# Patient Record
Sex: FEMALE | Race: OTHER | NOT HISPANIC OR LATINO | URBAN - METROPOLITAN AREA
[De-identification: names, ages, dates, MRNs, and addresses within clinical notes are randomized per-mention and may not be internally consistent; named-entity substitution may affect disease eponyms.]

---

## 2017-09-11 ENCOUNTER — OUTPATIENT (OUTPATIENT)
Dept: OUTPATIENT SERVICES | Facility: HOSPITAL | Age: 67
LOS: 1 days | Discharge: HOME | End: 2017-09-11

## 2017-09-11 DIAGNOSIS — E78.5 HYPERLIPIDEMIA, UNSPECIFIED: ICD-10-CM

## 2017-09-11 DIAGNOSIS — I10 ESSENTIAL (PRIMARY) HYPERTENSION: ICD-10-CM

## 2017-09-11 DIAGNOSIS — D64.9 ANEMIA, UNSPECIFIED: ICD-10-CM

## 2018-01-22 ENCOUNTER — OUTPATIENT (OUTPATIENT)
Dept: OUTPATIENT SERVICES | Facility: HOSPITAL | Age: 68
LOS: 1 days | Discharge: HOME | End: 2018-01-22

## 2018-01-22 DIAGNOSIS — E11.9 TYPE 2 DIABETES MELLITUS WITHOUT COMPLICATIONS: ICD-10-CM

## 2018-04-30 ENCOUNTER — OUTPATIENT (OUTPATIENT)
Dept: OUTPATIENT SERVICES | Facility: HOSPITAL | Age: 68
LOS: 1 days | Discharge: HOME | End: 2018-04-30

## 2018-04-30 DIAGNOSIS — E11.9 TYPE 2 DIABETES MELLITUS WITHOUT COMPLICATIONS: ICD-10-CM

## 2018-09-07 ENCOUNTER — INPATIENT (INPATIENT)
Facility: HOSPITAL | Age: 68
LOS: 11 days | Discharge: NURSING, REHAB OR LONG-TERM CARE | End: 2018-09-19
Attending: INTERNAL MEDICINE | Admitting: INTERNAL MEDICINE

## 2018-09-07 VITALS — HEART RATE: 92 BPM | DIASTOLIC BLOOD PRESSURE: 74 MMHG | SYSTOLIC BLOOD PRESSURE: 114 MMHG

## 2018-09-07 LAB
ALBUMIN SERPL ELPH-MCNC: 3.9 G/DL — SIGNIFICANT CHANGE UP (ref 3.5–5.2)
ALBUMIN SERPL ELPH-MCNC: 4.1 G/DL — SIGNIFICANT CHANGE UP (ref 3.5–5.2)
ALP SERPL-CCNC: 22 U/L — LOW (ref 30–115)
ALP SERPL-CCNC: 67 U/L — SIGNIFICANT CHANGE UP (ref 30–115)
ALT FLD-CCNC: 9 U/L — SIGNIFICANT CHANGE UP (ref 0–41)
ALT FLD-CCNC: 9 U/L — SIGNIFICANT CHANGE UP (ref 0–41)
ANION GAP SERPL CALC-SCNC: 17 MMOL/L — HIGH (ref 7–14)
ANION GAP SERPL CALC-SCNC: 24 MMOL/L — HIGH (ref 7–14)
APAP SERPL-MCNC: <5 UG/ML — LOW (ref 10–30)
APAP SERPL-MCNC: <5 UG/ML — LOW (ref 10–30)
APPEARANCE UR: CLEAR — SIGNIFICANT CHANGE UP
APTT BLD: 27.7 SEC — SIGNIFICANT CHANGE UP (ref 27–39.2)
AST SERPL-CCNC: 15 U/L — SIGNIFICANT CHANGE UP (ref 0–41)
AST SERPL-CCNC: 16 U/L — SIGNIFICANT CHANGE UP (ref 0–41)
BASOPHILS # BLD AUTO: 0.02 K/UL — SIGNIFICANT CHANGE UP (ref 0–0.2)
BASOPHILS NFR BLD AUTO: 0.3 % — SIGNIFICANT CHANGE UP (ref 0–1)
BILIRUB SERPL-MCNC: 0.2 MG/DL — SIGNIFICANT CHANGE UP (ref 0.2–1.2)
BILIRUB SERPL-MCNC: 0.3 MG/DL — SIGNIFICANT CHANGE UP (ref 0.2–1.2)
BILIRUB UR-MCNC: NEGATIVE — SIGNIFICANT CHANGE UP
BUN SERPL-MCNC: 52 MG/DL — HIGH (ref 10–20)
BUN SERPL-MCNC: 53 MG/DL — HIGH (ref 10–20)
CALCIUM SERPL-MCNC: 9.4 MG/DL — SIGNIFICANT CHANGE UP (ref 8.5–10.1)
CALCIUM SERPL-MCNC: SIGNIFICANT CHANGE UP MG/DL (ref 8.5–10.1)
CHLORIDE SERPL-SCNC: 101 MMOL/L — SIGNIFICANT CHANGE UP (ref 98–110)
CHLORIDE SERPL-SCNC: 96 MMOL/L — LOW (ref 98–110)
CO2 SERPL-SCNC: 20 MMOL/L — SIGNIFICANT CHANGE UP (ref 17–32)
CO2 SERPL-SCNC: 21 MMOL/L — SIGNIFICANT CHANGE UP (ref 17–32)
COLOR SPEC: YELLOW — SIGNIFICANT CHANGE UP
CREAT SERPL-MCNC: 4.6 MG/DL — CRITICAL HIGH (ref 0.7–1.5)
CREAT SERPL-MCNC: SIGNIFICANT CHANGE UP MG/DL (ref 0.7–1.5)
DIFF PNL FLD: NEGATIVE — SIGNIFICANT CHANGE UP
EOSINOPHIL # BLD AUTO: 0.12 K/UL — SIGNIFICANT CHANGE UP (ref 0–0.7)
EOSINOPHIL NFR BLD AUTO: 1.9 % — SIGNIFICANT CHANGE UP (ref 0–8)
ETHANOL SERPL-MCNC: <10 MG/DL — HIGH
ETHANOL SERPL-MCNC: SIGNIFICANT CHANGE UP MG/DL
GLUCOSE SERPL-MCNC: 107 MG/DL — HIGH (ref 70–99)
GLUCOSE SERPL-MCNC: 125 MG/DL — HIGH (ref 70–99)
GLUCOSE UR QL: NEGATIVE MG/DL — SIGNIFICANT CHANGE UP
HCT VFR BLD CALC: 29.6 % — LOW (ref 37–47)
HGB BLD-MCNC: 9.6 G/DL — LOW (ref 12–16)
IMM GRANULOCYTES NFR BLD AUTO: 0.3 % — SIGNIFICANT CHANGE UP (ref 0.1–0.3)
INR BLD: 1.03 RATIO — SIGNIFICANT CHANGE UP (ref 0.65–1.3)
KETONES UR-MCNC: NEGATIVE — SIGNIFICANT CHANGE UP
LEUKOCYTE ESTERASE UR-ACNC: NEGATIVE — SIGNIFICANT CHANGE UP
LYMPHOCYTES # BLD AUTO: 1.73 K/UL — SIGNIFICANT CHANGE UP (ref 1.2–3.4)
LYMPHOCYTES # BLD AUTO: 27.2 % — SIGNIFICANT CHANGE UP (ref 20.5–51.1)
MCHC RBC-ENTMCNC: 31.7 PG — HIGH (ref 27–31)
MCHC RBC-ENTMCNC: 32.4 G/DL — SIGNIFICANT CHANGE UP (ref 32–37)
MCV RBC AUTO: 97.7 FL — SIGNIFICANT CHANGE UP (ref 81–99)
MONOCYTES # BLD AUTO: 0.5 K/UL — SIGNIFICANT CHANGE UP (ref 0.1–0.6)
MONOCYTES NFR BLD AUTO: 7.8 % — SIGNIFICANT CHANGE UP (ref 1.7–9.3)
NEUTROPHILS # BLD AUTO: 3.98 K/UL — SIGNIFICANT CHANGE UP (ref 1.4–6.5)
NEUTROPHILS NFR BLD AUTO: 62.5 % — SIGNIFICANT CHANGE UP (ref 42.2–75.2)
NITRITE UR-MCNC: NEGATIVE — SIGNIFICANT CHANGE UP
NRBC # BLD: 0 /100 WBCS — SIGNIFICANT CHANGE UP (ref 0–0)
PH UR: 6 — SIGNIFICANT CHANGE UP (ref 5–8)
PLATELET # BLD AUTO: 204 K/UL — SIGNIFICANT CHANGE UP (ref 130–400)
POTASSIUM SERPL-MCNC: 5.4 MMOL/L — HIGH (ref 3.5–5)
POTASSIUM SERPL-MCNC: SIGNIFICANT CHANGE UP MMOL/L (ref 3.5–5)
POTASSIUM SERPL-SCNC: 5.4 MMOL/L — HIGH (ref 3.5–5)
POTASSIUM SERPL-SCNC: SIGNIFICANT CHANGE UP MMOL/L (ref 3.5–5)
PROT SERPL-MCNC: 8.3 G/DL — HIGH (ref 6–8)
PROT SERPL-MCNC: 8.3 G/DL — HIGH (ref 6–8)
PROT UR-MCNC: 100 MG/DL
PROTHROM AB SERPL-ACNC: 11.1 SEC — SIGNIFICANT CHANGE UP (ref 9.95–12.87)
RBC # BLD: 3.03 M/UL — LOW (ref 4.2–5.4)
RBC # FLD: 11.9 % — SIGNIFICANT CHANGE UP (ref 11.5–14.5)
SALICYLATES SERPL-MCNC: <0.3 MG/DL — LOW (ref 4–30)
SALICYLATES SERPL-MCNC: <0.3 MG/DL — LOW (ref 4–30)
SODIUM SERPL-SCNC: 139 MMOL/L — SIGNIFICANT CHANGE UP (ref 135–146)
SODIUM SERPL-SCNC: 140 MMOL/L — SIGNIFICANT CHANGE UP (ref 135–146)
SP GR SPEC: 1.02 — SIGNIFICANT CHANGE UP (ref 1.01–1.03)
TROPONIN T SERPL-MCNC: 0.03 NG/ML — CRITICAL HIGH
UROBILINOGEN FLD QL: 0.2 MG/DL — SIGNIFICANT CHANGE UP (ref 0.2–0.2)
WBC # BLD: 6.37 K/UL — SIGNIFICANT CHANGE UP (ref 4.8–10.8)
WBC # FLD AUTO: 6.37 K/UL — SIGNIFICANT CHANGE UP (ref 4.8–10.8)

## 2018-09-07 RX ORDER — ACETAMINOPHEN 500 MG
975 TABLET ORAL ONCE
Qty: 0 | Refills: 0 | Status: DISCONTINUED | OUTPATIENT
Start: 2018-09-07 | End: 2018-09-07

## 2018-09-07 RX ORDER — ATORVASTATIN CALCIUM 80 MG/1
40 TABLET, FILM COATED ORAL AT BEDTIME
Qty: 0 | Refills: 0 | Status: DISCONTINUED | OUTPATIENT
Start: 2018-09-07 | End: 2018-09-19

## 2018-09-07 RX ORDER — HYDROMORPHONE HYDROCHLORIDE 2 MG/ML
2 INJECTION INTRAMUSCULAR; INTRAVENOUS; SUBCUTANEOUS ONCE
Qty: 0 | Refills: 0 | Status: DISCONTINUED | OUTPATIENT
Start: 2018-09-07 | End: 2018-09-07

## 2018-09-07 RX ORDER — OLANZAPINE 15 MG/1
10 TABLET, FILM COATED ORAL DAILY
Qty: 0 | Refills: 0 | Status: DISCONTINUED | OUTPATIENT
Start: 2018-09-07 | End: 2018-09-19

## 2018-09-07 RX ORDER — HEPARIN SODIUM 5000 [USP'U]/ML
5000 INJECTION INTRAVENOUS; SUBCUTANEOUS EVERY 12 HOURS
Qty: 0 | Refills: 0 | Status: DISCONTINUED | OUTPATIENT
Start: 2018-09-07 | End: 2018-09-19

## 2018-09-07 RX ORDER — SODIUM CHLORIDE 9 MG/ML
1000 INJECTION INTRAMUSCULAR; INTRAVENOUS; SUBCUTANEOUS
Qty: 0 | Refills: 0 | Status: DISCONTINUED | OUTPATIENT
Start: 2018-09-07 | End: 2018-09-08

## 2018-09-07 RX ORDER — ASPIRIN/CALCIUM CARB/MAGNESIUM 324 MG
81 TABLET ORAL DAILY
Qty: 0 | Refills: 0 | Status: DISCONTINUED | OUTPATIENT
Start: 2018-09-07 | End: 2018-09-19

## 2018-09-07 RX ORDER — CITALOPRAM 10 MG/1
20 TABLET, FILM COATED ORAL DAILY
Qty: 0 | Refills: 0 | Status: DISCONTINUED | OUTPATIENT
Start: 2018-09-07 | End: 2018-09-19

## 2018-09-07 RX ORDER — SODIUM CHLORIDE 9 MG/ML
1000 INJECTION, SOLUTION INTRAVENOUS ONCE
Qty: 0 | Refills: 0 | Status: COMPLETED | OUTPATIENT
Start: 2018-09-07 | End: 2018-09-07

## 2018-09-07 RX ORDER — HYDROMORPHONE HYDROCHLORIDE 2 MG/ML
1 INJECTION INTRAMUSCULAR; INTRAVENOUS; SUBCUTANEOUS ONCE
Qty: 0 | Refills: 0 | Status: DISCONTINUED | OUTPATIENT
Start: 2018-09-07 | End: 2018-09-07

## 2018-09-07 RX ORDER — SODIUM CHLORIDE 9 MG/ML
2000 INJECTION, SOLUTION INTRAVENOUS ONCE
Qty: 0 | Refills: 0 | Status: DISCONTINUED | OUTPATIENT
Start: 2018-09-07 | End: 2018-09-07

## 2018-09-07 RX ADMIN — SODIUM CHLORIDE 1000 MILLILITER(S): 9 INJECTION, SOLUTION INTRAVENOUS at 16:45

## 2018-09-07 RX ADMIN — SODIUM CHLORIDE 75 MILLILITER(S): 9 INJECTION INTRAMUSCULAR; INTRAVENOUS; SUBCUTANEOUS at 22:40

## 2018-09-07 RX ADMIN — HEPARIN SODIUM 5000 UNIT(S): 5000 INJECTION INTRAVENOUS; SUBCUTANEOUS at 22:40

## 2018-09-07 RX ADMIN — SODIUM CHLORIDE 75 MILLILITER(S): 9 INJECTION INTRAMUSCULAR; INTRAVENOUS; SUBCUTANEOUS at 20:20

## 2018-09-07 RX ADMIN — ATORVASTATIN CALCIUM 40 MILLIGRAM(S): 80 TABLET, FILM COATED ORAL at 22:37

## 2018-09-07 NOTE — H&P ADULT - PMH
Alzheimer disease    Depression, unspecified depression type    Diabetes    High cholesterol    HTN (hypertension)

## 2018-09-07 NOTE — H&P ADULT - ATTENDING COMMENTS
#encephalopathy  unclear baseline  ?due to dementia  continue to monitor, need collateral information from family    #luz on ckd iii  check urine studies  us without hydro  no urgent indication for hd as long as r/o uremic encephalopathy

## 2018-09-07 NOTE — ED PROVIDER NOTE - PSYCHIATRIC, MLM
Alert, eye tracking, interactive but without apparent comprehension. Verbal but only able to say "Yes". apparently confused. no apparent risk to self or others.

## 2018-09-07 NOTE — ED PROVIDER NOTE - PROGRESS NOTE DETAILS
Decision made by Neurology stroke attending to perform CTA head and neck. Low concern for stroke by Neurology attending at this time. Will continue eval for other etiologies of AMS. unlikely to be stroke per neuro. stroke code canceled. will look for other causes of ams Attending Note: I personally evaluated the patient. I reviewed the Resident’s note (as assigned above), and agree with the findings and plan except as documented in my note.   67 y/o F nursing home resident PMHx dementia and HTN presents as stroke pre notification for abnormal speech, last seen normal 10AM and noted to be this way 11:15AM when EMS was called. No fevers or chills. No n/v. Pt without recent abnormal behavior.  No areas of numbness or tingling. No focal weakness. PE: Drowsy appearing. normal HEENT. Heart RRR. Lungs CTAB. Abdomen soft NTND. Extremities 2+ b/l pulses intact. Neuro slow responses though appropriate, poorly cooperative with neurologic exam. NH stroke scale 1 by followings commands. Strength 5/5 all extremities, sensation grossly intact, no pronator drift. Plan: Stroke code continued. Labs, imaging, and evaluate for other metabolic or infectious causes.

## 2018-09-07 NOTE — ED PROVIDER NOTE - NEUROLOGICAL, MLM
Alert, unable to respond to commands or speak in complete sentences. Unable to assess NIHSS. [id number].

## 2018-09-07 NOTE — ED PROVIDER NOTE - CONSTITUTIONAL, MLM
normal... Elderly woman, awake, eye opening spontaneously, moving all extremities and in no apparent distress.

## 2018-09-07 NOTE — ED ADULT NURSE REASSESSMENT NOTE - NS ED NURSE REASSESS COMMENT FT1
pt at bedside caren connected to cardiac monitor. patient went for ct head with and without contrast. patient vitals stable. on 2L o2.pt baseline alert with confusion as per nursing home. patient now at bedside disoriented to situation. pt eye open spot. unable to follow all commands at this time. neuro following. will continue to assess and monitor

## 2018-09-07 NOTE — H&P ADULT - NSHPLABSRESULTS_GEN_ALL_CORE
09-07    139  |  101  |  52<H>  ----------------------------<  107<H>  5.4<H>   |  21  |  4.6<HH>    Ca    9.4      07 Sep 2018 14:00    TPro  8.3<H>  /  Alb  3.9  /  TBili  0.2  /  DBili  x   /  AST  16  /  ALT  9   /  AlkPhos  67  09-07     last creatinine in 2017 2.23                     9.6    6.37  )-----------( 204      ( 07 Sep 2018 12:14 )             29.6     CT Angio Neck w/ IV Cont (09.07.18 @ 12:46) >      Impression:    CTA neck: No significant vascular stenosis within the neck.    CTA head: No large vessel occlusion identified.    Focal stenosis involving a distal segment of the right posterior cerebral   artery with patency of the distal vessel.    Other: Prominence of the nasopharyngeal soft tissues. Nonemergent direct   visualization/ENT evaluation is recommended.     CT Brain Stroke Protocol (09.07.18 @ 12:17) >    IMPRESSION:     No evidence of acute intracranial hemorrhage or mass effect.    Chronic microvascular ischemic changes.

## 2018-09-07 NOTE — STROKE CODE NOTE - DISPOSITION
As per medicine, no acute stroke intervention. No LVO on CTA please call back general neurology if neurology workup warranted./Other

## 2018-09-07 NOTE — ED ADULT NURSE NOTE - OBJECTIVE STATEMENT
Pt hx of Alzheimer's BIBA [facility] for increased altered mental status at 10AM and worst at 1115. Pt presents to ER with left side facial droop, unable to answer questions or follow commands. Pt hx of Alzheimer's BIB[facility] for increased altered mental status at 10AM and worst at 1115. Pt presents to ER with left side facial droop, unable to answer questions or follow commands. Code stroke activated, neuro NP at bedside. Pt on cardiac monitor/continous pulse ox. Pending CT Scan Results.  Will continue to monitor.

## 2018-09-07 NOTE — H&P ADULT - ASSESSMENT
68 years old female pt with past medical hx of type 2 DM, CKD stage 4 baseline crt 2.23 ,hypertension, depression, delusional disorder, hyperlipidemia transferred to [facility] ER because of fall this morning, when her vitals were checked bp was low 80/50.    # WILIAM on ckd stage 4      prerenal/ need to rule out ATN ( was hypotension)/ rule out obstruction     do bladder scan     creatinine in last 2017 2.23     start iv hydration 0.9 % NS at 75 cc/hour     insert foleys cath for strict I/O     renal bladder sonogram     urinalysis/ urinary electrolytes     will check po4/ PTH    # metabolic encephalopathy     ct scan old microvascular changes, no acute pathology    # anemia normocytic     will check iron studies, fecal occult blood         # Hypertension, currently bp on lower side     will hold off all anti HTN medications    # type 2 DM last hba1c was 6      target < 180    # depression and delusion disorder      started on home meds      zyprexa abd celexa    # DVT prophylaxis will start heparin subcut TID  #  diet renal diet, with low K  # activity fall precaution  # full code

## 2018-09-07 NOTE — ED PROVIDER NOTE - OBJECTIVE STATEMENT
68yF with PMH HTN, DM, HLD p/w 10am LSN, 11:15am decreased mental status with inability to speak in full sentences and apparent confusion. [facility] and had been behaving normally the day prior, with no complaints. patient on arrival was only saying "yes" in answer to questions, apparently unable to say anything else. unable to follow commands. stroke code called on arrival.

## 2018-09-07 NOTE — H&P ADULT - NSHPPHYSICALEXAM_GEN_ALL_CORE
not in any acute distress  confused not oriented to time and place  pterygium in both eyes  mild paler  chest bilateral basal crackles  cvs s1 and s2 no added sound  abdomen soft lax no organomegaly  no pedal edema gen: not in any acute distress  neuro: confused not oriented to time and place  eyes: pterygium in both eyes  mild paler  pulm: chest bilateral basal crackles  cvs: s1 and s2 no added sound  abdomen: soft lax no organomegaly  ext: no pedal edema  psych: not oriented  skin: no ulcers, rashes  back: limited rom

## 2018-09-07 NOTE — STROKE CODE NOTE - ABSOLUTE EXCLUSION OTHER CRITERIA
Unsure of time of onset, none stroke like presentation. No acute stroke intervention. Unsure of time of onset and her baseline NIHSS. No stroke like presentation. No acute stroke intervention warranted at this time.

## 2018-09-07 NOTE — H&P ADULT - HISTORY OF PRESENT ILLNESS
patient is confused was unable to get information from her, history taken from nurse taking care of her in nursing home.  68 years old female pt with past medical hx of type 2 DM, CKD stage 4 baseline crt 2.23 ,hypertension, depression, delusional disorder, hyperlipidemia transferred to [facility] ER because of fall this morning, when her vitals were checked bp was low 80/50. She has been weak and lethargic for few weeks, has poor appetite.  she denies any vomiting, diarrhoea or abdominal pain, no change in her urinary habit, no fever.  she is always confused baseline, so no change in her mental change was noticed.  no weight change recently.  she denies any cough, chest pain, no SOB, has noticed increased tremors .  In ER stroke code was called, but as per neurology no intervention, no acute pathology on ct scan head, received total of 2 L of LR patient is confused was unable to get information from her, history taken from nurse taking care of her in nursing home.  68 years old female pt with past medical hx of type 2 DM, CKD stage 4 baseline crt 2.23 ,hypertension, alzheimer disease depression, delusional disorder, hyperlipidemia transferred to [facility] ER because of fall this morning, when her vitals were checked bp was low 80/50. She has been weak and lethargic for few weeks, has poor appetite.  she denies any vomiting, diarrhoea or abdominal pain, no change in her urinary habit, no fever.  she is always confused baseline, so no change in her mental change was noticed.  no weight change recently.  she denies any cough, chest pain, no SOB, has noticed increased tremors .  In ER stroke code was called, but as per neurology no intervention, no acute pathology on ct scan head, received total of 2 L of LR

## 2018-09-07 NOTE — ED ADULT NURSE NOTE - NSIMPLEMENTINTERV_GEN_ALL_ED
Implemented All Fall with Harm Risk Interventions:  [place] to call system. Call bell, personal items and telephone within reach. Instruct patient to call for assistance. Room bathroom lighting operational. Non-slip footwear when patient is off stretcher. Physically safe environment: no spills, clutter or unnecessary equipment. Stretcher in lowest position, wheels locked, appropriate side rails in place. Provide visual cue, wrist band, yellow gown, etc. Monitor gait and stability. Monitor for mental status changes and reorient to person, place, and time. Review medications for side effects contributing to fall risk. Reinforce activity limits and safety measures with patient and family. Provide visual clues: red socks.

## 2018-09-08 LAB
ANION GAP SERPL CALC-SCNC: 17 MMOL/L — HIGH (ref 7–14)
ANION GAP SERPL CALC-SCNC: 18 MMOL/L — HIGH (ref 7–14)
BUN SERPL-MCNC: 52 MG/DL — HIGH (ref 10–20)
BUN SERPL-MCNC: 53 MG/DL — HIGH (ref 10–20)
CALCIUM SERPL-MCNC: 9.7 MG/DL — SIGNIFICANT CHANGE UP (ref 8.5–10.1)
CALCIUM SERPL-MCNC: 9.9 MG/DL — SIGNIFICANT CHANGE UP (ref 8.5–10.1)
CHLORIDE SERPL-SCNC: 104 MMOL/L — SIGNIFICANT CHANGE UP (ref 98–110)
CHLORIDE SERPL-SCNC: 105 MMOL/L — SIGNIFICANT CHANGE UP (ref 98–110)
CHOLEST SERPL-MCNC: 188 MG/DL — SIGNIFICANT CHANGE UP (ref 100–200)
CO2 SERPL-SCNC: 22 MMOL/L — SIGNIFICANT CHANGE UP (ref 17–32)
CO2 SERPL-SCNC: 24 MMOL/L — SIGNIFICANT CHANGE UP (ref 17–32)
CREAT ?TM UR-MCNC: 78 MG/DL — SIGNIFICANT CHANGE UP
CREAT SERPL-MCNC: 4.6 MG/DL — CRITICAL HIGH (ref 0.7–1.5)
CREAT SERPL-MCNC: 4.8 MG/DL — CRITICAL HIGH (ref 0.7–1.5)
CULTURE RESULTS: NO GROWTH — SIGNIFICANT CHANGE UP
GLUCOSE BLDC GLUCOMTR-MCNC: 103 MG/DL — HIGH (ref 70–99)
GLUCOSE BLDC GLUCOMTR-MCNC: 128 MG/DL — HIGH (ref 70–99)
GLUCOSE BLDC GLUCOMTR-MCNC: 95 MG/DL — SIGNIFICANT CHANGE UP (ref 70–99)
GLUCOSE BLDC GLUCOMTR-MCNC: 95 MG/DL — SIGNIFICANT CHANGE UP (ref 70–99)
GLUCOSE SERPL-MCNC: 86 MG/DL — SIGNIFICANT CHANGE UP (ref 70–99)
GLUCOSE SERPL-MCNC: 88 MG/DL — SIGNIFICANT CHANGE UP (ref 70–99)
HCT VFR BLD CALC: 27.3 % — LOW (ref 37–47)
HDLC SERPL-MCNC: 80 MG/DL — SIGNIFICANT CHANGE UP
HGB BLD-MCNC: 8.7 G/DL — LOW (ref 12–16)
IRON SATN MFR SERPL: 24 % — SIGNIFICANT CHANGE UP (ref 15–50)
IRON SATN MFR SERPL: 47 UG/DL — SIGNIFICANT CHANGE UP (ref 35–150)
LIPID PNL WITH DIRECT LDL SERPL: 86 MG/DL — SIGNIFICANT CHANGE UP (ref 4–129)
MAGNESIUM SERPL-MCNC: 2.3 MG/DL — SIGNIFICANT CHANGE UP (ref 1.8–2.4)
MCHC RBC-ENTMCNC: 31.4 PG — HIGH (ref 27–31)
MCHC RBC-ENTMCNC: 31.9 G/DL — LOW (ref 32–37)
MCV RBC AUTO: 98.6 FL — SIGNIFICANT CHANGE UP (ref 81–99)
NRBC # BLD: 0 /100 WBCS — SIGNIFICANT CHANGE UP (ref 0–0)
OSMOLALITY UR: 378 MOS/KG — SIGNIFICANT CHANGE UP (ref 50–1400)
PHOSPHATE SERPL-MCNC: 4.8 MG/DL — SIGNIFICANT CHANGE UP (ref 2.1–4.9)
PLATELET # BLD AUTO: 188 K/UL — SIGNIFICANT CHANGE UP (ref 130–400)
POTASSIUM SERPL-MCNC: 4.8 MMOL/L — SIGNIFICANT CHANGE UP (ref 3.5–5)
POTASSIUM SERPL-MCNC: 4.9 MMOL/L — SIGNIFICANT CHANGE UP (ref 3.5–5)
POTASSIUM SERPL-SCNC: 4.8 MMOL/L — SIGNIFICANT CHANGE UP (ref 3.5–5)
POTASSIUM SERPL-SCNC: 4.9 MMOL/L — SIGNIFICANT CHANGE UP (ref 3.5–5)
POTASSIUM UR-SCNC: 3 MMOL/L — SIGNIFICANT CHANGE UP
RBC # BLD: 2.77 M/UL — LOW (ref 4.2–5.4)
RBC # FLD: 11.9 % — SIGNIFICANT CHANGE UP (ref 11.5–14.5)
SODIUM SERPL-SCNC: 143 MMOL/L — SIGNIFICANT CHANGE UP (ref 135–146)
SODIUM SERPL-SCNC: 147 MMOL/L — HIGH (ref 135–146)
SODIUM UR-SCNC: 20 MMOL/L — SIGNIFICANT CHANGE UP
SODIUM UR-SCNC: 69 MMOL/L — SIGNIFICANT CHANGE UP
SPECIMEN SOURCE: SIGNIFICANT CHANGE UP
TIBC SERPL-MCNC: 200 UG/DL — LOW (ref 220–430)
TOTAL CHOLESTEROL/HDL RATIO MEASUREMENT: 2.4 RATIO — LOW (ref 4–5.5)
TRIGL SERPL-MCNC: 111 MG/DL — SIGNIFICANT CHANGE UP (ref 10–149)
UIBC SERPL-MCNC: 153 UG/DL — SIGNIFICANT CHANGE UP (ref 110–370)
WBC # BLD: 5.43 K/UL — SIGNIFICANT CHANGE UP (ref 4.8–10.8)
WBC # FLD AUTO: 5.43 K/UL — SIGNIFICANT CHANGE UP (ref 4.8–10.8)

## 2018-09-08 RX ORDER — SODIUM CHLORIDE 9 MG/ML
1000 INJECTION, SOLUTION INTRAVENOUS
Qty: 0 | Refills: 0 | Status: DISCONTINUED | OUTPATIENT
Start: 2018-09-08 | End: 2018-09-11

## 2018-09-08 RX ORDER — HYDRALAZINE HCL 50 MG
5 TABLET ORAL ONCE
Qty: 0 | Refills: 0 | Status: COMPLETED | OUTPATIENT
Start: 2018-09-08 | End: 2018-09-08

## 2018-09-08 RX ORDER — HYDRALAZINE HCL 50 MG
10 TABLET ORAL ONCE
Qty: 0 | Refills: 0 | Status: COMPLETED | OUTPATIENT
Start: 2018-09-08 | End: 2018-09-08

## 2018-09-08 RX ORDER — NIFEDIPINE 30 MG
60 TABLET, EXTENDED RELEASE 24 HR ORAL DAILY
Qty: 0 | Refills: 0 | Status: DISCONTINUED | OUTPATIENT
Start: 2018-09-08 | End: 2018-09-13

## 2018-09-08 RX ADMIN — OLANZAPINE 10 MILLIGRAM(S): 15 TABLET, FILM COATED ORAL at 13:00

## 2018-09-08 RX ADMIN — HEPARIN SODIUM 5000 UNIT(S): 5000 INJECTION INTRAVENOUS; SUBCUTANEOUS at 05:16

## 2018-09-08 RX ADMIN — SODIUM CHLORIDE 100 MILLILITER(S): 9 INJECTION, SOLUTION INTRAVENOUS at 16:00

## 2018-09-08 RX ADMIN — ATORVASTATIN CALCIUM 40 MILLIGRAM(S): 80 TABLET, FILM COATED ORAL at 21:35

## 2018-09-08 RX ADMIN — SODIUM CHLORIDE 75 MILLILITER(S): 9 INJECTION INTRAMUSCULAR; INTRAVENOUS; SUBCUTANEOUS at 07:35

## 2018-09-08 RX ADMIN — CITALOPRAM 20 MILLIGRAM(S): 10 TABLET, FILM COATED ORAL at 13:00

## 2018-09-08 RX ADMIN — Medication 81 MILLIGRAM(S): at 13:00

## 2018-09-08 RX ADMIN — HEPARIN SODIUM 5000 UNIT(S): 5000 INJECTION INTRAVENOUS; SUBCUTANEOUS at 18:40

## 2018-09-08 RX ADMIN — Medication 60 MILLIGRAM(S): at 20:05

## 2018-09-08 RX ADMIN — Medication 5 MILLIGRAM(S): at 23:32

## 2018-09-08 RX ADMIN — Medication 10 MILLIGRAM(S): at 21:35

## 2018-09-08 NOTE — ED ADULT NURSE REASSESSMENT NOTE - NS ED NURSE REASSESS COMMENT FT1
Pt disoriented, English and Creole speaking, in no acute distress, breathing WNL, abdomen wnl, 16 fr patterson catheter intact and draining, pt denies chest pain, remains on cardiac monitor, skin intact, , generalized weakness, no bowel movement as of yet, pt ate breakfast, will continue to monitor the pt.

## 2018-09-08 NOTE — CHART NOTE - NSCHARTNOTEFT_GEN_A_CORE
Per nurse, BP systolic 200s on machine and manual is 156/100. HTN meds were held due to BP of 80/50 in ED. Home med hydralazine 25 bid and nifedipine extended release 60 qd per EMR. Pt confused. Restarted nifedipine. Told nurse to recheck BP in 1 hr. Add hydralazine if BP can tolerate.

## 2018-09-09 LAB
CALCIUM SERPL-MCNC: 9.9 MG/DL — SIGNIFICANT CHANGE UP (ref 8.4–10.5)
FERRITIN SERPL-MCNC: 195 NG/ML — HIGH (ref 15–150)
GLUCOSE BLDC GLUCOMTR-MCNC: 104 MG/DL — HIGH (ref 70–99)
GLUCOSE BLDC GLUCOMTR-MCNC: 89 MG/DL — SIGNIFICANT CHANGE UP (ref 70–99)
GLUCOSE BLDC GLUCOMTR-MCNC: 90 MG/DL — SIGNIFICANT CHANGE UP (ref 70–99)
PTH-INTACT FLD-MCNC: 281 PG/ML — HIGH (ref 15–65)
TSH SERPL-MCNC: 1.64 UIU/ML — SIGNIFICANT CHANGE UP (ref 0.27–4.2)

## 2018-09-09 RX ORDER — LANOLIN ALCOHOL/MO/W.PET/CERES
5 CREAM (GRAM) TOPICAL ONCE
Qty: 0 | Refills: 0 | Status: COMPLETED | OUTPATIENT
Start: 2018-09-09 | End: 2018-09-09

## 2018-09-09 RX ADMIN — Medication 81 MILLIGRAM(S): at 11:27

## 2018-09-09 RX ADMIN — Medication 5 MILLIGRAM(S): at 03:31

## 2018-09-09 RX ADMIN — CITALOPRAM 20 MILLIGRAM(S): 10 TABLET, FILM COATED ORAL at 11:27

## 2018-09-09 RX ADMIN — HEPARIN SODIUM 5000 UNIT(S): 5000 INJECTION INTRAVENOUS; SUBCUTANEOUS at 05:44

## 2018-09-09 RX ADMIN — HEPARIN SODIUM 5000 UNIT(S): 5000 INJECTION INTRAVENOUS; SUBCUTANEOUS at 18:15

## 2018-09-09 RX ADMIN — OLANZAPINE 10 MILLIGRAM(S): 15 TABLET, FILM COATED ORAL at 11:27

## 2018-09-10 LAB
ANION GAP SERPL CALC-SCNC: 14 MMOL/L — SIGNIFICANT CHANGE UP (ref 7–14)
BASOPHILS # BLD AUTO: 0.01 K/UL — SIGNIFICANT CHANGE UP (ref 0–0.2)
BASOPHILS NFR BLD AUTO: 0.2 % — SIGNIFICANT CHANGE UP (ref 0–1)
BUN SERPL-MCNC: 45 MG/DL — HIGH (ref 10–20)
CALCIUM SERPL-MCNC: 9.1 MG/DL — SIGNIFICANT CHANGE UP (ref 8.5–10.1)
CHLORIDE SERPL-SCNC: 104 MMOL/L — SIGNIFICANT CHANGE UP (ref 98–110)
CO2 SERPL-SCNC: 23 MMOL/L — SIGNIFICANT CHANGE UP (ref 17–32)
CREAT SERPL-MCNC: 4.5 MG/DL — CRITICAL HIGH (ref 0.7–1.5)
EOSINOPHIL # BLD AUTO: 0.12 K/UL — SIGNIFICANT CHANGE UP (ref 0–0.7)
EOSINOPHIL NFR BLD AUTO: 2.6 % — SIGNIFICANT CHANGE UP (ref 0–8)
GLUCOSE BLDC GLUCOMTR-MCNC: 114 MG/DL — HIGH (ref 70–99)
GLUCOSE BLDC GLUCOMTR-MCNC: 87 MG/DL — SIGNIFICANT CHANGE UP (ref 70–99)
GLUCOSE BLDC GLUCOMTR-MCNC: 89 MG/DL — SIGNIFICANT CHANGE UP (ref 70–99)
GLUCOSE BLDC GLUCOMTR-MCNC: 92 MG/DL — SIGNIFICANT CHANGE UP (ref 70–99)
GLUCOSE SERPL-MCNC: 150 MG/DL — HIGH (ref 70–99)
HCT VFR BLD CALC: 27.1 % — LOW (ref 37–47)
HGB BLD-MCNC: 8.7 G/DL — LOW (ref 12–16)
IMM GRANULOCYTES NFR BLD AUTO: 0.2 % — SIGNIFICANT CHANGE UP (ref 0.1–0.3)
LYMPHOCYTES # BLD AUTO: 0.98 K/UL — LOW (ref 1.2–3.4)
LYMPHOCYTES # BLD AUTO: 21.4 % — SIGNIFICANT CHANGE UP (ref 20.5–51.1)
MCHC RBC-ENTMCNC: 31.8 PG — HIGH (ref 27–31)
MCHC RBC-ENTMCNC: 32.1 G/DL — SIGNIFICANT CHANGE UP (ref 32–37)
MCV RBC AUTO: 98.9 FL — SIGNIFICANT CHANGE UP (ref 81–99)
MONOCYTES # BLD AUTO: 0.31 K/UL — SIGNIFICANT CHANGE UP (ref 0.1–0.6)
MONOCYTES NFR BLD AUTO: 6.8 % — SIGNIFICANT CHANGE UP (ref 1.7–9.3)
NEUTROPHILS # BLD AUTO: 3.14 K/UL — SIGNIFICANT CHANGE UP (ref 1.4–6.5)
NEUTROPHILS NFR BLD AUTO: 68.8 % — SIGNIFICANT CHANGE UP (ref 42.2–75.2)
NRBC # BLD: 0 /100 WBCS — SIGNIFICANT CHANGE UP (ref 0–0)
PLATELET # BLD AUTO: 172 K/UL — SIGNIFICANT CHANGE UP (ref 130–400)
POTASSIUM SERPL-MCNC: 4.4 MMOL/L — SIGNIFICANT CHANGE UP (ref 3.5–5)
POTASSIUM SERPL-SCNC: 4.4 MMOL/L — SIGNIFICANT CHANGE UP (ref 3.5–5)
RBC # BLD: 2.74 M/UL — LOW (ref 4.2–5.4)
RBC # FLD: 11.8 % — SIGNIFICANT CHANGE UP (ref 11.5–14.5)
SODIUM SERPL-SCNC: 141 MMOL/L — SIGNIFICANT CHANGE UP (ref 135–146)
UUN UR-MCNC: 439 MG/DL — SIGNIFICANT CHANGE UP
WBC # BLD: 4.57 K/UL — LOW (ref 4.8–10.8)
WBC # FLD AUTO: 4.57 K/UL — LOW (ref 4.8–10.8)

## 2018-09-10 RX ADMIN — SODIUM CHLORIDE 100 MILLILITER(S): 9 INJECTION, SOLUTION INTRAVENOUS at 07:46

## 2018-09-10 RX ADMIN — CITALOPRAM 20 MILLIGRAM(S): 10 TABLET, FILM COATED ORAL at 12:12

## 2018-09-10 RX ADMIN — HEPARIN SODIUM 5000 UNIT(S): 5000 INJECTION INTRAVENOUS; SUBCUTANEOUS at 17:45

## 2018-09-10 RX ADMIN — OLANZAPINE 10 MILLIGRAM(S): 15 TABLET, FILM COATED ORAL at 12:12

## 2018-09-10 RX ADMIN — Medication 81 MILLIGRAM(S): at 12:12

## 2018-09-10 RX ADMIN — HEPARIN SODIUM 5000 UNIT(S): 5000 INJECTION INTRAVENOUS; SUBCUTANEOUS at 06:34

## 2018-09-10 NOTE — PHYSICAL THERAPY INITIAL EVALUATION ADULT - GAIT DEVIATIONS NOTED, PT EVAL
decreased stride length/decreased weight-shifting ability/decreased joey/decreased step length/Poor ability to weight shift dependent on PT cues and facilitation, poor balance with decreased balance reactions, decreased step length/height, minimal heel strike./increased time in double stance

## 2018-09-10 NOTE — PROGRESS NOTE ADULT - ASSESSMENT
68 years old female pt with past medical hx of type 2 DM, CKD stage 4 baseline crt 2.23 ,hypertension, alzheimer disease depression, delusional disorder, hyperlipidemia transferred to [facility] ER because of fall this morning, when her vitals were checked bp was low 80/50. She has been weak and lethargic for few weeks, has poor appetite.    In ER stroke code was called, but as per neurology no intervention, no acute pathology on ct scan head, received total of 2 L of LR (07 Sep 2018 16:11)      # WILIAM on CKD stage 4   - Serum creatinine since admission has been 4.6 - 4.6 - 4.8  - WILIAM Most likely 2/2 low intake and dehydration.  - creatinine in last 2017 2.23  -  received iv hydration 0.9 % NS at 75 cc/hour  -  Urine sodium 20, k 3, osmolality 378  (9/8)  -  Po4 was 4.7 on 9/7  - Nephro consult pending    - bladder scan 9/7 showed   1.  Bilateral renal disease. No hydronephrosis.  2.  Nondiagnostic examination of the urinary bladder.       # Encephalopathy? - toxic/metabolic  - CTH  old microvascular changes, no acute pathology  - Urinalysis did not show any acute Urinary tract infection    # anemia normocytic  - Iron studies done 9/8; ferritin 195, iron 47, iron binding capacity 153, total iron binding capacity 200, percentage saturation 24%.  - TSH (9/8) 1.64      # Hypertension, currently bp on lower side     were held off initially because of low BP. Now on Nifedipine XL 60    # type 2 DM   - Not on long term insulin  - check blood glucose AC+HS  - Start insulin if blood glucose is consistently above 180  - last hba1c was 6      # depression and delusion disorder      started on home meds      zyprexa abd celexa    # DVT prophylaxis; Heparin  #  diet renal diet, with low K  # activity fall precaution  # full code 68 years old female pt with past medical hx of type 2 DM, CKD stage 4 baseline crt 2.23 ,hypertension, alzheimer disease depression, delusional disorder, hyperlipidemia transferred to [facility] ER because of fall this morning, when her vitals were checked bp was low 80/50. She has been weak and lethargic for few weeks, has poor appetite.    In ER stroke code was called, but as per neurology no intervention, no acute pathology on ct scan head, received total of 2 L of LR (07 Sep 2018 16:11)      # WILIAM on CKD stage 4   - Serum creatinine since admission has been 4.6 - 4.6 - 4.8  - WILIAM Most likely 2/2 low intake and dehydration.  - creatinine in last 2017 2.23  -  received iv hydration 0.9 % NS at 75 cc/hour  -  Urine sodium 20, k 3, osmolality 378  (9/8)  -  Po4 was 4.7 on 9/7  - Nephro consult pending  -repeat BMP today     - bladder scan 9/7 showed   1.  Bilateral renal disease. No hydronephrosis.  2.  Nondiagnostic examination of the urinary bladder.       # Encephalopathy? - toxic/metabolic  - CTH  old microvascular changes, no acute pathology  - Urinalysis did not show any acute Urinary tract infection    # anemia normocytic  - Iron studies done 9/8; ferritin 195, iron 47, iron binding capacity 153, total iron binding capacity 200, percentage saturation 24%.  - TSH (9/8) 1.64      # Hypertension, currently bp on lower side     were held off initially because of low BP. Now on Nifedipine XL 60    # type 2 DM   - Not on long term insulin  - check blood glucose AC+HS  - Start insulin if blood glucose is consistently above 180  - last hba1c was 6      # depression and delusion disorder      started on home meds      zyprexa abd celexa    # DVT prophylaxis; Heparin  #  diet renal diet, with low K  # activity fall precaution  # full code 68 years old female pt with past medical hx of type 2 DM, CKD stage 4 baseline crt 2.23 ,hypertension, alzheimer disease depression, delusional disorder, hyperlipidemia transferred to [facility] ER because of fall this morning, when her vitals were checked bp was low 80/50. She has been weak and lethargic for few weeks, has poor appetite.    In ER stroke code was called, but as per neurology no intervention, no acute pathology on ct scan head, received total of 2 L of LR (07 Sep 2018 16:11)      # WILIAM on CKD stage 4   - Serum creatinine since admission has been 4.6 - 4.6 - 4.8  - WILIAM Most likely 2/2 low intake and dehydration.  - creatinine in last 2017 2.23  -  received iv hydration 0.9 % NS at 75 cc/hour  -  Urine sodium 20, k 3, osmolality 378  (9/8)  -  Po4 was 4.7 on 9/7  - Nephro consult pending  -repeat BMP today     - bladder scan 9/7 showed   1.  Bilateral renal disease. No hydronephrosis.  2.  Nondiagnostic examination of the urinary bladder.       # Encephalopathy - uremic  - CTH  old microvascular changes, no acute pathology  - Urinalysis did not show any acute Urinary tract infection  -Serum creatinine since admission has been 4.6 > 4.6 > 4.8 > 4.5 > 4.4    # anemia normocytic  - Iron studies done 9/8; ferritin 195, iron 47, iron binding capacity 153, total iron binding capacity 200, percentage saturation 24%.  - TSH (9/8) 1.64      # Hypertension, currently bp on lower side     were held off initially because of low BP. Now on Nifedipine XL 60    # type 2 DM   - Not on long term insulin  - check blood glucose AC+HS  - Start insulin if blood glucose is consistently above 180  - last hba1c was 6      # depression and delusion disorder      started on home meds      zyprexa abd celexa    # DVT prophylaxis; Heparin  #  diet renal diet, with low K  # activity fall precaution  # full code

## 2018-09-10 NOTE — CONSULT NOTE ADULT - ASSESSMENT
IMPRESSION: Rehab of gait dysfunction      PRECAUTIONS: [  ] Cardiac  [  ] Respiratory  [  ] Seizures [  ] Contact Isolation  [  ] Droplet Isolation  [  ] Other    Weight Bearing Status:     RECOMMENDATION:    Out of Bed to Chair     DVT/Decubiti Prophylaxis    REHAB PLAN:     [x   ] Bedside P/T 3-5 times a week   [   ]   Bedside O/T  2-3 times a week             [   ] No Rehab Therapy Indicated                   [   ]  Speech Therapy   Conditioning/ROM                                    ADL  Bed Mobility                                               Conditioning/ROM  Transfers                                                     Bed Mobility  Sitting /Standing Balance                         Transfers                                        Gait Training                                               Sitting/Standing Balance  Stair Training [   ]Applicable                    Home equipment Eval                                                                        Splinting  [   ] Only      GOALS:   ADL   [   ]   Independent                    Transfers  [ x  ] Independent                          Ambulation  [ x  ] Independent     [ x   ] With device                            [   ]  CG                                                         [   ]  CG                                                                  [   ] CG                            [    ] Min A                                                   [   ] Min A                                                              [   ] Min  A          DISCHARGE PLAN:   [   ]  Good candidate for Intensive Rehabilitation/Hospital based-4A [facility]                                             Will tolerate 3hrs Intensive Rehab Daily                                       [ x   ]  Short Term Rehab in Skilled Nursing Facility / assisted living fascility                                       [    ]  Home with Outpatient or  services                                         [    ]  Possible Candidate for Intensive Hospital based Rehab

## 2018-09-10 NOTE — PHYSICAL THERAPY INITIAL EVALUATION ADULT - PLANNED THERAPY INTERVENTIONS, PT EVAL
balance training/gait training/transfer training/postural re-education/bed mobility training/strengthening

## 2018-09-10 NOTE — CONSULT NOTE ADULT - SUBJECTIVE AND OBJECTIVE BOX
HPI:  patient is confused was unable to get information from her, history taken from nurse taking care of her in nursing home.  68 years old female pt with past medical hx of type 2 DM, CKD stage 4 baseline crt 2.23 ,hypertension, alzheimer disease depression, delusional disorder, hyperlipidemia transferred to [facility] ER because of fall this morning, when her vitals were checked bp was low 80/50. She has been weak and lethargic for few weeks, has poor appetite.  she denies any vomiting, diarrhoea or abdominal pain, no change in her urinary habit, no fever.  she is always confused baseline, so no change in her mental change was noticed.  no weight change recently.  she denies any cough, chest pain, no SOB, has noticed increased tremors .  In ER stroke code was called, but as per neurology no intervention, no acute pathology on ct scan head, received total of 2 L of LR (07 Sep 2018 16:11)      PAST MEDICAL & SURGICAL HISTORY:  Depression, unspecified depression type  Alzheimer disease  Diabetes  HTN (hypertension)  High cholesterol  No significant past surgical history      Hospital Course:    TODAY'S SUBJECTIVE & REVIEW OF SYMPTOMS:     Constitutional WNL   Cardio WNL   Resp WNL   GI WNL  Heme WNL  Endo WNL  Skin WNL  MSK Weakness      MEDICATIONS  (STANDING):  aspirin  chewable 81 milliGRAM(s) Oral daily  atorvastatin 40 milliGRAM(s) Oral at bedtime  citalopram 20 milliGRAM(s) Oral daily  heparin  Injectable 5000 Unit(s) SubCutaneous every 12 hours  NIFEdipine XL 60 milliGRAM(s) Oral daily  OLANZapine 10 milliGRAM(s) Oral daily  sodium chloride 0.45%. 1000 milliLiter(s) (100 mL/Hr) IV Continuous <Continuous>    MEDICATIONS  (PRN):      FAMILY HISTORY:  No pertinent family history in first degree relatives      Allergies    No Known Allergies    Intolerances        SOCIAL HISTORY:    [  ] Etoh  [  ] Smoking  [  ] Substance abuse     Home Environment:  [  ] Home Alone  [  ] Lives with Family  [  ] Home Health Aid    Dwelling:  [  ] Apartment  [  ] Private House  [  ] Adult Home  [ x ] Skilled Nursing Facility      [  ] Short Term  [  ] Long Term  [  ] Stairs       Elevator [  ]    FUNCTIONAL STATUS PTA: (Check all that apply)  Ambulation: [   ]Independent    [x  ] Dependent     [  ] Non-Ambulatory  Assistive Device: [  ] SA Cane  [  ]  Q Cane  [  ] Walker  [  ]  Wheelchair  ADL : [  ] Independent  [x  ]  Dependent       Vital Signs Last 24 Hrs  T(C): 36.6 (10 Sep 2018 05:04), Max: 36.6 (10 Sep 2018 05:04)  T(F): 97.9 (10 Sep 2018 05:04), Max: 97.9 (10 Sep 2018 05:04)  HR: 92 (10 Sep 2018 05:04) (80 - 92)  BP: 154/67 (10 Sep 2018 05:04) (154/67 - 175/90)  BP(mean): --  RR: 18 (10 Sep 2018 05:04) (18 - 18)  SpO2: --      PHYSICAL EXAM: Awake / confused  GENERAL: NAD, well-groomed, well-developed  HEAD:  Atraumatic, Normocephalic  CHEST/LUNG: Clear  HEART: S1S2+  ABDOMEN: Soft, Nontender  EXTREMITIES:  no calf tenderness    NERVOUS SYSTEM:  Cranial Nerves 2-12 intact [  ] Abnormal  [  ]  ROM: WFL all extremities [  ]  Abnormal [  ]able to move all ext  Motor Strength: WFL all extremities  [  ]  Abnormal [  ]  Sensation: intact to light touch [  ] Abnormal [  ]  Reflexes: Symmetric [  ]  Abnormal [  ]    FUNCTIONAL STATUS:  Bed Mobility: Independent [  ]  Supervision [  ]  Needs Assistance [ x ]  N/A [  ]  Transfers: Independent [  ]  Supervision [  ]  Needs Assistance [x  ]  N/A [  ]   Ambulation: Independent [  ]  Supervision [  ]  Needs Assistance [  ]  N/A [  ]  ADL: Independent [  ] Requires Assistance [  ] N/A [  ]      LABS:                RADIOLOGY & ADDITIONAL STUDIES:    Assesment:

## 2018-09-10 NOTE — PROGRESS NOTE ADULT - SUBJECTIVE AND OBJECTIVE BOX
SUBJECTIVE:  Patient is a 68y old Female who presents with a chief complaint of fall and low BP 80/50 (07 Sep 2018 16:11)  Currently admitted to medicine with the primary diagnosis of encephalopathy (toxic/metabolic)       INTERVAL HISTORY;  Today is hospital day 4d. This morning she is resting comfortably in chair and reports no acute event overnight.      PAST MEDICAL & SURGICAL HISTORY  Depression, unspecified depression type  Alzheimer disease  Diabetes  HTN (hypertension)  High cholesterol  No significant past surgical history    SOCIAL HISTORY:  Negative for smoking/alcohol/drug use.     ALLERGIES:  No Known Allergies    MEDICATIONS:  STANDING MEDICATIONS  aspirin  chewable 81 milliGRAM(s) Oral daily  atorvastatin 40 milliGRAM(s) Oral at bedtime  citalopram 20 milliGRAM(s) Oral daily  heparin  Injectable 5000 Unit(s) SubCutaneous every 12 hours  NIFEdipine XL 60 milliGRAM(s) Oral daily  OLANZapine 10 milliGRAM(s) Oral daily  sodium chloride 0.45%. 1000 milliLiter(s) IV Continuous <Continuous>    PRN MEDICATIONS    VITALS:   T(F): 97.9  HR: 92  BP: 154/67  RR: 18  SpO2: --    LABS:    Culture - Urine (collected 07 Sep 2018 12:37)  Source: .Urine Clean Catch (Midstream)  Final Report (08 Sep 2018 21:47):    No growth      RADIOLOGY:  < from: US Kidney and Bladder (09.07.18 @ 22:31) >  Impression:  1.  Bilateral renal disease. No hydronephrosis.  2.  Nondiagnostic examination of the urinary bladder.      < from: Xray Chest 1 View AP/PA (09.07.18 @ 14:03) >    IMPRESSION:   No radiographic evidence of acute cardiopulmonary disease.      < from: CT Angio Neck w/ IV Cont (09.07.18 @ 12:46) >  Impression:    CTA neck: No significant vascular stenosis within the neck.    CTA head: No large vessel occlusion identified.    Focal stenosis involving a distal segment of the right posterior cerebral   artery with patency of the distal vessel.      < from: CT Brain Stroke Protocol (09.07.18 @ 12:17) >  IMPRESSION:   No evidence of acute intracranial hemorrhage or mass effect.  Chronic microvascular ischemic changes.      PHYSICAL EXAM:  GEN: No acute distress  LUNGS: Clear to auscultation bilaterally   HEART: S1/S2 present. RRR.   ABD: Soft, non-tender, non-distended. Bowel sounds present  EXT: NC/NC/NE/2+PP/NEWELL/Skin Intact.   NEURO: AAOX3    Intravenous access:   NG tube:   Fonseca cathter: Indwelling Urethral Catheter:     Connect To:  Straight Drainage/Gravity    Indication:  Urine Output Monitoring in Critically Ill (09-07-18 @ 16:47) (not performed) [active] SUBJECTIVE:  Patient is a 68y old Female who presents with a chief complaint of fall and low BP 80/50 (07 Sep 2018 16:11)  Currently admitted to medicine with the primary diagnosis of encephalopathy (toxic/metabolic)       INTERVAL HISTORY;  Today is hospital day 4d. This morning she is resting comfortably in chair and reports no acute event overnight.      PAST MEDICAL & SURGICAL HISTORY  Depression, unspecified depression type  Alzheimer disease  Diabetes  HTN (hypertension)  High cholesterol  No significant past surgical history    SOCIAL HISTORY:  Negative for smoking/alcohol/drug use.     ALLERGIES:  No Known Allergies    MEDICATIONS:  STANDING MEDICATIONS  aspirin  chewable 81 milliGRAM(s) Oral daily  atorvastatin 40 milliGRAM(s) Oral at bedtime  citalopram 20 milliGRAM(s) Oral daily  heparin  Injectable 5000 Unit(s) SubCutaneous every 12 hours  NIFEdipine XL 60 milliGRAM(s) Oral daily  OLANZapine 10 milliGRAM(s) Oral daily  sodium chloride 0.45%. 1000 milliLiter(s) IV Continuous <Continuous>    PRN MEDICATIONS    VITALS:   T(F): 97.9  HR: 92  BP: 154/67  RR: 18  SpO2: --    LABS:    Culture - Urine (collected 07 Sep 2018 12:37)  Source: .Urine Clean Catch (Midstream)  Final Report (08 Sep 2018 21:47):    No growth      RADIOLOGY:  < from: US Kidney and Bladder (09.07.18 @ 22:31) >  Impression:  1.  Bilateral renal disease. No hydronephrosis.  2.  Nondiagnostic examination of the urinary bladder.      < from: Xray Chest 1 View AP/PA (09.07.18 @ 14:03) >    IMPRESSION:   No radiographic evidence of acute cardiopulmonary disease.      < from: CT Angio Neck w/ IV Cont (09.07.18 @ 12:46) >  Impression:    CTA neck: No significant vascular stenosis within the neck.    CTA head: No large vessel occlusion identified.    Focal stenosis involving a distal segment of the right posterior cerebral   artery with patency of the distal vessel.      < from: CT Brain Stroke Protocol (09.07.18 @ 12:17) >  IMPRESSION:   No evidence of acute intracranial hemorrhage or mass effect.  Chronic microvascular ischemic changes.      PHYSICAL EXAM:  GEN: No acute distress  Pulm: Clear to auscultation bilaterally   CV: S1/S2 present. RRR.   GI: Soft, non-tender, non-distended. Bowel sounds present  EXT: NC/NC/NE/2+PP/NEWELL/Skin Intact.   NEURO: AAOX3    Intravenous access:   NG tube:   Fonseca cathter: Indwelling Urethral Catheter:     Connect To:  Straight Drainage/Gravity    Indication:  Urine Output Monitoring in Critically Ill (09-07-18 @ 16:47) (not performed) [active] patient is confused was unable to get information from her, history taken from nurse taking care of her in nursing home.  68 years old female pt with past medical hx of type 2 DM, CKD stage 4 baseline crt 2.23 ,hypertension, alzheimer disease depression, delusional disorder, hyperlipidemia transferred to [facility] ER because of fall this morning, when her vitals were checked bp was low 80/50. She has been weak and lethargic for few weeks, has poor appetite.  she denies any vomiting, diarrhoea or abdominal pain, no change in her urinary habit, no fever.  she is always confused baseline, so no change in her mental change was noticed.  no weight change recently.  she denies any cough, chest pain, no SOB, has noticed increased tremors .  In ER stroke code was called, but as per neurology no intervention, no acute pathology on ct scan head, received total of 2 L of LR (07 Sep 2018 16:11)    SUBJECTIVE:  Patient is a 68y old Female who presents with a chief complaint of fall and low BP 80/50 (07 Sep 2018 16:11)  Currently admitted to medicine with the primary diagnosis of encephalopathy (toxic/metabolic)       INTERVAL HISTORY;  Today is hospital day 4d. This morning she is resting comfortably in chair and reports no acute event overnight.      PAST MEDICAL & SURGICAL HISTORY  Depression, unspecified depression type  Alzheimer disease  Diabetes  HTN (hypertension)  High cholesterol  No significant past surgical history    SOCIAL HISTORY:  Negative for smoking/alcohol/drug use.     ALLERGIES:  No Known Allergies    MEDICATIONS:  STANDING MEDICATIONS  aspirin  chewable 81 milliGRAM(s) Oral daily  atorvastatin 40 milliGRAM(s) Oral at bedtime  citalopram 20 milliGRAM(s) Oral daily  heparin  Injectable 5000 Unit(s) SubCutaneous every 12 hours  NIFEdipine XL 60 milliGRAM(s) Oral daily  OLANZapine 10 milliGRAM(s) Oral daily  sodium chloride 0.45%. 1000 milliLiter(s) IV Continuous <Continuous>    PRN MEDICATIONS    VITALS:   T(F): 97.9  HR: 92  BP: 154/67  RR: 18  SpO2: --    LABS:    Culture - Urine (collected 07 Sep 2018 12:37)  Source: .Urine Clean Catch (Midstream)  Final Report (08 Sep 2018 21:47):    No growth      RADIOLOGY:  < from: US Kidney and Bladder (09.07.18 @ 22:31) >  Impression:  1.  Bilateral renal disease. No hydronephrosis.  2.  Nondiagnostic examination of the urinary bladder.      < from: Xray Chest 1 View AP/PA (09.07.18 @ 14:03) >    IMPRESSION:   No radiographic evidence of acute cardiopulmonary disease.      < from: CT Angio Neck w/ IV Cont (09.07.18 @ 12:46) >  Impression:    CTA neck: No significant vascular stenosis within the neck.    CTA head: No large vessel occlusion identified.    Focal stenosis involving a distal segment of the right posterior cerebral   artery with patency of the distal vessel.      < from: CT Brain Stroke Protocol (09.07.18 @ 12:17) >  IMPRESSION:   No evidence of acute intracranial hemorrhage or mass effect.  Chronic microvascular ischemic changes.      PHYSICAL EXAM:  GEN: No acute distress  Pulm: Clear to auscultation bilaterally   CV: S1/S2 present. RRR.   GI: Soft, non-tender, non-distended. Bowel sounds present  EXT: NC/NC/NE/2+PP/NEWELL/Skin Intact.   NEURO: AAOX3    Intravenous access:   NG tube:   Fonseca cathter: Indwelling Urethral Catheter:     Connect To:  Straight Drainage/Gravity    Indication:  Urine Output Monitoring in Critically Ill (09-07-18 @ 16:47) (not performed) [active]

## 2018-09-10 NOTE — PHYSICAL THERAPY INITIAL EVALUATION ADULT - IMPAIRMENTS FOUND, PT EVAL
ergonomics and body mechanics/arousal, attention, and cognition/gait, locomotion, and balance/muscle strength/poor safety awareness/posture

## 2018-09-10 NOTE — PHYSICAL THERAPY INITIAL EVALUATION ADULT - GENERAL OBSERVATIONS, REHAB EVAL
[id number] [phone] Pt rec semifowler in bed with +patterson, +bed alarm, +IVF, in NAD. Pt appears very confused however follows along with tactile cues with PT assist.

## 2018-09-11 LAB
ANION GAP SERPL CALC-SCNC: 12 MMOL/L — SIGNIFICANT CHANGE UP (ref 7–14)
BASOPHILS # BLD AUTO: 0.01 K/UL — SIGNIFICANT CHANGE UP (ref 0–0.2)
BASOPHILS NFR BLD AUTO: 0.2 % — SIGNIFICANT CHANGE UP (ref 0–1)
BUN SERPL-MCNC: 43 MG/DL — HIGH (ref 10–20)
CALCIUM SERPL-MCNC: 9 MG/DL — SIGNIFICANT CHANGE UP (ref 8.5–10.1)
CHLORIDE SERPL-SCNC: 102 MMOL/L — SIGNIFICANT CHANGE UP (ref 98–110)
CO2 SERPL-SCNC: 23 MMOL/L — SIGNIFICANT CHANGE UP (ref 17–32)
CREAT SERPL-MCNC: 4.4 MG/DL — CRITICAL HIGH (ref 0.7–1.5)
EOSINOPHIL # BLD AUTO: 0.2 K/UL — SIGNIFICANT CHANGE UP (ref 0–0.7)
EOSINOPHIL NFR BLD AUTO: 4.1 % — SIGNIFICANT CHANGE UP (ref 0–8)
GLUCOSE BLDC GLUCOMTR-MCNC: 165 MG/DL — HIGH (ref 70–99)
GLUCOSE BLDC GLUCOMTR-MCNC: 90 MG/DL — SIGNIFICANT CHANGE UP (ref 70–99)
GLUCOSE SERPL-MCNC: 92 MG/DL — SIGNIFICANT CHANGE UP (ref 70–99)
HCT VFR BLD CALC: 26.5 % — LOW (ref 37–47)
HGB BLD-MCNC: 8.7 G/DL — LOW (ref 12–16)
IMM GRANULOCYTES NFR BLD AUTO: 0.2 % — SIGNIFICANT CHANGE UP (ref 0.1–0.3)
LYMPHOCYTES # BLD AUTO: 1.44 K/UL — SIGNIFICANT CHANGE UP (ref 1.2–3.4)
LYMPHOCYTES # BLD AUTO: 29.4 % — SIGNIFICANT CHANGE UP (ref 20.5–51.1)
MCHC RBC-ENTMCNC: 31.9 PG — HIGH (ref 27–31)
MCHC RBC-ENTMCNC: 32.8 G/DL — SIGNIFICANT CHANGE UP (ref 32–37)
MCV RBC AUTO: 97.1 FL — SIGNIFICANT CHANGE UP (ref 81–99)
MONOCYTES # BLD AUTO: 0.54 K/UL — SIGNIFICANT CHANGE UP (ref 0.1–0.6)
MONOCYTES NFR BLD AUTO: 11 % — HIGH (ref 1.7–9.3)
NEUTROPHILS # BLD AUTO: 2.69 K/UL — SIGNIFICANT CHANGE UP (ref 1.4–6.5)
NEUTROPHILS NFR BLD AUTO: 55.1 % — SIGNIFICANT CHANGE UP (ref 42.2–75.2)
NRBC # BLD: 0 /100 WBCS — SIGNIFICANT CHANGE UP (ref 0–0)
PLATELET # BLD AUTO: 167 K/UL — SIGNIFICANT CHANGE UP (ref 130–400)
POTASSIUM SERPL-MCNC: 4.5 MMOL/L — SIGNIFICANT CHANGE UP (ref 3.5–5)
POTASSIUM SERPL-SCNC: 4.5 MMOL/L — SIGNIFICANT CHANGE UP (ref 3.5–5)
RBC # BLD: 2.73 M/UL — LOW (ref 4.2–5.4)
RBC # FLD: 11.6 % — SIGNIFICANT CHANGE UP (ref 11.5–14.5)
SODIUM SERPL-SCNC: 137 MMOL/L — SIGNIFICANT CHANGE UP (ref 135–146)
WBC # BLD: 4.89 K/UL — SIGNIFICANT CHANGE UP (ref 4.8–10.8)
WBC # FLD AUTO: 4.89 K/UL — SIGNIFICANT CHANGE UP (ref 4.8–10.8)

## 2018-09-11 RX ORDER — HYDRALAZINE HCL 50 MG
25 TABLET ORAL EVERY 8 HOURS
Qty: 0 | Refills: 0 | Status: DISCONTINUED | OUTPATIENT
Start: 2018-09-11 | End: 2018-09-12

## 2018-09-11 RX ADMIN — Medication 25 MILLIGRAM(S): at 22:13

## 2018-09-11 RX ADMIN — HEPARIN SODIUM 5000 UNIT(S): 5000 INJECTION INTRAVENOUS; SUBCUTANEOUS at 17:40

## 2018-09-11 RX ADMIN — ATORVASTATIN CALCIUM 40 MILLIGRAM(S): 80 TABLET, FILM COATED ORAL at 22:13

## 2018-09-11 RX ADMIN — OLANZAPINE 10 MILLIGRAM(S): 15 TABLET, FILM COATED ORAL at 11:28

## 2018-09-11 RX ADMIN — Medication 81 MILLIGRAM(S): at 11:28

## 2018-09-11 RX ADMIN — Medication 60 MILLIGRAM(S): at 05:55

## 2018-09-11 RX ADMIN — HEPARIN SODIUM 5000 UNIT(S): 5000 INJECTION INTRAVENOUS; SUBCUTANEOUS at 05:55

## 2018-09-11 RX ADMIN — CITALOPRAM 20 MILLIGRAM(S): 10 TABLET, FILM COATED ORAL at 11:28

## 2018-09-11 NOTE — CONSULT NOTE ADULT - ATTENDING COMMENTS
I was Physically Present for the key portions of the evaluation   I agree with the above History  , Physical examination Assessment and plan   I have Reviewed , Modified or appended where appropriate.  Please check A and P as above   1- WILIAM on CKD 3-4 vs progression of CKD (last creat was mid 2's in 2017)  DC IVF, encourage po intake  agree with renal artery dupplex rule out VELMA  no need for RRT   check PTH   needs good [facility] on DC  DC patterson catheter  2- HTN agree with with nifedipine and Hydralazine (may increase to 50mg po q8h if needed  will follow

## 2018-09-11 NOTE — PROGRESS NOTE ADULT - SUBJECTIVE AND OBJECTIVE BOX
patient was seen and examined today at 1pm     patient appeared comfortable and was able to follow commands does not seem in distress was eating lunch when I saw her looked pleasant and comfortable . she denied any chest pain and sob     Vital Signs Last 24 Hrs  T(C): 36.4 (11 Sep 2018 13:00), Max: 36.4 (11 Sep 2018 13:00)  T(F): 97.5 (11 Sep 2018 13:00), Max: 97.5 (11 Sep 2018 13:00)  HR: 84 (11 Sep 2018 13:00) (84 - 84)  BP: 134/86 (11 Sep 2018 13:00) (134/86 - 188/100)  BP(mean): --  RR: 84 (11 Sep 2018 13:00) (84 - 84)  SpO2: 97% (10 Sep 2018 22:38) (97% - 97%)    PHYSICAL EXAM:  GENERAL: NAD, well-developed  HEAD:  Atraumatic, Normocephalic  EYES: EOMI, PERRLA, conjunctiva and sclera clear  NECK: Supple, No JVD  Pulm Clear to auscultation bilaterally; No wheeze  CV: Regular rate and rhythm; No murmurs, rubs, or gallops  GI: Soft, Nontender, Nondistended; Bowel sounds present  EXTREMITIES:  2+ Peripheral Pulses, No clubbing, cyanosis, or edema  PSYCH: AAOx2  NEUROLOGY: non-focal  SKIN: No rashes or lesions                          8.7    4.89  )-----------( 167      ( 11 Sep 2018 06:46 )             26.5     09-11    137  |  102  |  43<H>  ----------------------------<  92  4.5   |  23  |  4.4<HH>    Ca    9.0      11 Sep 2018 06:46      Home Medications:  CeleXA 20 mg oral tablet: 1 tab(s) orally once a day (07 Sep 2018 16:21)  hydrALAZINE 25 mg oral tablet: 1 tab(s) orally 2 times a day (07 Sep 2018 16:23)  NIFEdipine 60 mg oral tablet, extended release: 1 tab(s) orally once a day (07 Sep 2018 16:22)  rosuvastatin 10 mg oral tablet: 1 tab(s) orally once a day (at bedtime) (07 Sep 2018 16:23)  ZyPREXA 10 mg oral tablet: 1 tab(s) orally once a day (07 Sep 2018 16:22)      MEDICATIONS  (STANDING):  aspirin  chewable 81 milliGRAM(s) Oral daily  atorvastatin 40 milliGRAM(s) Oral at bedtime  citalopram 20 milliGRAM(s) Oral daily  heparin  Injectable 5000 Unit(s) SubCutaneous every 12 hours  hydrALAZINE 25 milliGRAM(s) Oral every 8 hours  NIFEdipine XL 60 milliGRAM(s) Oral daily  OLANZapine 10 milliGRAM(s) Oral daily    MEDICATIONS  (PRN):

## 2018-09-11 NOTE — CONSULT NOTE ADULT - SUBJECTIVE AND OBJECTIVE BOX
NEPHROLOGY CONSULTATION NOTE  history taken from chart  68 years old female pt with past medical hx of type 2 DM, CKD stage 4 baseline crt 2.23 ,hypertension, alzheimer disease depression, delusional disorder, hyperlipidemia transferred to [facility] ER because of fall and low bp 80/50. She has been weak and lethargic for few weeks, has poor appetite.  she denies any vomiting, diarrhoea or abdominal pain, no change in her urinary habit, no fever.  she is always confused baseline, so no change in her mental change was noticed.  no weight change recently.  she denies any cough, chest pain, no SOB, has noticed increased tremors .    In ER stroke code was called, but as per neurology no intervention, no acute pathology on ct scan head, received total of 2 L of LR.  she was found to have WILIAM on ckd stage 4     PAST MEDICAL & SURGICAL HISTORY:  Depression, unspecified depression type  Alzheimer disease  Diabetes  HTN (hypertension)  High cholesterol  No significant past surgical history    Allergies:  No Known Allergies    Home Medications Reviewed  Hospital Medications:   MEDICATIONS  (STANDING):  aspirin  chewable 81 milliGRAM(s) Oral daily  atorvastatin 40 milliGRAM(s) Oral at bedtime  citalopram 20 milliGRAM(s) Oral daily  heparin  Injectable 5000 Unit(s) SubCutaneous every 12 hours  hydrALAZINE 25 milliGRAM(s) Oral every 8 hours  NIFEdipine XL 60 milliGRAM(s) Oral daily  OLANZapine 10 milliGRAM(s) Oral daily      SOCIAL HISTORY:  Denies ETOH,Smoking,   FAMILY HISTORY:  No pertinent family history in first degree relatives        REVIEW OF SYSTEMS:  as per [facility]    VITALS:  T(F): 98.8 (09-10-18 @ 13:30), Max: 98.8 (09-10-18 @ 13:30)  HR: 80 (09-10-18 @ 13:30)  BP: 188/100 (18 @ 05:40)  RR: 18 (09-10-18 @ 13:30)  SpO2: 97% (09-10-18 @ 22:38)    09-10 @ 07:01  -   @ 07:00  --------------------------------------------------------  IN: 1450 mL / OUT: 1805 mL / NET: -355 mL          09-10-18 @ 07:01  -  18 @ 07:00  --------------------------------------------------------  IN: 0 mL / OUT: 1405 mL / NET: -1405 mL      I&O's Detail    10 Sep 2018 07:01  -  11 Sep 2018 07:00  --------------------------------------------------------  IN:    Oral Fluid: 450 mL    sodium chloride 0.45%: 1000 mL  Total IN: 1450 mL    OUT:    Indwelling Catheter - Urethral: 1405 mL    Voided: 400 mL  Total OUT: 1805 mL    Total NET: -355 mL            PHYSICAL EXAM:  alert oriented not in distress  asterixes on outstretched hand  chest clear bilaterally  cvs s1 and s2 no added sound  abdomen soft lax no organomegaly  mild non pitting edema  s/p Fonseca's catheter    LABS:      137  |  102  |  43<H>  ----------------------------<  92  4.5   |  23  |  4.4<HH>    Ca    9.0      11 Sep 2018 06:46      Creatinine Trend: 4.4 <--, 4.5 <--, 4.8 <--, 4.6 <--, 4.6 <--, TNP <--                        8.7    4.89  )-----------( 167      ( 11 Sep 2018 06:46 )             26.5     Iron with Total Binding Capacity (18 @ 08:49)    % Saturation, Iron: 24 %    Iron - Total Binding Capacity.: 200 ug/dL    Iron Total, Serum: 47 ug/dL    Unsaturated Iron Binding Capacity: 153 ug/dL    Parathyroid Hormone Intact, Serum (18 @ 08:49)    Intact PTH: 281  Phosphorus Level, Serum (18 @ 08:49)    Phosphorus Level, Serum: 4.8 mg/dL            Urine Studies:  Urinalysis Basic - ( 07 Sep 2018 16:50 )    Color: Yellow / Appearance: Clear / S.020 / pH:   Gluc:  / Ketone: Negative  / Bili: Negative / Urobili: 0.2 mg/dL   Blood:  / Protein: 100 mg/dL / Nitrite: Negative   Leuk Esterase: Negative / RBC:  / WBC    Sq Epi:  / Non Sq Epi: Occasional /HPF / Bacteria:       Sodium, Random Urine: 20.0 mmoL/L ( @ 13:52)  Osmolality, Random Urine: 378 mos/kg ( @ 13:52)  Potassium, Random Urine: 3 mmol/L ( @ 13:52)  Creatinine, Random Urine: 78 mg/dL ( @ 13:52)  Sodium, Random Urine: 69.0 mmoL/L ( @ 13:52)      RADIOLOGY & ADDITIONAL STUDIES:   US Kidney and Bladder (18 @ 22:31) >  Impression:    1.  Bilateral renal disease. No hydronephrosis.    2.  Nondiagnostic examination of the urinary bladder. NEPHROLOGY CONSULTATION NOTE  history taken from chart  68 years old female pt with past medical hx of type 2 DM, CKD stage 4 baseline crt 2.23 ,hypertension, alzheimer disease depression, delusional disorder, hyperlipidemia transferred to [facility] ER because of fall and low bp 80/50. She has been weak and lethargic for few weeks, has poor appetite.  she denies any vomiting, diarrhoea or abdominal pain, no change in her urinary habit, no fever.  she is always confused baseline, so no change in her mental change was noticed.  no weight change recently.  she denies any cough, chest pain, no SOB, has noticed increased tremors .    In ER stroke code was called, but as per neurology no intervention, no acute pathology on ct scan head, received total of 2 L of LR  nephrology is consulted for worsening kidney function.    PAST MEDICAL & SURGICAL HISTORY:  Depression, unspecified depression type  Alzheimer disease  Diabetes  HTN (hypertension)  High cholesterol  No significant past surgical history    Allergies:  No Known Allergies    Home Medications Reviewed  Hospital Medications:   MEDICATIONS  (STANDING):  aspirin  chewable 81 milliGRAM(s) Oral daily  atorvastatin 40 milliGRAM(s) Oral at bedtime  citalopram 20 milliGRAM(s) Oral daily  heparin  Injectable 5000 Unit(s) SubCutaneous every 12 hours  hydrALAZINE 25 milliGRAM(s) Oral every 8 hours  NIFEdipine XL 60 milliGRAM(s) Oral daily  OLANZapine 10 milliGRAM(s) Oral daily      SOCIAL HISTORY:  Denies ETOH,Smoking,   FAMILY HISTORY:  No pertinent family history in first degree relatives        REVIEW OF SYSTEMS:  as per [facility]    VITALS:  T(F): 98.8 (09-10-18 @ 13:30), Max: 98.8 (09-10-18 @ 13:30)  HR: 80 (09-10-18 @ 13:30)  BP: 188/100 (18 @ 05:40)  RR: 18 (09-10-18 @ 13:30)  SpO2: 97% (09-10-18 @ 22:38)    09-10 @ 07:01  -   @ 07:00  --------------------------------------------------------  IN: 1450 mL / OUT: 1805 mL / NET: -355 mL          09-10-18 @ 07:01  -  18 @ 07:00  --------------------------------------------------------  IN: 0 mL / OUT: 1405 mL / NET: -1405 mL      I&O's Detail    10 Sep 2018 07:01  -  11 Sep 2018 07:00  --------------------------------------------------------  IN:    Oral Fluid: 450 mL    sodium chloride 0.45%: 1000 mL  Total IN: 1450 mL    OUT:    Indwelling Catheter - Urethral: 1405 mL    Voided: 400 mL  Total OUT: 1805 mL    Total NET: -355 mL            PHYSICAL EXAM:  alert oriented not in distress  asterixes on outstretched hand  chest clear bilaterally  cvs s1 and s2 no added sound  abdomen soft lax no organomegaly  mild non pitting edema  s/p Fonseca's catheter    LABS:      137  |  102  |  43<H>  ----------------------------<  92  4.5   |  23  |  4.4<HH>    Ca    9.0      11 Sep 2018 06:46      Creatinine Trend: 4.4 <--, 4.5 <--, 4.8 <--, 4.6 <--, 4.6 <--, TNP <--                        8.7    4.89  )-----------( 167      ( 11 Sep 2018 06:46 )             26.5     Iron with Total Binding Capacity (18 @ 08:49)    % Saturation, Iron: 24 %    Iron - Total Binding Capacity.: 200 ug/dL    Iron Total, Serum: 47 ug/dL    Unsaturated Iron Binding Capacity: 153 ug/dL    Parathyroid Hormone Intact, Serum (18 @ 08:49)    Intact PTH: 281  Phosphorus Level, Serum (18 @ 08:49)    Phosphorus Level, Serum: 4.8 mg/dL            Urine Studies:  Urinalysis Basic - ( 07 Sep 2018 16:50 )    Color: Yellow / Appearance: Clear / S.020 / pH:   Gluc:  / Ketone: Negative  / Bili: Negative / Urobili: 0.2 mg/dL   Blood:  / Protein: 100 mg/dL / Nitrite: Negative   Leuk Esterase: Negative / RBC:  / WBC    Sq Epi:  / Non Sq Epi: Occasional /HPF / Bacteria:       Sodium, Random Urine: 20.0 mmoL/L ( @ 13:52)  Osmolality, Random Urine: 378 mos/kg ( @ 13:52)  Potassium, Random Urine: 3 mmol/L ( @ 13:52)  Creatinine, Random Urine: 78 mg/dL ( @ 13:52)  Sodium, Random Urine: 69.0 mmoL/L ( @ 13:52)      RADIOLOGY & ADDITIONAL STUDIES:   US Kidney and Bladder (18 @ 22:31) >  Impression:    1.  Bilateral renal disease. No hydronephrosis.    2.  Nondiagnostic examination of the urinary bladder.

## 2018-09-11 NOTE — CONSULT NOTE ADULT - ASSESSMENT
68 years old female pt with past medical hx of type 2 DM, CKD stage 4 baseline crt 2.23 ,hypertension, alzheimer disease,  depression, delusional disorder, hyperlipidemia transferred to [facility] ER because of fall and low bp 80/50.     # WILIAM on ckd stage 4     creatinine is stable around 4.4-4.6 ( after iv hydration) likely worsening of CKD     renal sonogram showed atrophic right kidney     do renal doppler to rule out renal artery stenosis     pth/ phosphorus noted     po4 at goal     can start cholecalciferol, check vitamin d 1,25     can stop iv fluids, encourage oral hydration     discontinue foleys catheter    # HTN uncontrolled     do renal doppler     on nifedipine 60 XL     hydralazine added, if remained uncontrolled can increase hydralazine 50 mg TID    # anemia likely chronic     hb stable around 8.7     iron studies noted     can start oral ferrous sulphate BID    # acute confusional state likely metabolic     ct scan head chronic microvascular changes    will follow 68 years old female pt with past medical hx of type 2 DM, CKD stage 4 baseline crt 2.23 ,hypertension, alzheimer disease,  depression, delusional disorder, hyperlipidemia transferred to [facility] ER because of fall and low bp 80/50.     # WILIAM on ckd stage 4     creatinine is stable around 4.4-4.6 ( after iv hydration) likely worsening of CKD     renal sonogram showed atrophic right kidney     do renal doppler to rule out renal artery stenosis     pth/ phosphorus noted     Phosphorus  at goal     can start cholecalciferol, check vitamin d 1,25     can stop iv fluids, encourage oral hydration     discontinue foleys catheter    # HTN uncontrolled     do renal doppler     on nifedipine 60 XL     hydralazine added, if remained uncontrolled can increase hydralazine 50 mg TID    # anemia likely chronic     hb stable around 8.7     iron studies noted     can start oral ferrous sulphate BID    # acute confusional state likely metabolic     ct scan head chronic microvascular changes    will follow

## 2018-09-11 NOTE — PROGRESS NOTE ADULT - ASSESSMENT
WILIAM on CKD 4: scr stable renal on board     HTN: renal duplex pending. not well controlled thus we added hydralazine 25 TID and patient on nifidipine 60 mg qday     Metabolic encephalopathy from unknown cause she did not seem encephalopathic to me   but will need to clarify baseline mental status with family    anemia chronic and stable

## 2018-09-12 LAB
GLUCOSE BLDC GLUCOMTR-MCNC: 102 MG/DL — HIGH (ref 70–99)
GLUCOSE BLDC GLUCOMTR-MCNC: 106 MG/DL — HIGH (ref 70–99)
GLUCOSE BLDC GLUCOMTR-MCNC: 117 MG/DL — HIGH (ref 70–99)
GLUCOSE BLDC GLUCOMTR-MCNC: 184 MG/DL — HIGH (ref 70–99)

## 2018-09-12 RX ORDER — HYDRALAZINE HCL 50 MG
75 TABLET ORAL EVERY 8 HOURS
Qty: 0 | Refills: 0 | Status: DISCONTINUED | OUTPATIENT
Start: 2018-09-12 | End: 2018-09-19

## 2018-09-12 RX ORDER — NIFEDIPINE 30 MG
90 TABLET, EXTENDED RELEASE 24 HR ORAL DAILY
Qty: 0 | Refills: 0 | Status: DISCONTINUED | OUTPATIENT
Start: 2018-09-13 | End: 2018-09-18

## 2018-09-12 RX ORDER — HYDRALAZINE HCL 50 MG
50 TABLET ORAL EVERY 8 HOURS
Qty: 0 | Refills: 0 | Status: DISCONTINUED | OUTPATIENT
Start: 2018-09-12 | End: 2018-09-12

## 2018-09-12 RX ORDER — HYDRALAZINE HCL 50 MG
25 TABLET ORAL ONCE
Qty: 0 | Refills: 0 | Status: COMPLETED | OUTPATIENT
Start: 2018-09-12 | End: 2018-09-12

## 2018-09-12 RX ORDER — HYDRALAZINE HCL 50 MG
10 TABLET ORAL ONCE
Qty: 0 | Refills: 0 | Status: COMPLETED | OUTPATIENT
Start: 2018-09-12 | End: 2018-09-12

## 2018-09-12 RX ORDER — NIFEDIPINE 30 MG
30 TABLET, EXTENDED RELEASE 24 HR ORAL ONCE
Qty: 0 | Refills: 0 | Status: COMPLETED | OUTPATIENT
Start: 2018-09-12 | End: 2018-09-12

## 2018-09-12 RX ADMIN — Medication 30 MILLIGRAM(S): at 09:28

## 2018-09-12 RX ADMIN — HEPARIN SODIUM 5000 UNIT(S): 5000 INJECTION INTRAVENOUS; SUBCUTANEOUS at 17:56

## 2018-09-12 RX ADMIN — ATORVASTATIN CALCIUM 40 MILLIGRAM(S): 80 TABLET, FILM COATED ORAL at 22:16

## 2018-09-12 RX ADMIN — Medication 81 MILLIGRAM(S): at 11:03

## 2018-09-12 RX ADMIN — OLANZAPINE 10 MILLIGRAM(S): 15 TABLET, FILM COATED ORAL at 11:03

## 2018-09-12 RX ADMIN — CITALOPRAM 20 MILLIGRAM(S): 10 TABLET, FILM COATED ORAL at 11:03

## 2018-09-12 RX ADMIN — Medication 50 MILLIGRAM(S): at 06:25

## 2018-09-12 RX ADMIN — HEPARIN SODIUM 5000 UNIT(S): 5000 INJECTION INTRAVENOUS; SUBCUTANEOUS at 06:25

## 2018-09-12 RX ADMIN — Medication 50 MILLIGRAM(S): at 14:01

## 2018-09-12 RX ADMIN — Medication 10 MILLIGRAM(S): at 00:25

## 2018-09-12 RX ADMIN — Medication 25 MILLIGRAM(S): at 02:46

## 2018-09-12 RX ADMIN — Medication 60 MILLIGRAM(S): at 06:24

## 2018-09-12 RX ADMIN — Medication 75 MILLIGRAM(S): at 22:16

## 2018-09-12 NOTE — PROGRESS NOTE ADULT - SUBJECTIVE AND OBJECTIVE BOX
patient looks comfortable. not in distress     Vital Signs Last 24 Hrs  T(C): 36.2 (12 Sep 2018 05:00), Max: 37.6 (11 Sep 2018 21:07)  T(F): 97.1 (12 Sep 2018 05:00), Max: 99.6 (11 Sep 2018 21:07)  HR: 93 (12 Sep 2018 05:00) (81 - 95)  BP: 198/91 (12 Sep 2018 05:00) (134/86 - 201/103)  BP(mean): --  RR: 19 (12 Sep 2018 05:00) (19 - 84)  SpO2: --    PHYSICAL EXAM:  GENERAL: NAD, well-developed  HEAD:  Atraumatic, Normocephalic  EYES: EOMI, PERRLA, conjunctiva and sclera clear  NECK: Supple, No JVD  Pulm: Clear to auscultation bilaterally; No wheeze  CV: Regular rate and rhythm; No murmurs, rubs, or gallops  GI: Soft, Nontender, Nondistended; Bowel sounds present  EXTREMITIES:  2+ Peripheral Pulses, No clubbing, cyanosis, or edema  NEUROLOGY: non-focal  SKIN: No rashes or lesions                          8.7    4.89  )-----------( 167      ( 11 Sep 2018 06:46 )             26.5     09-11    137  |  102  |  43<H>  ----------------------------<  92  4.5   |  23  |  4.4<HH>    Ca    9.0      11 Sep 2018 06:46

## 2018-09-12 NOTE — PROGRESS NOTE ADULT - SUBJECTIVE AND OBJECTIVE BOX
Nephrology progress note    Patient is seen and examined, events over the last 24 h noted .    Allergies:  No Known Allergies    Hospital Medications:   MEDICATIONS  (STANDING):  aspirin  chewable 81 milliGRAM(s) Oral daily  atorvastatin 40 milliGRAM(s) Oral at bedtime  citalopram 20 milliGRAM(s) Oral daily  heparin  Injectable 5000 Unit(s) SubCutaneous every 12 hours  hydrALAZINE 50 milliGRAM(s) Oral every 8 hours  NIFEdipine XL 60 milliGRAM(s) Oral daily  OLANZapine 10 milliGRAM(s) Oral daily        VITALS:  T(F): 97.1 (18 @ 05:00), Max: 99.6 (18 @ 21:07)  HR: 93 (18 @ 05:00)  BP: 198/91 (18 @ 05:00)  RR: 19 (18 @ 05:00)  SpO2: --  Wt(kg): --    09-10 @ 07:01  -   @ 07:00  --------------------------------------------------------  IN: 1450 mL / OUT: 1805 mL / NET: -355 mL     @ 07:01  -   @ 07:00  --------------------------------------------------------  IN: 500 mL / OUT: 1250 mL / NET: -750 mL          PHYSICAL EXAM:  Constitutional: NAD  HEENT: anicteric sclera, oropharynx clear, MMM  Neck: No JVD  Respiratory: CTAB, no wheezes, rales or rhonchi  Cardiovascular: S1, S2, RRR  Gastrointestinal: BS+, soft, NT/ND  Extremities: No cyanosis or clubbing. No peripheral edema  :  No patterson.   Skin: No rashes    LABS:      137  |  102  |  43<H>  ----------------------------<  92  4.5   |  23  |  4.4<HH>    Creatinine Trend: 4.4<--, 4.5<--, 4.8<--, 4.6<--, 4.6<--, TNP<--  Ca    9.0      11 Sep 2018 06:46                            8.7    4.89  )-----------( 167      ( 11 Sep 2018 06:46 )             26.5       Urine Studies:  Urinalysis Basic - ( 07 Sep 2018 16:50 )    Color: Yellow / Appearance: Clear / S.020 / pH:   Gluc:  / Ketone: Negative  / Bili: Negative / Urobili: 0.2 mg/dL   Blood:  / Protein: 100 mg/dL / Nitrite: Negative   Leuk Esterase: Negative / RBC:  / WBC    Sq Epi:  / Non Sq Epi: Occasional /HPF / Bacteria:       Creatinine, Random Urine: 60 mg/dL ( @ 16:54)  Sodium, Random Urine: 20.0 mmoL/L ( @ 13:52)  Osmolality, Random Urine: 378 mos/kg ( @ 13:52)  Potassium, Random Urine: 3 mmol/L ( @ 13:52)  Creatinine, Random Urine: 78 mg/dL ( @ 13:52)  Sodium, Random Urine: 69.0 mmoL/L ( @ 13:52)    RADIOLOGY & ADDITIONAL STUDIES:  < from: US Kidney and Bladder (18 @ 22:31) >  Each kidney is echogenic with no hydronephrosis, solid mass, stone or  perinephric fluid collection. The atrophic right kidney measures 6.2 cm   in length x 3.6 cm in width x 3.3 cm AP. The left kidney measures 9.3 cm   x 4 cm x 4.2 cm    Urinary bladder. Patterson catheter.    < end of copied text > Nephrology progress note    Patient is seen and examined, events over the last 24 h noted .  Denied SOB   no other complaints     Allergies:  No Known Allergies    Hospital Medications:   MEDICATIONS  (STANDING):  aspirin  chewable 81 milliGRAM(s) Oral daily  atorvastatin 40 milliGRAM(s) Oral at bedtime  citalopram 20 milliGRAM(s) Oral daily  heparin  Injectable 5000 Unit(s) SubCutaneous every 12 hours  hydrALAZINE 50 milliGRAM(s) Oral every 8 hours  NIFEdipine XL 60 milliGRAM(s) Oral daily  OLANZapine 10 milliGRAM(s) Oral daily        VITALS:  T(F): 97.1 (18 @ 05:00), Max: 99.6 (18 @ 21:07)  HR: 93 (18 @ 05:00)  BP: 198/91 (18 @ 05:00)  RR: 19 (18 @ 05:00)      09-10 @ 07:  -   @ 07:00  --------------------------------------------------------  IN: 1450 mL / OUT: 1805 mL / NET: -355 mL     @ 07:01  -   @ 07:00  --------------------------------------------------------  IN: 500 mL / OUT: 1250 mL / NET: -750 mL          PHYSICAL EXAM:  Constitutional: NAD confused   HEENT: anicteric sclera, oropharynx clear, MMM  Neck: No JVD  Respiratory: CTAB, no wheezes, rales or rhonchi  Cardiovascular: S1, S2, RRR  Gastrointestinal: BS+, soft, NT/ND  Extremities: No cyanosis or clubbing. No peripheral edema  :  No patterson.   Skin: No rashes    LABS:      137  |  102  |  43<H>  ----------------------------<  92  4.5   |  23  |  4.4<HH>    Creatinine Trend: 4.4<--, 4.5<--, 4.8<--, 4.6<--, 4.6<--, TNP<--  Ca    9.0      11 Sep 2018 06:46                            8.7    4.89  )-----------( 167      ( 11 Sep 2018 06:46 )             26.5       Urine Studies:  Urinalysis Basic - ( 07 Sep 2018 16:50 )    Color: Yellow / Appearance: Clear / S.020 / pH:   Gluc:  / Ketone: Negative  / Bili: Negative / Urobili: 0.2 mg/dL   Blood:  / Protein: 100 mg/dL / Nitrite: Negative   Leuk Esterase: Negative / RBC:  / WBC    Sq Epi:  / Non Sq Epi: Occasional /HPF / Bacteria:       Creatinine, Random Urine: 60 mg/dL ( @ 16:54)  Sodium, Random Urine: 20.0 mmoL/L ( @ 13:52)  Osmolality, Random Urine: 378 mos/kg ( @ 13:52)  Potassium, Random Urine: 3 mmol/L ( @ 13:52)  Creatinine, Random Urine: 78 mg/dL ( @ 13:52)  Sodium, Random Urine: 69.0 mmoL/L ( @ 13:52)    RADIOLOGY & ADDITIONAL STUDIES:  < from: US Kidney and Bladder (18 @ 22:31) >  Each kidney is echogenic with no hydronephrosis, solid mass, stone or  perinephric fluid collection. The atrophic right kidney measures 6.2 cm   in length x 3.6 cm in width x 3.3 cm AP. The left kidney measures 9.3 cm   x 4 cm x 4.2 cm    Urinary bladder. Patterson catheter.    < end of copied text >

## 2018-09-12 NOTE — PROGRESS NOTE ADULT - ASSESSMENT
68 years old female pt with past medical hx of type 2 DM, CKD stage 4 baseline crt 2.23 ,hypertension, alzheimer disease,  depression, delusional disorder, hyperlipidemia transferred to [facility] ER because of fall and low bp 80/50.     # WILIAM on ckd stage 4     creatinine is stable around 4.4-4.6 ( after iv hydration) likely worsening of CKD     renal sonogram showed atrophic right kidney     do renal doppler to rule out renal artery stenosis     pth/ phosphorus noted     Phosphorus  at goal     can start cholecalciferol, check vitamin d 1,25     can stop iv fluids, encourage oral hydration     discontinue foleys catheter    # HTN uncontrolled     do renal doppler     on nifedipine 60 XL     hydralazine added, if remained uncontrolled can increase hydralazine 50 mg TID    # anemia likely chronic     hb stable around 8.7     iron studies noted     can start oral ferrous sulphate BID    # acute confusional state likely metabolic     ct scan head chronic microvascular changes    will follow 68 years old female pt with past medical hx of type 2 DM, CKD stage 4 baseline crt 2.23 ,hypertension, alzheimer disease,  depression, delusional disorder, hyperlipidemia transferred to [facility] ER because of fall and low bp 80/50.     # WILIAM on ckd stage 4     creatinine is stable around 4.4-4.6 ( after iv hydration) likely worsening of CKD     renal sonogram showed atrophic right kidney      can do renal doppler to rule out renal artery stenosis     pth/ phosphorus noted     Phosphorus  at goal     can start cholecalciferol, check vitamin d 1,25      # HTN uncontrolled     do renal doppler     on nifedipine 60 XL     hydralazine added, increase hydralazine 50 mg TID    # anemia likely chronic     hb stable around 8.7     iron studies noted     can start oral ferrous sulphate BID    # acute confusional state likely metabolic     ct scan head chronic microvascular changes    will follow

## 2018-09-12 NOTE — PROGRESS NOTE ADULT - SUBJECTIVE AND OBJECTIVE BOX
68 years old female pt with past medical hx of type 2 DM, CKD stage 4 baseline crt 2.23 ,hypertension, alzheimer disease depression, delusional disorder, hyperlipidemia transferred to [facility] ER because of fall this morning, when her vitals were checked bp was low 80/50. She has been weak and lethargic for few weeks, has poor appetite.  she denies any vomiting, diarrhoea or abdominal pain, no change in her urinary habit, no fever.  she is always confused baseline, so no change in her mental change was noticed.  no weight change recently.  she denies any cough, chest pain, no SOB, has noticed increased tremors .  In ER stroke code was called, but as per neurology no intervention, no acute pathology on ct scan head, received total of 2 L of LR (07 Sep 2018 16:11)    SUBJECTIVE:  Patient is a 68y old Female who presents with a chief complaint of fall and low BP 80/50 (12 Sep 2018 11:06)  Currently admitted to medicine with the primary diagnosis of Uremic encephalopathy       INTERVAL HISTORY;  Today is hospital day 6d. This morning she is resting comfortably in bed and reports no new issues or overnight events.     PAST MEDICAL & SURGICAL HISTORY  Depression, unspecified depression type  Alzheimer disease  Diabetes  HTN (hypertension)  High cholesterol  No significant past surgical history    SOCIAL HISTORY:  Negative for smoking/alcohol/drug use.     ALLERGIES:  No Known Allergies    MEDICATIONS:  STANDING MEDICATIONS  aspirin  chewable 81 milliGRAM(s) Oral daily  atorvastatin 40 milliGRAM(s) Oral at bedtime  citalopram 20 milliGRAM(s) Oral daily  heparin  Injectable 5000 Unit(s) SubCutaneous every 12 hours  hydrALAZINE 50 milliGRAM(s) Oral every 8 hours  NIFEdipine XL 60 milliGRAM(s) Oral daily  OLANZapine 10 milliGRAM(s) Oral daily    PRN MEDICATIONS    VITALS:   T(F): 97.1  HR: 93  BP: 198/91  RR: 19  SpO2: --    LABS:                        8.7    4.89  )-----------( 167      ( 11 Sep 2018 06:46 )             26.5     09-11    137  |  102  |  43<H>  ----------------------------<  92  4.5   |  23  |  4.4<HH>    Ca    9.0      11 Sep 2018 06:46      RADIOLOGY:    PHYSICAL EXAM:  GEN: No acute distress  LUNGS: Clear to auscultation bilaterally   HEART: S1/S2 present. RRR.   ABD: Soft, non-tender, non-distended. Bowel sounds present  EXT: NC/NC/NE/2+PP/NEWELL/Skin Intact.   NEURO: AAOX3    Intravenous access:   NG tube:   Fonseca cathter: Indwelling Urethral Catheter:     Connect To:  Straight Drainage/Gravity    Indication:  Urine Output Monitoring in Critically Ill (09-07-18 @ 16:47) (not performed) [active]

## 2018-09-12 NOTE — PROGRESS NOTE ADULT - ASSESSMENT
WILIAM on CKD 4: scr stable renal on board. no indication for RRT OPT follow up with nephrology     HTN: renal duplex pending. not controlled hydralazine increased to 50 TID and nifidipine to 90 qday     no evidence Metabolic encephalopathy likely this is related to her dementia     anemia chronic and stable     DVT PPX

## 2018-09-13 LAB
ANION GAP SERPL CALC-SCNC: 17 MMOL/L — HIGH (ref 7–14)
BASOPHILS # BLD AUTO: 0.01 K/UL — SIGNIFICANT CHANGE UP (ref 0–0.2)
BASOPHILS NFR BLD AUTO: 0.1 % — SIGNIFICANT CHANGE UP (ref 0–1)
BUN SERPL-MCNC: 47 MG/DL — HIGH (ref 10–20)
CALCIUM SERPL-MCNC: 9.5 MG/DL — SIGNIFICANT CHANGE UP (ref 8.5–10.1)
CHLORIDE SERPL-SCNC: 99 MMOL/L — SIGNIFICANT CHANGE UP (ref 98–110)
CO2 SERPL-SCNC: 22 MMOL/L — SIGNIFICANT CHANGE UP (ref 17–32)
CREAT SERPL-MCNC: 5.3 MG/DL — CRITICAL HIGH (ref 0.7–1.5)
EOSINOPHIL # BLD AUTO: 0.02 K/UL — SIGNIFICANT CHANGE UP (ref 0–0.7)
EOSINOPHIL NFR BLD AUTO: 0.3 % — SIGNIFICANT CHANGE UP (ref 0–8)
GLUCOSE BLDC GLUCOMTR-MCNC: 114 MG/DL — HIGH (ref 70–99)
GLUCOSE BLDC GLUCOMTR-MCNC: 117 MG/DL — HIGH (ref 70–99)
GLUCOSE BLDC GLUCOMTR-MCNC: 127 MG/DL — HIGH (ref 70–99)
GLUCOSE BLDC GLUCOMTR-MCNC: 141 MG/DL — HIGH (ref 70–99)
GLUCOSE SERPL-MCNC: 113 MG/DL — HIGH (ref 70–99)
HCT VFR BLD CALC: 32 % — LOW (ref 37–47)
HGB BLD-MCNC: 10.2 G/DL — LOW (ref 12–16)
IMM GRANULOCYTES NFR BLD AUTO: 0.1 % — SIGNIFICANT CHANGE UP (ref 0.1–0.3)
LYMPHOCYTES # BLD AUTO: 0.96 K/UL — LOW (ref 1.2–3.4)
LYMPHOCYTES # BLD AUTO: 12.3 % — LOW (ref 20.5–51.1)
MAGNESIUM SERPL-MCNC: 2 MG/DL — SIGNIFICANT CHANGE UP (ref 1.8–2.4)
MCHC RBC-ENTMCNC: 31.4 PG — HIGH (ref 27–31)
MCHC RBC-ENTMCNC: 31.9 G/DL — LOW (ref 32–37)
MCV RBC AUTO: 98.5 FL — SIGNIFICANT CHANGE UP (ref 81–99)
MONOCYTES # BLD AUTO: 0.78 K/UL — HIGH (ref 0.1–0.6)
MONOCYTES NFR BLD AUTO: 10 % — HIGH (ref 1.7–9.3)
NEUTROPHILS # BLD AUTO: 6.05 K/UL — SIGNIFICANT CHANGE UP (ref 1.4–6.5)
NEUTROPHILS NFR BLD AUTO: 77.2 % — HIGH (ref 42.2–75.2)
NRBC # BLD: 0 /100 WBCS — SIGNIFICANT CHANGE UP (ref 0–0)
PLATELET # BLD AUTO: 164 K/UL — SIGNIFICANT CHANGE UP (ref 130–400)
POTASSIUM SERPL-MCNC: 4.2 MMOL/L — SIGNIFICANT CHANGE UP (ref 3.5–5)
POTASSIUM SERPL-SCNC: 4.2 MMOL/L — SIGNIFICANT CHANGE UP (ref 3.5–5)
RBC # BLD: 3.25 M/UL — LOW (ref 4.2–5.4)
RBC # FLD: 11.8 % — SIGNIFICANT CHANGE UP (ref 11.5–14.5)
SODIUM SERPL-SCNC: 138 MMOL/L — SIGNIFICANT CHANGE UP (ref 135–146)
WBC # BLD: 7.83 K/UL — SIGNIFICANT CHANGE UP (ref 4.8–10.8)
WBC # FLD AUTO: 7.83 K/UL — SIGNIFICANT CHANGE UP (ref 4.8–10.8)

## 2018-09-13 RX ORDER — LABETALOL HCL 100 MG
100 TABLET ORAL EVERY 12 HOURS
Qty: 0 | Refills: 0 | Status: DISCONTINUED | OUTPATIENT
Start: 2018-09-13 | End: 2018-09-19

## 2018-09-13 RX ADMIN — Medication 75 MILLIGRAM(S): at 21:24

## 2018-09-13 RX ADMIN — Medication 90 MILLIGRAM(S): at 05:36

## 2018-09-13 RX ADMIN — CITALOPRAM 20 MILLIGRAM(S): 10 TABLET, FILM COATED ORAL at 12:22

## 2018-09-13 RX ADMIN — Medication 75 MILLIGRAM(S): at 13:45

## 2018-09-13 RX ADMIN — OLANZAPINE 10 MILLIGRAM(S): 15 TABLET, FILM COATED ORAL at 12:22

## 2018-09-13 RX ADMIN — HEPARIN SODIUM 5000 UNIT(S): 5000 INJECTION INTRAVENOUS; SUBCUTANEOUS at 05:36

## 2018-09-13 RX ADMIN — HEPARIN SODIUM 5000 UNIT(S): 5000 INJECTION INTRAVENOUS; SUBCUTANEOUS at 18:29

## 2018-09-13 RX ADMIN — Medication 100 MILLIGRAM(S): at 18:29

## 2018-09-13 RX ADMIN — ATORVASTATIN CALCIUM 40 MILLIGRAM(S): 80 TABLET, FILM COATED ORAL at 21:24

## 2018-09-13 RX ADMIN — Medication 81 MILLIGRAM(S): at 12:22

## 2018-09-13 RX ADMIN — Medication 75 MILLIGRAM(S): at 05:35

## 2018-09-13 RX ADMIN — Medication 100 MILLIGRAM(S): at 10:05

## 2018-09-13 NOTE — PROGRESS NOTE ADULT - SUBJECTIVE AND OBJECTIVE BOX
68 years old female pt with past medical hx of type 2 DM, CKD stage 4 baseline crt 2.23 ,hypertension, alzheimer disease depression, delusional disorder, hyperlipidemia transferred to [facility] ER because of fall this morning, when her vitals were checked bp was low 80/50. She has been weak and lethargic for few weeks, has poor appetite.  she denies any vomiting, diarrhoea or abdominal pain, no change in her urinary habit, no fever.  she is always confused baseline, so no change in her mental change was noticed.  no weight change recently.  she denies any cough, chest pain, no SOB, has noticed increased tremors .  In ER stroke code was called, but as per neurology no intervention, no acute pathology on ct scan head, received total of 2 L of LR (07 Sep 2018 16:11) 68 years old female pt with past medical hx of type 2 DM, CKD stage 4 baseline crt 2.23 ,hypertension, alzheimer disease depression, delusional disorder, hyperlipidemia transferred to [facility] ER because of fall this morning, when her vitals were checked bp was low 80/50. She has been weak and lethargic for few weeks, has poor appetite.  she denies any vomiting, diarrhoea or abdominal pain, no change in her urinary habit, no fever.  she is always confused baseline, so no change in her mental change was noticed.  no weight change recently.  she denies any cough, chest pain, no SOB, has noticed increased tremors.  In ER stroke code was called, but as per neurology no intervention, no acute pathology on ct scan head, received total of 2 L of LR (07 Sep 2018 16:11)    SUBJECTIVE:  Patient is a 68y old Female who presents with a chief complaint of fall and low BP 80/50 (13 Sep 2018 14:40)    Currently admitted to medicine with the primary diagnosis of Uremic encephalopathy     Today is hospital day 6d. This morning she is resting comfortably in bed and reports no new issues or overnight events.     PAST MEDICAL & SURGICAL HISTORY  Depression, unspecified depression type  Alzheimer disease  Diabetes  HTN (hypertension)  High cholesterol  No significant past surgical history    SOCIAL HISTORY:  Negative for smoking/alcohol/drug use.     ALLERGIES:  No Known Allergies    MEDICATIONS:  STANDING MEDICATIONS  aspirin  chewable 81 milliGRAM(s) Oral daily  atorvastatin 40 milliGRAM(s) Oral at bedtime  citalopram 20 milliGRAM(s) Oral daily  heparin  Injectable 5000 Unit(s) SubCutaneous every 12 hours  hydrALAZINE 75 milliGRAM(s) Oral every 8 hours  labetalol 100 milliGRAM(s) Oral every 12 hours  NIFEdipine XL 90 milliGRAM(s) Oral daily  OLANZapine 10 milliGRAM(s) Oral daily    PRN MEDICATIONS    VITALS:   T(F): 99.8  HR: 95  BP: 160/75  RR: 18  SpO2: --    LABS:                        10.2   7.83  )-----------( 164      ( 13 Sep 2018 11:16 )             32.0       Mg     2.0     09-13      RADIOLOGY:      PHYSICAL EXAM:  GEN: No acute distress  LUNGS: Clear to auscultation bilaterally   HEART: S1/S2 present. RRR.   ABD: Soft, non-tender, non-distended. Bowel sounds present  EXT: NC/NC/NE/2+PP/NEWELL/Skin Intact.   NEURO: AAOX3    Intravenous access:   NG tube:   Fonseca cathter: Indwelling Urethral Catheter:     Connect To:  Straight Drainage/Gravity    Indication:  Urine Output Monitoring in Critically Ill (09-07-18 @ 16:47) (not performed) [active] 68 years old female pt with past medical hx of type 2 DM, CKD stage 4 baseline crt 2.23 ,hypertension, alzheimer disease depression, delusional disorder, hyperlipidemia transferred to [facility] ER because of fall this morning, when her vitals were checked bp was low 80/50. She has been weak and lethargic for few weeks, has poor appetite.  she denies any vomiting, diarrhoea or abdominal pain, no change in her urinary habit, no fever.  she is always confused baseline, so no change in her mental change was noticed.  no weight change recently.  she denies any cough, chest pain, no SOB, has noticed increased tremors.  In ER stroke code was called, but as per neurology no intervention, no acute pathology on ct scan head, received total of 2 L of LR (07 Sep 2018 16:11)    SUBJECTIVE:  Patient is a 68y old Female who presents with a chief complaint of fall and low BP 80/50 (13 Sep 2018 14:40)  Currently admitted to medicine with the primary diagnosis of encephalopathy (toxic/metabolic)    INTERVAL HISTORY;  Today is hospital day 7d. This morning she is resting comfortably in bed and reports no new issues or overnight events.     PAST MEDICAL & SURGICAL HISTORY  Depression, unspecified depression type  Alzheimer disease  Diabetes  HTN (hypertension)  High cholesterol  No significant past surgical history    SOCIAL HISTORY:  Negative for smoking/alcohol/drug use.     ALLERGIES:  No Known Allergies    MEDICATIONS:  STANDING MEDICATIONS  aspirin  chewable 81 milliGRAM(s) Oral daily  atorvastatin 40 milliGRAM(s) Oral at bedtime  citalopram 20 milliGRAM(s) Oral daily  heparin  Injectable 5000 Unit(s) SubCutaneous every 12 hours  hydrALAZINE 75 milliGRAM(s) Oral every 8 hours  labetalol 100 milliGRAM(s) Oral every 12 hours  NIFEdipine XL 90 milliGRAM(s) Oral daily  OLANZapine 10 milliGRAM(s) Oral daily    PRN MEDICATIONS    VITALS:   T(F): 99.8  HR: 95  BP: 160/75  RR: 18  SpO2: --    LABS:                        10.2   7.83  )-----------( 164      ( 13 Sep 2018 11:16 )             32.0       Mg     2.0     09-13      RADIOLOGY:  < from: VA Duplex Pelvic Vasculature, Limited (09.12.18 @ 10:49) >  Impression:  No evIdence of significant hemodynamic stenosis noted in bilateral renal   arteries.      < from: US Kidney and Bladder (09.07.18 @ 22:31) >  Impression:  1.  Bilateral renal disease. No hydronephrosis.  2.  Nondiagnostic examination of the urinary bladder.      < from: Xray Chest 1 View AP/PA (09.07.18 @ 14:03) >  IMPRESSION:   No radiographic evidence of acute cardiopulmonary disease.      < from: CT Angio Neck w/ IV Cont (09.07.18 @ 12:46) >  Impression:  CTA neck: No significant vascular stenosis within the neck.    CTA head: No large vessel occlusion identified.    Focal stenosis involving a distal segment of the right posterior cerebral   artery with patency of the distal vessel.      < from: CT Brain Stroke Protocol (09.07.18 @ 12:17) >  IMPRESSION:   No evidence of acute intracranial hemorrhage or mass effect.  Chronic microvascular ischemic changes.    PHYSICAL EXAM:  GEN: No acute distress  LUNGS: Clear to auscultation bilaterally   HEART: S1/S2 present. RRR.   ABD: Soft, non-tender, non-distended. Bowel sounds present  EXT: NC/NC/NE/2+PP/NEWELL/Skin Intact.   NEURO: AAOX3

## 2018-09-13 NOTE — PROGRESS NOTE ADULT - SUBJECTIVE AND OBJECTIVE BOX
Nephrology progress note    Patient was seen and examined, events over the last 24 h noted .  N oacute change  no BMP today    Allergies:  No Known Allergies    Hospital Medications:   MEDICATIONS  (STANDING):  aspirin  chewable 81 milliGRAM(s) Oral daily  atorvastatin 40 milliGRAM(s) Oral at bedtime  citalopram 20 milliGRAM(s) Oral daily  heparin  Injectable 5000 Unit(s) SubCutaneous every 12 hours  hydrALAZINE 75 milliGRAM(s) Oral every 8 hours  labetalol 100 milliGRAM(s) Oral every 12 hours  NIFEdipine XL 90 milliGRAM(s) Oral daily  OLANZapine 10 milliGRAM(s) Oral daily        VITALS:  T(F): 99.8 (18 @ 13:23), Max: 100 (18 @ 06:04)  HR: 95 (18 @ 13:23)  BP: 160/75 (18 @ 13:23)  RR: 18 (18 @ 13:23)  SpO2: --  Wt(kg): --     @ 07:01  -   @ 07:00  --------------------------------------------------------  IN: 500 mL / OUT: 1250 mL / NET: -750 mL          PHYSICAL EXAM:  Constitutional: NAD confused   HEENT: anicteric sclera, oropharynx clear, MMM  Neck: No JVD  Respiratory: CTAB, no wheezes, rales or rhonchi  Cardiovascular: S1, S2, RRR  Gastrointestinal: BS+, soft, NT/ND  Extremities: No cyanosis or clubbing. No peripheral edema  :  No patterson.   Skin: No rashes  LABS:                              10.2   7.83  )-----------( 164      ( 13 Sep 2018 11:16 )             32.0       Urine Studies:  Urinalysis Basic - ( 07 Sep 2018 16:50 )    Color: Yellow / Appearance: Clear / S.020 / pH:   Gluc:  / Ketone: Negative  / Bili: Negative / Urobili: 0.2 mg/dL   Blood:  / Protein: 100 mg/dL / Nitrite: Negative   Leuk Esterase: Negative / RBC:  / WBC    Sq Epi:  / Non Sq Epi: Occasional /HPF / Bacteria:       Creatinine, Random Urine: 60 mg/dL ( @ 16:54)  Chloride, Random Urine: 61.3 ( @ 16:54)  Sodium, Random Urine: 20.0 mmoL/L ( @ 13:52)  Osmolality, Random Urine: 378 mos/kg ( @ 13:52)  Potassium, Random Urine: 3 mmol/L ( @ 13:52)  Creatinine, Random Urine: 78 mg/dL ( @ 13:52)  Sodium, Random Urine: 69.0 mmoL/L ( @ 13:52)    RADIOLOGY & ADDITIONAL STUDIES:

## 2018-09-13 NOTE — PROGRESS NOTE ADULT - SUBJECTIVE AND OBJECTIVE BOX
no change in status   comfortable in bed     Vital Signs Last 24 Hrs  T(C): 37.8 (13 Sep 2018 06:04), Max: 37.8 (13 Sep 2018 06:04)  T(F): 100 (13 Sep 2018 06:04), Max: 100 (13 Sep 2018 06:04)  HR: 96 (13 Sep 2018 06:54) (90 - 104)  BP: 163/80 (13 Sep 2018 06:54) (163/80 - 193/86)  BP(mean): --  RR: 18 (13 Sep 2018 06:04) (18 - 19)  SpO2: --    PHYSICAL EXAM:  GENERAL: NAD, well-developed  HEAD:  Atraumatic, Normocephalic  EYES: EOMI, PERRLA, conjunctiva and sclera clear  NECK: Supple, No JVD  Pulm: Clear to auscultation bilaterally; No wheeze  CV: Regular rate and rhythm; No murmurs, rubs, or gallops  GI: Soft, Nontender, Nondistended; Bowel sounds present  EXTREMITIES:  2+ Peripheral Pulses, No clubbing, cyanosis, or edema  NEUROLOGY: non-focal  SKIN: No rashes or lesions

## 2018-09-13 NOTE — PROGRESS NOTE ADULT - ASSESSMENT
68 years old female pt with past medical hx of type 2 DM, CKD stage 4 baseline crt 2.23 ,hypertension, alzheimer disease,  depression, delusional disorder, hyperlipidemia transferred to [facility] ER because of fall and low bp 80/50.     WILIAM on ckd stage 4     creatinine is stable around 4.4-4.6 ( after iv hydration) likely worsening of CKD     renal sonogram showed atrophic right kidney      No evidence of significant hemodynamic stenosis noted in bilateral renal  arteries on doppler     pth/ phosphorus noted     Phosphorus  at goal     can start cholecalciferol, check vitamin d 1,25      HTN uncontrolled     do renal doppler     on nifedipine 60 XL     and hydralazine   increase nifedipine to 120    anemia likely chronic     hb stable around 8.7     iron studies noted     can start oral ferrous sulphate BID

## 2018-09-13 NOTE — PROGRESS NOTE ADULT - ASSESSMENT
68 years old female pt with past medical hx of type 2 DM, CKD stage 4 baseline crt 2.23 ,hypertension, alzheimer disease depression, delusional disorder, hyperlipidemia transferred to [facility] ER because of fall this morning, when her vitals were checked bp was low 80/50. She has been weak and lethargic for few weeks, has poor appetite.    In ER stroke code was called, but as per neurology no intervention, no acute pathology on ct scan head, received total of 2 L of LR (07 Sep 2018 16:11)    # WILIAM on CKD stage 4   - Serum creatinine since admission has been 4.6 - 4.8 - 4.5 - 4.4 - 5.3  - creatinine in last 2017 2.23  - VA duplex pelvic vessels did not show renal artery stenosis  - WILIAM Most likely 2/2 low intake and dehydration.  - received iv hydration 0.9 % NS at 75 cc/hour  - Urine sodium 20, k 3, osmolality 378  (9/8)  - Po4 was 4.7 on 9/7  - bladder scan 9/7 showed   1.  Bilateral renal disease. No hydronephrosis.  2.  Nondiagnostic examination of the urinary bladder.      # anemia normocytic  - Iron studies done 9/8; ferritin 195, iron 47, iron binding capacity 153, total iron binding capacity 200, percentage saturation 24%.  - TSH (9/8) 1.64  - Hb has been 9.6 - 8.7 - 8.7 - 8.7 - 10.2    # Hypertension  - were held off initially because of low BP. Now on Nifedipine, labetalol and hydralazine    # type 2 DM   - Not on long term insulin  - check blood glucose AC+HS  - Start insulin if blood glucose is consistently above 180  - last hba1c was 6      # depression and delusion disorder  - on home meds - zyprexa abd celexa    # DVT prophylaxis; Heparin  #  diet renal diet, with low K  # activity fall precaution  # full code

## 2018-09-13 NOTE — PROGRESS NOTE ADULT - ASSESSMENT
WILIAM on CKD 4: scr stable renal on board. no indication for RRT OPT follow up with nephrology. check bmp today     HTN: renal duplex neg for stenosis .on nifidipine 90 qday , hydralazine 75 q8h. still not well controlled will add labetalol 100 q12h     no evidence Metabolic encephalopathy likely this is related to her dementia     anemia chronic and stable     DVT PPX     pending d/c to snf

## 2018-09-14 LAB
ANION GAP SERPL CALC-SCNC: 20 MMOL/L — HIGH (ref 7–14)
BASOPHILS # BLD AUTO: 0.02 K/UL — SIGNIFICANT CHANGE UP (ref 0–0.2)
BASOPHILS NFR BLD AUTO: 0.4 % — SIGNIFICANT CHANGE UP (ref 0–1)
BUN SERPL-MCNC: 54 MG/DL — HIGH (ref 10–20)
CALCIUM SERPL-MCNC: 9.4 MG/DL — SIGNIFICANT CHANGE UP (ref 8.5–10.1)
CHLORIDE SERPL-SCNC: 99 MMOL/L — SIGNIFICANT CHANGE UP (ref 98–110)
CO2 SERPL-SCNC: 21 MMOL/L — SIGNIFICANT CHANGE UP (ref 17–32)
CREAT SERPL-MCNC: 5.4 MG/DL — CRITICAL HIGH (ref 0.7–1.5)
EOSINOPHIL # BLD AUTO: 0.14 K/UL — SIGNIFICANT CHANGE UP (ref 0–0.7)
EOSINOPHIL NFR BLD AUTO: 2.5 % — SIGNIFICANT CHANGE UP (ref 0–8)
GLUCOSE BLDC GLUCOMTR-MCNC: 100 MG/DL — HIGH (ref 70–99)
GLUCOSE BLDC GLUCOMTR-MCNC: 106 MG/DL — HIGH (ref 70–99)
GLUCOSE BLDC GLUCOMTR-MCNC: 165 MG/DL — HIGH (ref 70–99)
GLUCOSE BLDC GLUCOMTR-MCNC: 170 MG/DL — HIGH (ref 70–99)
GLUCOSE BLDC GLUCOMTR-MCNC: 96 MG/DL — SIGNIFICANT CHANGE UP (ref 70–99)
GLUCOSE SERPL-MCNC: 93 MG/DL — SIGNIFICANT CHANGE UP (ref 70–99)
HCT VFR BLD CALC: 29.3 % — LOW (ref 37–47)
HGB BLD-MCNC: 9.5 G/DL — LOW (ref 12–16)
IMM GRANULOCYTES NFR BLD AUTO: 0.2 % — SIGNIFICANT CHANGE UP (ref 0.1–0.3)
LYMPHOCYTES # BLD AUTO: 1.34 K/UL — SIGNIFICANT CHANGE UP (ref 1.2–3.4)
LYMPHOCYTES # BLD AUTO: 23.6 % — SIGNIFICANT CHANGE UP (ref 20.5–51.1)
MAGNESIUM SERPL-MCNC: 2 MG/DL — SIGNIFICANT CHANGE UP (ref 1.8–2.4)
MCHC RBC-ENTMCNC: 31.5 PG — HIGH (ref 27–31)
MCHC RBC-ENTMCNC: 32.4 G/DL — SIGNIFICANT CHANGE UP (ref 32–37)
MCV RBC AUTO: 97 FL — SIGNIFICANT CHANGE UP (ref 81–99)
MONOCYTES # BLD AUTO: 0.59 K/UL — SIGNIFICANT CHANGE UP (ref 0.1–0.6)
MONOCYTES NFR BLD AUTO: 10.4 % — HIGH (ref 1.7–9.3)
NEUTROPHILS # BLD AUTO: 3.58 K/UL — SIGNIFICANT CHANGE UP (ref 1.4–6.5)
NEUTROPHILS NFR BLD AUTO: 62.9 % — SIGNIFICANT CHANGE UP (ref 42.2–75.2)
NRBC # BLD: 0 /100 WBCS — SIGNIFICANT CHANGE UP (ref 0–0)
PLATELET # BLD AUTO: 179 K/UL — SIGNIFICANT CHANGE UP (ref 130–400)
POTASSIUM SERPL-MCNC: 4.5 MMOL/L — SIGNIFICANT CHANGE UP (ref 3.5–5)
POTASSIUM SERPL-SCNC: 4.5 MMOL/L — SIGNIFICANT CHANGE UP (ref 3.5–5)
RBC # BLD: 3.02 M/UL — LOW (ref 4.2–5.4)
RBC # FLD: 11.9 % — SIGNIFICANT CHANGE UP (ref 11.5–14.5)
SODIUM SERPL-SCNC: 140 MMOL/L — SIGNIFICANT CHANGE UP (ref 135–146)
WBC # BLD: 5.68 K/UL — SIGNIFICANT CHANGE UP (ref 4.8–10.8)
WBC # FLD AUTO: 5.68 K/UL — SIGNIFICANT CHANGE UP (ref 4.8–10.8)

## 2018-09-14 RX ORDER — FERROUS SULFATE 325(65) MG
325 TABLET ORAL
Qty: 0 | Refills: 0 | Status: DISCONTINUED | OUTPATIENT
Start: 2018-09-14 | End: 2018-09-19

## 2018-09-14 RX ORDER — CHOLECALCIFEROL (VITAMIN D3) 125 MCG
1000 CAPSULE ORAL DAILY
Qty: 0 | Refills: 0 | Status: DISCONTINUED | OUTPATIENT
Start: 2018-09-14 | End: 2018-09-19

## 2018-09-14 RX ADMIN — HEPARIN SODIUM 5000 UNIT(S): 5000 INJECTION INTRAVENOUS; SUBCUTANEOUS at 17:13

## 2018-09-14 RX ADMIN — Medication 325 MILLIGRAM(S): at 17:13

## 2018-09-14 RX ADMIN — CITALOPRAM 20 MILLIGRAM(S): 10 TABLET, FILM COATED ORAL at 11:15

## 2018-09-14 RX ADMIN — Medication 90 MILLIGRAM(S): at 05:11

## 2018-09-14 RX ADMIN — Medication 81 MILLIGRAM(S): at 11:15

## 2018-09-14 RX ADMIN — Medication 75 MILLIGRAM(S): at 14:34

## 2018-09-14 RX ADMIN — Medication 75 MILLIGRAM(S): at 05:11

## 2018-09-14 RX ADMIN — Medication 75 MILLIGRAM(S): at 22:32

## 2018-09-14 RX ADMIN — HEPARIN SODIUM 5000 UNIT(S): 5000 INJECTION INTRAVENOUS; SUBCUTANEOUS at 05:11

## 2018-09-14 RX ADMIN — Medication 1000 UNIT(S): at 18:32

## 2018-09-14 RX ADMIN — ATORVASTATIN CALCIUM 40 MILLIGRAM(S): 80 TABLET, FILM COATED ORAL at 22:32

## 2018-09-14 RX ADMIN — OLANZAPINE 10 MILLIGRAM(S): 15 TABLET, FILM COATED ORAL at 11:15

## 2018-09-14 RX ADMIN — Medication 100 MILLIGRAM(S): at 17:13

## 2018-09-14 RX ADMIN — Medication 100 MILLIGRAM(S): at 05:11

## 2018-09-14 NOTE — PROGRESS NOTE ADULT - SUBJECTIVE AND OBJECTIVE BOX
SUBJECTIVE:    Patient is a 68y old Female who presents with a chief complaint of fall and low BP 80/50 (14 Sep 2018 11:04)    Currently admitted to medicine with the primary diagnosis of Uremic encephalopathy     Today is hospital day 7d. This morning she is resting comfortably in bed. Pt is not oriented, unable to answer questions.  Had advanced discussion with Son Simone dash ( ( [phone])) who doesnt want to initiate HD in future if need be.   Pt son states that his mother is progressing to stage 3 of dementia and is unable to comprehend things now ( previously could understand the talk). Pt is AAO x 0, mumbling infrequently.       PAST MEDICAL & SURGICAL HISTORY  Depression, unspecified depression type  Alzheimer disease  Diabetes  HTN (hypertension)  High cholesterol  No significant past surgical history    SOCIAL HISTORY:  Negative for smoking/alcohol/drug use.     ALLERGIES:  No Known Allergies    MEDICATIONS:  STANDING MEDICATIONS  aspirin  chewable 81 milliGRAM(s) Oral daily  atorvastatin 40 milliGRAM(s) Oral at bedtime  cholecalciferol 1000 Unit(s) Oral daily  citalopram 20 milliGRAM(s) Oral daily  ferrous    sulfate 325 milliGRAM(s) Oral two times a day  heparin  Injectable 5000 Unit(s) SubCutaneous every 12 hours  hydrALAZINE 75 milliGRAM(s) Oral every 8 hours  labetalol 100 milliGRAM(s) Oral every 12 hours  NIFEdipine XL 90 milliGRAM(s) Oral daily  OLANZapine 10 milliGRAM(s) Oral daily    PRN MEDICATIONS    VITALS:   T(F): 96.8  HR: 77  BP: 132/78  RR: 16  SpO2: --    LABS:                        9.5    5.68  )-----------( 179      ( 14 Sep 2018 08:00 )             29.3     09-14    140  |  99  |  54<H>  ----------------------------<  93  4.5   |  21  |  5.4<HH>    Ca    9.4      14 Sep 2018 08:00  Mg     2.0     09-14          RADIOLOGY:  < from: VA Duplex Pelvic Vasculature, Limited (09.12.18 @ 10:49) >  Impression:    No evidence of significant hemodynamic stenosis noted in bilateral renal   arteries.    < end of copied text >    PHYSICAL EXAM:  GEN: No acute distress, confused, not agressive  LUNGS: Clear to auscultation bilaterally   HEART: S1/S2 present. RRR.   ABD: Soft, non-tender, non-distended. Bowel sounds present  EXT: NC/NC/NE/2+PP/NEWELL  NEURO: AAOX0

## 2018-09-14 NOTE — DIETITIAN INITIAL EVALUATION ADULT. - OTHER INFO
Initial assessment fro LOS p/w: Initial assessment fro LOS p/w: s/p fall.  WILIAM on CKD stage 4 ( WILIAM on CKD vs CKD worsening)- -creatinine didnot improve with hydration.  anemia normocytic- on ferrous sulfate. HTN- well controlled. DM2: on insulin. Dispo: rehab.

## 2018-09-14 NOTE — PROGRESS NOTE ADULT - SUBJECTIVE AND OBJECTIVE BOX
Nephrology progress note    Patient was seen and examined, events over the last 24 h noted .  Acute rise in Cr yesterday 4.4 ->5.3 today stable  though does not reflect much loss of GFR    Allergies:  No Known Allergies    Hospital Medications:   MEDICATIONS  (STANDING):  aspirin  chewable 81 milliGRAM(s) Oral daily  atorvastatin 40 milliGRAM(s) Oral at bedtime  citalopram 20 milliGRAM(s) Oral daily  heparin  Injectable 5000 Unit(s) SubCutaneous every 12 hours  hydrALAZINE 75 milliGRAM(s) Oral every 8 hours  labetalol 100 milliGRAM(s) Oral every 12 hours  NIFEdipine XL 90 milliGRAM(s) Oral daily  OLANZapine 10 milliGRAM(s) Oral daily        VITALS:  T(F): 97.3 (18 @ 04:20), Max: 99.8 (18 @ 13:23)  HR: 77 (18 @ 06:14)  BP: 129/73 (18 @ 06:14)  RR: 18 (18 @ 04:20)  SpO2: --  Wt(kg): --    Height (cm): 165.1 ( @ 20:34)  Weight (kg): 74.3 ( @ 20:34)  BMI (kg/m2): 27.3 ( @ 20:34)  BSA (m2): 1.82 ( @ 20:34)    PHYSICAL EXAM:  Constitutional: NAD confused   HEENT: anicteric sclera, oropharynx clear, MMM  Neck: No JVD  Respiratory: CTAB, no wheezes, rales or rhonchi  Cardiovascular: S1, S2, RRR  Gastrointestinal: BS+, soft, NT/ND  Extremities: No cyanosis or clubbing. No peripheral edema  :  No patterson.   Skin: No rashes    LABS:      140  |  99  |  54<H>  ----------------------------<  93  4.5   |  21  |  5.4<HH>    Ca    9.4      14 Sep 2018 08:00  Mg     2.0                                 9.5    5.68  )-----------( 179      ( 14 Sep 2018 08:00 )             29.3       Urine Studies:  Urinalysis Basic - ( 07 Sep 2018 16:50 )    Color: Yellow / Appearance: Clear / S.020 / pH:   Gluc:  / Ketone: Negative  / Bili: Negative / Urobili: 0.2 mg/dL   Blood:  / Protein: 100 mg/dL / Nitrite: Negative   Leuk Esterase: Negative / RBC:  / WBC    Sq Epi:  / Non Sq Epi: Occasional /HPF / Bacteria:       Creatinine, Random Urine: 60 mg/dL ( @ 16:54)  Chloride, Random Urine: 61.3 ( @ 16:54)  Sodium, Random Urine: 20.0 mmoL/L ( @ 13:52)  Osmolality, Random Urine: 378 mos/kg ( @ 13:52)  Potassium, Random Urine: 3 mmol/L ( @ 13:52)  Creatinine, Random Urine: 78 mg/dL ( @ 13:52)  Sodium, Random Urine: 69.0 mmoL/L ( @ 13:52)    RADIOLOGY & ADDITIONAL STUDIES:

## 2018-09-14 NOTE — PROGRESS NOTE ADULT - ASSESSMENT
68 years old female pt with past medical hx of type 2 DM, CKD stage 4 baseline crt 2.23 ,hypertension, alzheimer disease depression, delusional disorder, hyperlipidemia transferred to [facility] ER because of fall, when her vitals were checked bp was low 80/50. She has been weak and lethargic for few weeks, has poor appetite.    In ER stroke code was called, but as per neurology no intervention, no acute pathology on ct scan head, received total of 2 L of LR (07 Sep 2018 16:11)    # WILIAM on CKD stage 4 ( WILIAM on CKD vs CKD worsening)  - Serum creatinine since admission has been 4.6 - >4.8 -> 4.5 -> 4.4 -> 5.3> 5.4 today  - creatinine in last 2017 2.23  -creatinine didnot improve with hydration, received NS 75 cc/hr.   - VA duplex pelvic vessels did not show renal artery stenosis  -USG showed   Bilateral renal disease. No hydronephrosis.   Nondiagnostic examination of the urinary bladder.    - Appreciate nephro consult- will begin Ferrous sulphate BID oral  -Cholecalciferol 1000 units oral  - check Vit D 1,25 levels.      # anemia normocytic ( Anemia 2/2 ckd)  - Iron studies ferritin 195, iron 47, iron binding capacity 153, total iron binding capacity 200, percentage saturation 24%.  - Hb has been 9.6 - 8.7 - 8.7 - 8.7 -> 10.2-->9.5  - will begin ferrous sulphate bid     # Hypertension- well controlled  - were held off initially because of low BP.   -will c/w  Nifedipine 90, labetalol 100q12 and hydralazine 75 q8    # type 2 DM   - Not on long term insulin  - check blood glucose AC+HS  - Start insulin if blood glucose is consistently above 180  - last hba1c was 6      # depression and delusion disorder  - on home meds - zyprexa abd celexa    # DVT prophylaxis; Heparin  #  diet- carb consistent with evening snack,  renal diet, with low K  # activity fall precaution  # full code  #dispo- rehab

## 2018-09-14 NOTE — DIETITIAN INITIAL EVALUATION ADULT. - ENERGY NEEDS
calorie 1708-2229kcal (23-30kcal/kg) lean towards lower end for overweight pt.   protein 59-74g (0.8-1.0g/kg CBW) lean towards lower end for CKDIV  fluid- per nephro team

## 2018-09-14 NOTE — PROGRESS NOTE ADULT - ASSESSMENT
68 years old female pt with past medical hx of type 2 DM, CKD stage 4 baseline crt 2.23 ,hypertension, alzheimer disease,  depression, delusional disorder, hyperlipidemia transferred to [facility] ER because of fall and low bp 80/50.     WILIAM on ckd stage 4     cr eber past days to 5.4 eGFR <10  Unclear if WILIAM or progression of CKD, Cr has not improved w/ IVF  I had an extensive conversation w/ pt's son Duncan Johnson ( [phone]). dialysis had never been discussed before, I introduced the idea of HD given that thsi may represent progression of CKD and eGFR now <10. I also discussed at length conservative management including diuretics for fluid overload, K binders for hyperK should they develop. He was  concerned that given her dementia she would liekly have to be sedated to dialyze safely as a she has a tendency to pull on Iv's catheters etc. He does not think this would be an acceptable quality of life that his mom would want, and prefers to take the Conservative management route, and not plan on initating HD should it become indicated.     renal sonogram showed atrophic right kidney      No evidence of significant hemodynamic stenosis noted in bilateral renal  arteries on doppler     pth/ phosphorus noted     Phosphorus  at goal     can start cholecalciferol, check vitamin d 1,25      HTN  better controlled     on nifedipine 60 XL     and hydralazine       anemia likely chronic     hb stable around 8.7     iron studies noted     can start oral ferrous sulphate BID

## 2018-09-14 NOTE — DIETITIAN INITIAL EVALUATION ADULT. - DIET TYPE
~75% PO at meals/renal replacement pts:no protein restr,no conc K & phos, low sodium 50% PO at meals/renal replacement pts:no protein restr,no conc K & phos, low sodium/consistent carbohydrate (no snacks)

## 2018-09-14 NOTE — DIETITIAN INITIAL EVALUATION ADULT. - NS AS NUTRI INTERV MEDICAL AND FOOD SUPPLEMENTS
Commercial beverage/Glucerna BID, Ensure pudding BID- will monitor electrolytes and recommend Nepro if trending up

## 2018-09-15 LAB
ANION GAP SERPL CALC-SCNC: 18 MMOL/L — HIGH (ref 7–14)
BASOPHILS # BLD AUTO: 0.01 K/UL — SIGNIFICANT CHANGE UP (ref 0–0.2)
BASOPHILS NFR BLD AUTO: 0.2 % — SIGNIFICANT CHANGE UP (ref 0–1)
BUN SERPL-MCNC: 53 MG/DL — HIGH (ref 10–20)
CALCIUM SERPL-MCNC: 9.3 MG/DL — SIGNIFICANT CHANGE UP (ref 8.5–10.1)
CHLORIDE SERPL-SCNC: 100 MMOL/L — SIGNIFICANT CHANGE UP (ref 98–110)
CO2 SERPL-SCNC: 21 MMOL/L — SIGNIFICANT CHANGE UP (ref 17–32)
CREAT SERPL-MCNC: 5.7 MG/DL — CRITICAL HIGH (ref 0.7–1.5)
EOSINOPHIL # BLD AUTO: 0.18 K/UL — SIGNIFICANT CHANGE UP (ref 0–0.7)
EOSINOPHIL NFR BLD AUTO: 3.4 % — SIGNIFICANT CHANGE UP (ref 0–8)
GLUCOSE BLDC GLUCOMTR-MCNC: 112 MG/DL — HIGH (ref 70–99)
GLUCOSE BLDC GLUCOMTR-MCNC: 114 MG/DL — HIGH (ref 70–99)
GLUCOSE BLDC GLUCOMTR-MCNC: 152 MG/DL — HIGH (ref 70–99)
GLUCOSE SERPL-MCNC: 105 MG/DL — HIGH (ref 70–99)
HCT VFR BLD CALC: 26 % — LOW (ref 37–47)
HGB BLD-MCNC: 8.4 G/DL — LOW (ref 12–16)
IMM GRANULOCYTES NFR BLD AUTO: 0.2 % — SIGNIFICANT CHANGE UP (ref 0.1–0.3)
LYMPHOCYTES # BLD AUTO: 1.27 K/UL — SIGNIFICANT CHANGE UP (ref 1.2–3.4)
LYMPHOCYTES # BLD AUTO: 24 % — SIGNIFICANT CHANGE UP (ref 20.5–51.1)
MCHC RBC-ENTMCNC: 31.3 PG — HIGH (ref 27–31)
MCHC RBC-ENTMCNC: 32.3 G/DL — SIGNIFICANT CHANGE UP (ref 32–37)
MCV RBC AUTO: 97 FL — SIGNIFICANT CHANGE UP (ref 81–99)
MONOCYTES # BLD AUTO: 0.48 K/UL — SIGNIFICANT CHANGE UP (ref 0.1–0.6)
MONOCYTES NFR BLD AUTO: 9.1 % — SIGNIFICANT CHANGE UP (ref 1.7–9.3)
NEUTROPHILS # BLD AUTO: 3.35 K/UL — SIGNIFICANT CHANGE UP (ref 1.4–6.5)
NEUTROPHILS NFR BLD AUTO: 63.1 % — SIGNIFICANT CHANGE UP (ref 42.2–75.2)
NRBC # BLD: 0 /100 WBCS — SIGNIFICANT CHANGE UP (ref 0–0)
PLATELET # BLD AUTO: 195 K/UL — SIGNIFICANT CHANGE UP (ref 130–400)
POTASSIUM SERPL-MCNC: 4.3 MMOL/L — SIGNIFICANT CHANGE UP (ref 3.5–5)
POTASSIUM SERPL-SCNC: 4.3 MMOL/L — SIGNIFICANT CHANGE UP (ref 3.5–5)
RBC # BLD: 2.68 M/UL — LOW (ref 4.2–5.4)
RBC # FLD: 11.9 % — SIGNIFICANT CHANGE UP (ref 11.5–14.5)
SODIUM SERPL-SCNC: 139 MMOL/L — SIGNIFICANT CHANGE UP (ref 135–146)
WBC # BLD: 5.3 K/UL — SIGNIFICANT CHANGE UP (ref 4.8–10.8)
WBC # FLD AUTO: 5.3 K/UL — SIGNIFICANT CHANGE UP (ref 4.8–10.8)

## 2018-09-15 RX ADMIN — Medication 100 MILLIGRAM(S): at 18:08

## 2018-09-15 RX ADMIN — CITALOPRAM 20 MILLIGRAM(S): 10 TABLET, FILM COATED ORAL at 12:21

## 2018-09-15 RX ADMIN — Medication 1000 UNIT(S): at 12:21

## 2018-09-15 RX ADMIN — Medication 100 MILLIGRAM(S): at 06:36

## 2018-09-15 RX ADMIN — HEPARIN SODIUM 5000 UNIT(S): 5000 INJECTION INTRAVENOUS; SUBCUTANEOUS at 06:35

## 2018-09-15 RX ADMIN — Medication 90 MILLIGRAM(S): at 06:35

## 2018-09-15 RX ADMIN — Medication 325 MILLIGRAM(S): at 06:36

## 2018-09-15 RX ADMIN — OLANZAPINE 10 MILLIGRAM(S): 15 TABLET, FILM COATED ORAL at 12:21

## 2018-09-15 RX ADMIN — Medication 81 MILLIGRAM(S): at 12:21

## 2018-09-15 RX ADMIN — ATORVASTATIN CALCIUM 40 MILLIGRAM(S): 80 TABLET, FILM COATED ORAL at 21:18

## 2018-09-15 RX ADMIN — Medication 75 MILLIGRAM(S): at 06:35

## 2018-09-15 RX ADMIN — Medication 75 MILLIGRAM(S): at 21:18

## 2018-09-15 RX ADMIN — HEPARIN SODIUM 5000 UNIT(S): 5000 INJECTION INTRAVENOUS; SUBCUTANEOUS at 18:08

## 2018-09-15 RX ADMIN — Medication 325 MILLIGRAM(S): at 18:08

## 2018-09-15 RX ADMIN — Medication 75 MILLIGRAM(S): at 13:51

## 2018-09-15 NOTE — PROGRESS NOTE ADULT - ASSESSMENT
68 years old female pt with past medical hx of type 2 DM, CKD stage 4 baseline crt 2.23 ,hypertension, alzheimer disease depression, delusional disorder, hyperlipidemia transferred to [facility] ER because of fall, when her vitals were checked bp was low 80/50. She has been weak and lethargic for few weeks, has poor appetite.    In ER stroke code was called, but as per neurology no intervention, no acute pathology on ct scan head, received total of 2 L of LR (07 Sep 2018 16:11)    # WILIAM on CKD stage 4 ( WILIAM on CKD vs CKD worsening)  - Serum creatinine since admission has been 4.6 - >4.8 -> 4.5 -> 4.4 -> 5.3> 5.4> 5.7 today  - creatinine in last 2017 2.23  -creatinine didnot improve with hydration, received NS 75 cc/hr.   - VA duplex pelvic vessels did not show renal artery stenosis  -USG showed   Bilateral renal disease. No hydronephrosis.   Nondiagnostic examination of the urinary bladder.    - Appreciate nephro consult- c/w Ferrous sulphate BID oral  -Cholecalciferol 1000 units oral  - check Vit D 1,25 levels- pending      # anemia normocytic ( Anemia 2/2 ckd)  - Iron studies ferritin 195, iron 47, iron binding capacity 153, total iron binding capacity 200, percentage saturation 24%.  - Hb has been 9.6 - 8.7 - 8.7 - 8.7 -> 10.2-->9.5> 8.4  - will order FOBT  - c/w ferrous sulphate bid  - type and screen, will transfuse if Hb less than 8 or if signs of active bleeding.     # Hypertension-   - were held off initially because of low BP.   -will c/w  Nifedipine 90, labetalol 100q12 and hydralazine 75 q8    # type 2 DM   - Not on long term insulin  - check blood glucose AC+HS  - Start insulin if blood glucose is consistently above 180  - last hba1c was 6      # depression and delusion disorder  - on home meds - zyprexa abd celexa    # DVT prophylaxis; Heparin  #  diet- carb consistent with evening snack,  renal diet, with low K  # activity fall precaution  # full code  #dispo- rehab

## 2018-09-15 NOTE — PROGRESS NOTE ADULT - SUBJECTIVE AND OBJECTIVE BOX
SUBJECTIVE:    Patient is a 68y old Female who presents with a chief complaint of fall and low BP 80/50 (15 Sep 2018 15:37)    Currently admitted to medicine with the primary diagnosis of Uremic encephalopathy     Today is hospital day 8d. Pt assessed in the evening. Is confused, not oriented.     PAST MEDICAL & SURGICAL HISTORY  Depression, unspecified depression type  Alzheimer disease  Diabetes  HTN (hypertension)  High cholesterol  No significant past surgical history    SOCIAL HISTORY:  Negative for smoking/alcohol/drug use.     ALLERGIES:  No Known Allergies    MEDICATIONS:  STANDING MEDICATIONS  aspirin  chewable 81 milliGRAM(s) Oral daily  atorvastatin 40 milliGRAM(s) Oral at bedtime  cholecalciferol 1000 Unit(s) Oral daily  citalopram 20 milliGRAM(s) Oral daily  ferrous    sulfate 325 milliGRAM(s) Oral two times a day  heparin  Injectable 5000 Unit(s) SubCutaneous every 12 hours  hydrALAZINE 75 milliGRAM(s) Oral every 8 hours  labetalol 100 milliGRAM(s) Oral every 12 hours  NIFEdipine XL 90 milliGRAM(s) Oral daily  OLANZapine 10 milliGRAM(s) Oral daily    PRN MEDICATIONS    VITALS:   T(F): 100.4  HR: 81  BP: 156/93  RR: 18  SpO2: --    LABS:                        8.4    5.30  )-----------( 195      ( 15 Sep 2018 08:07 )             26.0     09-15    139  |  100  |  53<H>  ----------------------------<  105<H>  4.3   |  21  |  5.7<HH>    Ca    9.3      15 Sep 2018 08:07  Mg     2.0     09-14            PHYSICAL EXAM:  GEN: No acute distress  LUNGS: Clear to auscultation bilaterally   HEART: S1/S2 present. RRR.   ABD: Soft, non-tender, non-distended. Bowel sounds present  EXT: NC/NC/NE/2+PP/NEWELL  NEURO: AAOX1, confused, cannot follow commands

## 2018-09-15 NOTE — PROGRESS NOTE ADULT - SUBJECTIVE AND OBJECTIVE BOX
Patient is a 68y old  Female who presents with a chief complaint of fall and low BP 80/50 (14 Sep 2018 15:46)    HPI:  patient is confused was unable to get information from her, history taken from nurse taking care of her in nursing home.  68 years old female pt with past medical hx of type 2 DM, CKD stage 4 baseline crt 2.23 ,hypertension, alzheimer disease depression, delusional disorder, hyperlipidemia transferred to [facility] ER because of fall this morning, when her vitals were checked bp was low 80/50. She has been weak and lethargic for few weeks, has poor appetite.  she denies any vomiting, diarrhoea or abdominal pain, no change in her urinary habit, no fever.  she is always confused baseline, so no change in her mental change was noticed.  no weight change recently.  she denies any cough, chest pain, no SOB, has noticed increased tremors .  In ER stroke code was called, but as per neurology no intervention, no acute pathology on ct scan head, received total of 2 L of LR (07 Sep 2018 16:11)    PAST MEDICAL & SURGICAL HISTORY:  Depression, unspecified depression type  Alzheimer disease  Diabetes  HTN (hypertension)  High cholesterol  No significant past surgical history    patient seen and examined independently on morning rounds, chart reviewed and discussed with the medicine resident.    no overnight events---no plan for RRT at this time (renal team d/w son)-       Vital Signs Last 24 Hrs  T(C): 36.8 (15 Sep 2018 13:05), Max: 36.8 (15 Sep 2018 13:05)  T(F): 98.3 (15 Sep 2018 13:05), Max: 98.3 (15 Sep 2018 13:05)  HR: 81 (15 Sep 2018 13:05) (78 - 84)  BP: 124/59 (15 Sep 2018 13:05) (103/70 - 147/59)  BP(mean): --  RR: 18 (15 Sep 2018 13:05) (16 - 18)  SpO2: --             PE:  GEN-NAD, confused, lethargic, AAOx0  NECK- supple no jvd, no lymphadenopathy  PULM- fair air entry with decreased bs bilateral bases  CVS- +s1/s2 RRR  GI- soft NT obese ND +bs,   EXT- no edema               8.4    5.30  )-----------( 195      ( 15 Sep 2018 08:07 )             26.0     09-15    139  |  100  |  53<H>  ----------------------------<  105<H>  4.3   |  21  |  5.7<HH>    Ca    9.3      15 Sep 2018 08:07  Mg     2.0     09-14                MEDICATIONS  (STANDING):  aspirin  chewable 81 milliGRAM(s) Oral daily  atorvastatin 40 milliGRAM(s) Oral at bedtime  cholecalciferol 1000 Unit(s) Oral daily  citalopram 20 milliGRAM(s) Oral daily  ferrous    sulfate 325 milliGRAM(s) Oral two times a day  heparin  Injectable 5000 Unit(s) SubCutaneous every 12 hours  hydrALAZINE 75 milliGRAM(s) Oral every 8 hours  labetalol 100 milliGRAM(s) Oral every 12 hours  NIFEdipine XL 90 milliGRAM(s) Oral daily  OLANZapine 10 milliGRAM(s) Oral daily

## 2018-09-15 NOTE — PROGRESS NOTE ADULT - ASSESSMENT
69 yo woman with alzheimers dementia, htn, CKD stage 4 (baseline cr 2) dm who p/w fall, weakness and hypotension initially admitted for WILIAM on CKD stage 4    #WILIAM on CKD stage 4  -continue to monitor bmp (cr up to 5.7 today) and with worsening uremia and declining mental status  -no improvement after IVF  -appreciate renal recommendations  -as per family do not want to pursue RRT 2/2 poor functional status and worsening dementia  -renal us shows chronic bilateral renal disease with no hydronephrosis  -cont to monitor cbc closely---suppl iron  -bp better controlled now on current regimen---adjust doses as needed    #anemia- mixed (Anemia of chronic disease with iron def)  -monitor cbc--today hb 8.4  -transfuse if hb <8 or signs of active bleeding  -start iron suppl bid  -check FOBT     #DVT/GI ppx    FULL CODE--need to continue to readdress GOC with son.

## 2018-09-16 LAB
ANION GAP SERPL CALC-SCNC: 16 MMOL/L — HIGH (ref 7–14)
BASOPHILS # BLD AUTO: 0.01 K/UL — SIGNIFICANT CHANGE UP (ref 0–0.2)
BASOPHILS NFR BLD AUTO: 0.2 % — SIGNIFICANT CHANGE UP (ref 0–1)
BLD GP AB SCN SERPL QL: SIGNIFICANT CHANGE UP
BUN SERPL-MCNC: 55 MG/DL — HIGH (ref 10–20)
CALCIUM SERPL-MCNC: 9.6 MG/DL — SIGNIFICANT CHANGE UP (ref 8.5–10.1)
CHLORIDE SERPL-SCNC: 101 MMOL/L — SIGNIFICANT CHANGE UP (ref 98–110)
CO2 SERPL-SCNC: 23 MMOL/L — SIGNIFICANT CHANGE UP (ref 17–32)
CREAT SERPL-MCNC: 5.7 MG/DL — CRITICAL HIGH (ref 0.7–1.5)
EOSINOPHIL # BLD AUTO: 0.11 K/UL — SIGNIFICANT CHANGE UP (ref 0–0.7)
EOSINOPHIL NFR BLD AUTO: 1.8 % — SIGNIFICANT CHANGE UP (ref 0–8)
GLUCOSE BLDC GLUCOMTR-MCNC: 111 MG/DL — HIGH (ref 70–99)
GLUCOSE BLDC GLUCOMTR-MCNC: 97 MG/DL — SIGNIFICANT CHANGE UP (ref 70–99)
GLUCOSE SERPL-MCNC: 129 MG/DL — HIGH (ref 70–99)
HCT VFR BLD CALC: 27.4 % — LOW (ref 37–47)
HGB BLD-MCNC: 8.8 G/DL — LOW (ref 12–16)
IMM GRANULOCYTES NFR BLD AUTO: 0.2 % — SIGNIFICANT CHANGE UP (ref 0.1–0.3)
LYMPHOCYTES # BLD AUTO: 1.03 K/UL — LOW (ref 1.2–3.4)
LYMPHOCYTES # BLD AUTO: 16.9 % — LOW (ref 20.5–51.1)
MCHC RBC-ENTMCNC: 31.1 PG — HIGH (ref 27–31)
MCHC RBC-ENTMCNC: 32.1 G/DL — SIGNIFICANT CHANGE UP (ref 32–37)
MCV RBC AUTO: 96.8 FL — SIGNIFICANT CHANGE UP (ref 81–99)
MONOCYTES # BLD AUTO: 0.6 K/UL — SIGNIFICANT CHANGE UP (ref 0.1–0.6)
MONOCYTES NFR BLD AUTO: 9.8 % — HIGH (ref 1.7–9.3)
NEUTROPHILS # BLD AUTO: 4.34 K/UL — SIGNIFICANT CHANGE UP (ref 1.4–6.5)
NEUTROPHILS NFR BLD AUTO: 71.1 % — SIGNIFICANT CHANGE UP (ref 42.2–75.2)
NRBC # BLD: 0 /100 WBCS — SIGNIFICANT CHANGE UP (ref 0–0)
PLATELET # BLD AUTO: 221 K/UL — SIGNIFICANT CHANGE UP (ref 130–400)
POTASSIUM SERPL-MCNC: 4.4 MMOL/L — SIGNIFICANT CHANGE UP (ref 3.5–5)
POTASSIUM SERPL-SCNC: 4.4 MMOL/L — SIGNIFICANT CHANGE UP (ref 3.5–5)
RBC # BLD: 2.83 M/UL — LOW (ref 4.2–5.4)
RBC # FLD: 11.9 % — SIGNIFICANT CHANGE UP (ref 11.5–14.5)
SODIUM SERPL-SCNC: 140 MMOL/L — SIGNIFICANT CHANGE UP (ref 135–146)
TYPE + AB SCN PNL BLD: SIGNIFICANT CHANGE UP
VIT D25+D1,25 OH+D1,25 PNL SERPL-MCNC: 15.7 PG/ML — LOW (ref 19.9–79.3)
WBC # BLD: 6.1 K/UL — SIGNIFICANT CHANGE UP (ref 4.8–10.8)
WBC # FLD AUTO: 6.1 K/UL — SIGNIFICANT CHANGE UP (ref 4.8–10.8)

## 2018-09-16 RX ADMIN — Medication 90 MILLIGRAM(S): at 05:21

## 2018-09-16 RX ADMIN — Medication 75 MILLIGRAM(S): at 21:21

## 2018-09-16 RX ADMIN — Medication 325 MILLIGRAM(S): at 05:20

## 2018-09-16 RX ADMIN — OLANZAPINE 10 MILLIGRAM(S): 15 TABLET, FILM COATED ORAL at 11:06

## 2018-09-16 RX ADMIN — Medication 75 MILLIGRAM(S): at 13:26

## 2018-09-16 RX ADMIN — HEPARIN SODIUM 5000 UNIT(S): 5000 INJECTION INTRAVENOUS; SUBCUTANEOUS at 18:28

## 2018-09-16 RX ADMIN — Medication 81 MILLIGRAM(S): at 11:05

## 2018-09-16 RX ADMIN — Medication 1000 UNIT(S): at 11:06

## 2018-09-16 RX ADMIN — Medication 100 MILLIGRAM(S): at 05:22

## 2018-09-16 RX ADMIN — Medication 325 MILLIGRAM(S): at 18:28

## 2018-09-16 RX ADMIN — HEPARIN SODIUM 5000 UNIT(S): 5000 INJECTION INTRAVENOUS; SUBCUTANEOUS at 05:20

## 2018-09-16 RX ADMIN — CITALOPRAM 20 MILLIGRAM(S): 10 TABLET, FILM COATED ORAL at 11:06

## 2018-09-16 RX ADMIN — Medication 75 MILLIGRAM(S): at 05:21

## 2018-09-16 RX ADMIN — Medication 100 MILLIGRAM(S): at 18:28

## 2018-09-16 RX ADMIN — ATORVASTATIN CALCIUM 40 MILLIGRAM(S): 80 TABLET, FILM COATED ORAL at 21:21

## 2018-09-16 NOTE — PROGRESS NOTE ADULT - SUBJECTIVE AND OBJECTIVE BOX
Patient is a 68y old  Female who presents with a chief complaint of fall and found to have low BP 80/50 (14 Sep 2018 15:46)    HPI:  patient is confused was unable to get information from her, history taken from nurse taking care of her in nursing home.  68 years old female pt with past medical hx of type 2 DM, CKD stage 4 baseline crt 2.23 ,hypertension, alzheimer disease depression, delusional disorder, hyperlipidemia transferred to [facility] ER because of fall this morning, when her vitals were checked bp was low 80/50. She has been weak and lethargic for few weeks, has poor appetite.  she denies any vomiting, diarrhoea or abdominal pain, no change in her urinary habit, no fever.  she is always confused baseline, so no change in her mental change was noticed.  no weight change recently.  she denies any cough, chest pain, no SOB, has noticed increased tremors .  In ER stroke code was called, but as per neurology no intervention, no acute pathology on ct scan head, received total of 2 L of LR (07 Sep 2018 16:11)    PAST MEDICAL & SURGICAL HISTORY:  Depression, unspecified depression type  Alzheimer disease  Diabetes  HTN (hypertension)  High cholesterol  No significant past surgical history    patient seen and examined independently on morning rounds, chart reviewed and discussed with the medicine resident.    no overnight events---no plan for RRT at this time (renal team d/w son)-     Vital Signs Last 24 Hrs  T(C): 37.1 (16 Sep 2018 05:51), Max: 38 (15 Sep 2018 21:00)  T(F): 98.7 (16 Sep 2018 05:51), Max: 100.4 (15 Sep 2018 21:00)  HR: 80 (16 Sep 2018 09:49) (80 - 95)  BP: 135/73 (16 Sep 2018 09:49) (124/59 - 177/81)  BP(mean): --  RR: 18 (16 Sep 2018 05:51) (18 - 18)  SpO2: 99% (16 Sep 2018 09:49) (99% - 99%)             PE:  GEN-NAD, confused, lethargic, AAOx0  NECK- supple no jvd, no lymphadenopathy  PULM- fair air entry with decreased bs bilateral bases  CVS- +s1/s2 RRR  GI- soft NT obese ND +bs,   EXT- no edema                          8.8    6.10  )-----------( 221      ( 16 Sep 2018 10:13 )             27.4   09-15    139  |  100  |  53<H>  ----------------------------<  105<H>  4.3   |  21  |  5.7<HH>    Ca    9.3      15 Sep 2018 08:07    MEDICATIONS  (STANDING):  aspirin  chewable 81 milliGRAM(s) Oral daily  atorvastatin 40 milliGRAM(s) Oral at bedtime  cholecalciferol 1000 Unit(s) Oral daily  citalopram 20 milliGRAM(s) Oral daily  ferrous    sulfate 325 milliGRAM(s) Oral two times a day  heparin  Injectable 5000 Unit(s) SubCutaneous every 12 hours  hydrALAZINE 75 milliGRAM(s) Oral every 8 hours  labetalol 100 milliGRAM(s) Oral every 12 hours  NIFEdipine XL 90 milliGRAM(s) Oral daily  OLANZapine 10 milliGRAM(s) Oral daily

## 2018-09-16 NOTE — PROGRESS NOTE ADULT - ASSESSMENT
69 yo woman with alzheimers dementia, htn, CKD stage 4 (baseline cr 2) dm who p/w fall, weakness and hypotension initially admitted for WILIAM on CKD stage 4    #WILIAM on CKD stage 4  -continue to monitor bmp  and with worsening uremia and declining mental status  -no improvement after IVF  -appreciate renal recommendations  -as per family do not want to pursue RRT 2/2 poor functional status and worsening dementia  -renal us shows chronic bilateral renal disease with no hydronephrosis  -cont to monitor cbc closely---suppl iron  -bp better controlled now on current regimen---adjust doses as needed    #anemia- mixed (Anemia of chronic disease with iron def)  -monitor cbc--  -transfuse if hb <8 or signs of active bleeding  -start iron suppl bid  -check FOBT     #DVT/GI ppx    FULL CODE--need to continue to readdress GOC with son.    palliative care or hospice needs to see pt : renal failure, dementia, no RRT

## 2018-09-16 NOTE — PROGRESS NOTE ADULT - SUBJECTIVE AND OBJECTIVE BOX
[facility] FOLLOW UP NOTE  --------------------------------------------------------------------------------  Chief Complaint: WILIAM on CKD    24 hour events/subjective:        PAST HISTORY  --------------------------------------------------------------------------------  No significant changes to PMH, PSH, FHx, SHx, unless otherwise noted    ALLERGIES & MEDICATIONS  --------------------------------------------------------------------------------  Allergies    No Known Allergies    Intolerances      Standing Inpatient Medications  aspirin  chewable 81 milliGRAM(s) Oral daily  atorvastatin 40 milliGRAM(s) Oral at bedtime  cholecalciferol 1000 Unit(s) Oral daily  citalopram 20 milliGRAM(s) Oral daily  ferrous    sulfate 325 milliGRAM(s) Oral two times a day  heparin  Injectable 5000 Unit(s) SubCutaneous every 12 hours  hydrALAZINE 75 milliGRAM(s) Oral every 8 hours  labetalol 100 milliGRAM(s) Oral every 12 hours  NIFEdipine XL 90 milliGRAM(s) Oral daily  OLANZapine 10 milliGRAM(s) Oral daily    PRN Inpatient Medications      REVIEW OF SYSTEMS  --------------------------------------------------------------------------------  Gen: No weight changes, fatigue, fevers/chills, weakness  Skin: No rashes  Head/Eyes/Ears/Mouth: No headache; Normal hearing; Normal vision w/o blurriness; No sinus pain/discomfort, sore throat  Respiratory: No dyspnea, cough, wheezing, hemoptysis  CV: No chest pain, PND, orthopnea  GI: No abdominal pain, diarrhea, constipation, nausea, vomiting, melena, hematochezia  : No increased frequency, dysuria, hematuria, nocturia  MSK: No joint pain/swelling; no back pain; no edema  Neuro: No dizziness/lightheadedness, weakness, seizures, numbness, tingling  Heme: No easy bruising or bleeding  Endo: No heat/cold intolerance  Psych: No significant nervousness, anxiety, stress, depression    All other systems were reviewed and are negative, except as noted.    VITALS/PHYSICAL EXAM  --------------------------------------------------------------------------------  T(C): 36.7 (09-16-18 @ 14:13), Max: 38 (09-15-18 @ 21:00)  HR: 83 (09-16-18 @ 14:13) (80 - 95)  BP: 116/71 (09-16-18 @ 14:13) (116/71 - 177/81)  RR: 18 (09-16-18 @ 14:13) (18 - 18)  SpO2: 99% (09-16-18 @ 09:49) (99% - 99%)  Wt(kg): --        Physical Exam:  	Gen: NAD, well-appearing  	HEENT: PERRL, supple neck, clear oropharynx  	Pulm: CTA B/L  	CV: RRR, S1S2; no rub  	Back: No spinal or CVA tenderness; no sacral edema  	Abd: +BS, soft, nontender/nondistended  	: No suprapubic tenderness  	UE: Warm, FROM, no clubbing, intact strength; no edema; no asterixis  	LE: Warm, FROM, no clubbing, intact strength; no edema  	Neuro: No focal deficits, intact gait  	Psych: Normal affect and mood  	Skin: Warm, without rashes  	Vascular access:    LABS/STUDIES  --------------------------------------------------------------------------------              8.8    6.10  >-----------<  221      [09-16-18 @ 10:13]              27.4     140  |  101  |  55  ----------------------------<  129      [09-16-18 @ 10:13]  4.4   |  23  |  5.7        Ca     9.6     [09-16-18 @ 10:13]            Creatinine Trend:  SCr 5.7 [09-16 @ 10:13]  SCr 5.7 [09-15 @ 08:07]  SCr 5.4 [09-14 @ 08:00]  SCr 5.3 [09-13 @ 11:16]  SCr 4.4 [09-11 @ 06:46]    Urinalysis - [09-07-18 @ 16:50]      Color Yellow / Appearance Clear / SG 1.020 / pH 6.0      Gluc Negative / Ketone Negative  / Bili Negative / Urobili 0.2       Blood Negative / Protein 100 / Leuk Est Negative / Nitrite Negative      RBC  / WBC  / Hyaline  / Gran  / Sq Epi  / Non Sq Epi Occasional / Bacteria     Urine Creatinine 60      [09-11-18 @ 16:54]  Urine Chloride 61.3      [09-11-18 @ 16:54]    Iron 47, TIBC 200, %sat 24      [09-08-18 @ 08:49]  Ferritin 195      [09-08-18 @ 08:49]  PTH -- (Ca 9.9)      [09-08-18 @ 08:49]   281  TSH 1.64      [09-08-18 @ 08:49]  Lipid: chol 188, , HDL 80, LDL 86      [09-08-18 @ 08:49]

## 2018-09-16 NOTE — PROGRESS NOTE ADULT - ASSESSMENT
Patient presents with WILIAM on CKD, no improvement at this point, most recent creatinine is 5.7, strong possibility of progressing CKD. Patient needs further education for  possible need of dialysis. Last potassium was 4.4 , patient somewhat lethargic at time of exam. BP was 116/71. labs and meds reviewed.

## 2018-09-17 LAB
ANION GAP SERPL CALC-SCNC: 15 MMOL/L — HIGH (ref 7–14)
BUN SERPL-MCNC: 59 MG/DL — HIGH (ref 10–20)
CALCIUM SERPL-MCNC: 9.7 MG/DL — SIGNIFICANT CHANGE UP (ref 8.5–10.1)
CHLORIDE SERPL-SCNC: 98 MMOL/L — SIGNIFICANT CHANGE UP (ref 98–110)
CO2 SERPL-SCNC: 23 MMOL/L — SIGNIFICANT CHANGE UP (ref 17–32)
CREAT SERPL-MCNC: 6.1 MG/DL — CRITICAL HIGH (ref 0.7–1.5)
GLUCOSE BLDC GLUCOMTR-MCNC: 106 MG/DL — HIGH (ref 70–99)
GLUCOSE BLDC GLUCOMTR-MCNC: 106 MG/DL — HIGH (ref 70–99)
GLUCOSE SERPL-MCNC: 143 MG/DL — HIGH (ref 70–99)
HCT VFR BLD CALC: 26.8 % — LOW (ref 37–47)
HGB BLD-MCNC: 8.5 G/DL — LOW (ref 12–16)
MCHC RBC-ENTMCNC: 31.1 PG — HIGH (ref 27–31)
MCHC RBC-ENTMCNC: 31.7 G/DL — LOW (ref 32–37)
MCV RBC AUTO: 98.2 FL — SIGNIFICANT CHANGE UP (ref 81–99)
NRBC # BLD: 0 /100 WBCS — SIGNIFICANT CHANGE UP (ref 0–0)
PLATELET # BLD AUTO: 239 K/UL — SIGNIFICANT CHANGE UP (ref 130–400)
POTASSIUM SERPL-MCNC: 4.6 MMOL/L — SIGNIFICANT CHANGE UP (ref 3.5–5)
POTASSIUM SERPL-SCNC: 4.6 MMOL/L — SIGNIFICANT CHANGE UP (ref 3.5–5)
RBC # BLD: 2.73 M/UL — LOW (ref 4.2–5.4)
RBC # FLD: 12 % — SIGNIFICANT CHANGE UP (ref 11.5–14.5)
SODIUM SERPL-SCNC: 136 MMOL/L — SIGNIFICANT CHANGE UP (ref 135–146)
WBC # BLD: 6.06 K/UL — SIGNIFICANT CHANGE UP (ref 4.8–10.8)
WBC # FLD AUTO: 6.06 K/UL — SIGNIFICANT CHANGE UP (ref 4.8–10.8)

## 2018-09-17 RX ADMIN — Medication 75 MILLIGRAM(S): at 23:01

## 2018-09-17 RX ADMIN — Medication 1000 UNIT(S): at 11:20

## 2018-09-17 RX ADMIN — Medication 325 MILLIGRAM(S): at 05:08

## 2018-09-17 RX ADMIN — HEPARIN SODIUM 5000 UNIT(S): 5000 INJECTION INTRAVENOUS; SUBCUTANEOUS at 17:08

## 2018-09-17 RX ADMIN — CITALOPRAM 20 MILLIGRAM(S): 10 TABLET, FILM COATED ORAL at 11:20

## 2018-09-17 RX ADMIN — Medication 100 MILLIGRAM(S): at 05:10

## 2018-09-17 RX ADMIN — Medication 100 MILLIGRAM(S): at 17:09

## 2018-09-17 RX ADMIN — Medication 90 MILLIGRAM(S): at 05:08

## 2018-09-17 RX ADMIN — ATORVASTATIN CALCIUM 40 MILLIGRAM(S): 80 TABLET, FILM COATED ORAL at 23:01

## 2018-09-17 RX ADMIN — Medication 75 MILLIGRAM(S): at 05:09

## 2018-09-17 RX ADMIN — Medication 81 MILLIGRAM(S): at 11:20

## 2018-09-17 RX ADMIN — HEPARIN SODIUM 5000 UNIT(S): 5000 INJECTION INTRAVENOUS; SUBCUTANEOUS at 05:10

## 2018-09-17 RX ADMIN — Medication 75 MILLIGRAM(S): at 17:09

## 2018-09-17 RX ADMIN — Medication 325 MILLIGRAM(S): at 17:09

## 2018-09-17 RX ADMIN — OLANZAPINE 10 MILLIGRAM(S): 15 TABLET, FILM COATED ORAL at 11:20

## 2018-09-17 NOTE — PROGRESS NOTE ADULT - ASSESSMENT
68 years old female pt with past medical hx of type 2 DM, CKD stage 4 baseline crt 2.23 ,hypertension, alzheimer disease depression, delusional disorder, hyperlipidemia transferred to [facility] ER because of fall, when her vitals were checked bp was low 80/50. She has been weak and lethargic for few weeks, has poor appetite.    In ER stroke code was called, but as per neurology no intervention, no acute pathology on ct scan head, received total of 2 L of LR (07 Sep 2018 16:11)    # WILIAM on CKD stage 4 ( WILIAM on CKD vs CKD worsening)  - Serum creatinine since admission has been 4.6 - >4.8 -> 4.5 -> 4.4 -> 5.3> 5.4> 5.7> 6.1 today  - creatinine in last 2017 2.23  -creatinine didnot improve with hydration, received NS 75 cc/hr.   - VA duplex pelvic vessels did not show renal artery stenosis  -USG showed   Bilateral renal disease. No hydronephrosis.   Nondiagnostic examination of the urinary bladder.  -As per Nephro, no RRT after discussion with family.  - Appreciate nephro consult- c/w Ferrous sulphate BID oral  -Cholecalciferol 1000 units oral  -  Vit D 1,25 levels- 15.7      # anemia normocytic ( Anemia 2/2 ckd)  - Iron studies ferritin 195, iron 47, iron binding capacity 153, total iron binding capacity 200, percentage saturation 24%.  - Hb has been 9.6 - 8.7 - 8.7 - 8.7 -> 10.2-->9.5> 8.4> 8.5  - FOBT- pending   - c/w ferrous sulphate bid  - type and screen, will transfuse if Hb less than 8 or if signs of active bleeding.  - transfuse if hb less than 8  -f/u am CBC    # Hypertension-   - were held off initially because of low BP.   -will c/w  Nifedipine 90, labetalol 100q12 and hydralazine 75 q8    # type 2 DM   - Not on long term insulin  - check blood glucose AC+HS  - Start insulin if blood glucose is consistently above 180  - last hba1c was 6      # depression and delusion disorder  - on home meds - zyprexa abd celexa    # DVT prophylaxis; Heparin  #  diet- carb consistent with evening snack,  renal diet, with low K  # activity fall precaution  # full code  #dispo- rehab 68 years old female pt with past medical hx of type 2 DM, CKD stage 4 baseline crt 2.23 ,hypertension, alzheimer disease depression, delusional disorder, hyperlipidemia transferred to [facility] ER because of fall, when her vitals were checked bp was low 80/50. She has been weak and lethargic for few weeks, has poor appetite.    In ER stroke code was called, but as per neurology no intervention, no acute pathology on ct scan head, received total of 2 L of LR (07 Sep 2018 16:11)    # WILIAM on CKD stage 4 ( WILIAM on CKD vs CKD worsening)  - Serum creatinine since admission has been 4.6 - >4.8 -> 4.5 -> 4.4 -> 5.3> 5.4> 5.7> 6.1 today  - creatinine in last 2017 2.23  -creatinine didnot improve with hydration, received NS 75 cc/hr.   - VA duplex pelvic vessels did not show renal artery stenosis  -USG showed   Bilateral renal disease. No hydronephrosis.   Nondiagnostic examination of the urinary bladder.  -As per Nephro, no RRT after discussion with family.  - Appreciate nephro consult- c/w Ferrous sulphate BID oral  -Cholecalciferol 1000 units oral  -  Vit D 1,25 levels- 15.7      # anemia normocytic ( Anemia 2/2 ckd)  - Iron studies ferritin 195, iron 47, iron binding capacity 153, total iron binding capacity 200, percentage saturation 24%.  - Hb has been 9.6 - 8.7 - 8.7 - 8.7 -> 10.2-->9.5> 8.4> 8.5  - FOBT- pending   - c/w ferrous sulphate bid  - type and screen, will transfuse if Hb less than 7 or if signs of active bleeding.  - transfuse if hb less than 7  -f/u am CBC    # Hypertension-   - were held off initially because of low BP.   -will c/w  Nifedipine 90, labetalol 100q12 and hydralazine 75 q8    # type 2 DM   - Not on long term insulin  - check blood glucose AC+HS  - Start insulin if blood glucose is consistently above 180  - last hba1c was 6      # depression and delusion disorder  - on home meds - zyprexa abd celexa    # DVT prophylaxis; Heparin  #  diet- carb consistent with evening snack,  renal diet, with low K  # activity fall precaution  # full code  #dispo- rehab

## 2018-09-17 NOTE — PROGRESS NOTE ADULT - SUBJECTIVE AND OBJECTIVE BOX
SUBJECTIVE:    Patient is a 68y old Female who presents with a chief complaint of fall and low BP 80/50 (17 Sep 2018 12:15)    Currently admitted to medicine with the primary diagnosis of Uremic encephalopathy     Today is hospital day 10d. This morning she is resting comfortably in bed and reports no new issues or overnight events. Pt is oriented more than before. AAOx1-2, answers appropriately for some questions.     PAST MEDICAL & SURGICAL HISTORY  Depression, unspecified depression type  Alzheimer disease  Diabetes  HTN (hypertension)  High cholesterol  No significant past surgical history    SOCIAL HISTORY:  Negative for smoking/alcohol/drug use.     ALLERGIES:  No Known Allergies    MEDICATIONS:  STANDING MEDICATIONS  aspirin  chewable 81 milliGRAM(s) Oral daily  atorvastatin 40 milliGRAM(s) Oral at bedtime  cholecalciferol 1000 Unit(s) Oral daily  citalopram 20 milliGRAM(s) Oral daily  ferrous    sulfate 325 milliGRAM(s) Oral two times a day  heparin  Injectable 5000 Unit(s) SubCutaneous every 12 hours  hydrALAZINE 75 milliGRAM(s) Oral every 8 hours  labetalol 100 milliGRAM(s) Oral every 12 hours  NIFEdipine XL 90 milliGRAM(s) Oral daily  OLANZapine 10 milliGRAM(s) Oral daily    PRN MEDICATIONS    VITALS:   T(F): 97.6  HR: 76  BP: 129/84  RR: 18  SpO2: --    LABS:                        8.5    6.06  )-----------( 239      ( 17 Sep 2018 10:17 )             26.8     09-17    136  |  98  |  59<H>  ----------------------------<  143<H>  4.6   |  23  |  6.1<HH>    Ca    9.7      17 Sep 2018 10:17            PHYSICAL EXAM:  GEN: No acute distress  LUNGS: Clear to auscultation bilaterally   HEART: S1/S2 present. RRR.   ABD: Soft, non-tender, non-distended. Bowel sounds present  EXT: NC/NC/NE/2+PP/NEWELL  NEURO: AAOX1.

## 2018-09-17 NOTE — PROGRESS NOTE ADULT - SUBJECTIVE AND OBJECTIVE BOX
Nephrology progress note    Patient is seen and examined, events over the last 24 h noted .    Allergies:  No Known Allergies    Hospital Medications:   MEDICATIONS  (STANDING):  aspirin  chewable 81 milliGRAM(s) Oral daily  atorvastatin 40 milliGRAM(s) Oral at bedtime  cholecalciferol 1000 Unit(s) Oral daily  citalopram 20 milliGRAM(s) Oral daily  ferrous    sulfate 325 milliGRAM(s) Oral two times a day  heparin  Injectable 5000 Unit(s) SubCutaneous every 12 hours  hydrALAZINE 75 milliGRAM(s) Oral every 8 hours  labetalol 100 milliGRAM(s) Oral every 12 hours  NIFEdipine XL 90 milliGRAM(s) Oral daily  OLANZapine 10 milliGRAM(s) Oral daily        VITALS:  T(F): 97.1 (09-17-18 @ 04:50), Max: 99.6 (09-16-18 @ 21:04)  HR: 77 (09-17-18 @ 04:50)  BP: 133/67 (09-17-18 @ 04:50)  RR: 18 (09-17-18 @ 04:50)  SpO2: --  Wt(kg): --        PHYSICAL EXAM:  Constitutional: NAD  HEENT: anicteric sclera, oropharynx clear, MMM  Neck: No JVD  Respiratory: CTAB, no wheezes, rales or rhonchi  Cardiovascular: S1, S2, RRR  Gastrointestinal: BS+, soft, NT/ND  Extremities: No cyanosis or clubbing. No peripheral edema  :  No patterson.   Skin: No rashes    LABS:  09-16    140  |  101  |  55<H>  ----------------------------<  129<H>  4.4   |  23  |  5.7<HH>    Ca    9.6      16 Sep 2018 10:13                            8.5    6.06  )-----------( 239      ( 17 Sep 2018 10:17 )             26.8       Urine Studies:    Creatinine, Random Urine: 60 mg/dL (09-11 @ 16:54)  Chloride, Random Urine: 61.3 (09-11 @ 16:54)    RADIOLOGY & ADDITIONAL STUDIES: Nephrology progress note    Patient is seen and examined, events over the last 24 h noted .  No major events  sitting in a chair confused     Allergies:  No Known Allergies    Hospital Medications:   MEDICATIONS  (STANDING):  aspirin  chewable 81 milliGRAM(s) Oral daily  atorvastatin 40 milliGRAM(s) Oral at bedtime  cholecalciferol 1000 Unit(s) Oral daily  citalopram 20 milliGRAM(s) Oral daily  ferrous    sulfate 325 milliGRAM(s) Oral two times a day  heparin  Injectable 5000 Unit(s) SubCutaneous every 12 hours  hydrALAZINE 75 milliGRAM(s) Oral every 8 hours  labetalol 100 milliGRAM(s) Oral every 12 hours  NIFEdipine XL 90 milliGRAM(s) Oral daily  OLANZapine 10 milliGRAM(s) Oral daily        VITALS:  T(F): 97.1 (09-17-18 @ 04:50), Max: 99.6 (09-16-18 @ 21:04)  HR: 77 (09-17-18 @ 04:50)  BP: 133/67 (09-17-18 @ 04:50)  RR: 18 (09-17-18 @ 04:50)          PHYSICAL EXAM:  Constitutional: NAD  HEENT: anicteric sclera, oropharynx clear, MMM  Neck: No JVD  Respiratory: CTAB, no wheezes, rales or rhonchi  Cardiovascular: S1, S2, RRR  Gastrointestinal: BS+, soft, NT/ND  Extremities: No cyanosis or clubbing. No peripheral edema  :  No patterson.   Skin: No rashes    LABS:  09-16    140  |  101  |  55<H>  ----------------------------<  129<H>  4.4   |  23  |  5.7<HH>    Ca    9.6      16 Sep 2018 10:13                            8.5    6.06  )-----------( 239      ( 17 Sep 2018 10:17 )             26.8       Urine Studies:    Creatinine, Random Urine: 60 mg/dL (09-11 @ 16:54)  Chloride, Random Urine: 61.3 (09-11 @ 16:54)    RADIOLOGY & ADDITIONAL STUDIES:

## 2018-09-17 NOTE — PROGRESS NOTE ADULT - ASSESSMENT
68 years old female pt with past medical hx of type 2 DM, CKD stage 4 baseline crt 2.23 ,hypertension, alzheimer disease,  depression, delusional disorder, hyperlipidemia transferred to [facility] ER because of fall and low bp 80/50.     WILIAM on ckd stage 4     cr eber past days to 5.4 eGFR <10  Unclear if WILIAM or progression of CKD, Cr has not improved w/ IVF  I had an extensive conversation w/ pt's son Duncan Johnson ( [phone]). dialysis had never been discussed before, I introduced the idea of HD given that thsi may represent progression of CKD and eGFR now <10. I also discussed at length conservative management including diuretics for fluid overload, K binders for hyperK should they develop. He was  concerned that given her dementia she would liekly have to be sedated to dialyze safely as a she has a tendency to pull on Iv's catheters etc. He does not think this would be an acceptable quality of life that his mom would want, and prefers to take the Conservative management route, and not plan on initating HD should it become indicated.     renal sonogram showed atrophic right kidney      No evidence of significant hemodynamic stenosis noted in bilateral renal  arteries on doppler     pth/ phosphorus noted     Phosphorus  at goal     can start cholecalciferol, check vitamin d 1,25      HTN  better controlled     on nifedipine 60 XL     and hydralazine       anemia likely chronic     hb stable around 8.7     iron studies noted     can start oral ferrous sulphate BID 68 years old female pt with past medical hx of type 2 DM, CKD stage 4 baseline crt 2.23 ,hypertension, alzheimer disease,  depression, delusional disorder, hyperlipidemia transferred to [facility] ER because of fall and low bp 80/50.     WILIAM on ckd stage 4  Creatinine is worsening  as per our discussion with family no RRT   pth/ phosphorus noted   Phosphorus  at goal  on Vitamin D       HTN  better controlled     on nifedipine 60 XL     and hydralazine       anemia likely chronic  Transfuse if Hb<7

## 2018-09-18 LAB
ANION GAP SERPL CALC-SCNC: 20 MMOL/L — HIGH (ref 7–14)
BASOPHILS # BLD AUTO: 0.02 K/UL — SIGNIFICANT CHANGE UP (ref 0–0.2)
BASOPHILS NFR BLD AUTO: 0.4 % — SIGNIFICANT CHANGE UP (ref 0–1)
BUN SERPL-MCNC: 62 MG/DL — CRITICAL HIGH (ref 10–20)
CALCIUM SERPL-MCNC: 9.5 MG/DL — SIGNIFICANT CHANGE UP (ref 8.5–10.1)
CHLORIDE SERPL-SCNC: 96 MMOL/L — LOW (ref 98–110)
CO2 SERPL-SCNC: 18 MMOL/L — SIGNIFICANT CHANGE UP (ref 17–32)
CREAT SERPL-MCNC: 6 MG/DL — CRITICAL HIGH (ref 0.7–1.5)
EOSINOPHIL # BLD AUTO: 0.21 K/UL — SIGNIFICANT CHANGE UP (ref 0–0.7)
EOSINOPHIL NFR BLD AUTO: 3.8 % — SIGNIFICANT CHANGE UP (ref 0–8)
GLUCOSE SERPL-MCNC: 192 MG/DL — HIGH (ref 70–99)
HCT VFR BLD CALC: 29.3 % — LOW (ref 37–47)
HGB BLD-MCNC: 9.2 G/DL — LOW (ref 12–16)
IMM GRANULOCYTES NFR BLD AUTO: 0.2 % — SIGNIFICANT CHANGE UP (ref 0.1–0.3)
LYMPHOCYTES # BLD AUTO: 1.39 K/UL — SIGNIFICANT CHANGE UP (ref 1.2–3.4)
LYMPHOCYTES # BLD AUTO: 25.2 % — SIGNIFICANT CHANGE UP (ref 20.5–51.1)
MCHC RBC-ENTMCNC: 30.8 PG — SIGNIFICANT CHANGE UP (ref 27–31)
MCHC RBC-ENTMCNC: 31.4 G/DL — LOW (ref 32–37)
MCV RBC AUTO: 98 FL — SIGNIFICANT CHANGE UP (ref 81–99)
MONOCYTES # BLD AUTO: 0.69 K/UL — HIGH (ref 0.1–0.6)
MONOCYTES NFR BLD AUTO: 12.5 % — HIGH (ref 1.7–9.3)
NEUTROPHILS # BLD AUTO: 3.2 K/UL — SIGNIFICANT CHANGE UP (ref 1.4–6.5)
NEUTROPHILS NFR BLD AUTO: 57.9 % — SIGNIFICANT CHANGE UP (ref 42.2–75.2)
NRBC # BLD: 0 /100 WBCS — SIGNIFICANT CHANGE UP (ref 0–0)
PLATELET # BLD AUTO: 232 K/UL — SIGNIFICANT CHANGE UP (ref 130–400)
POTASSIUM SERPL-MCNC: 4.5 MMOL/L — SIGNIFICANT CHANGE UP (ref 3.5–5)
POTASSIUM SERPL-SCNC: 4.5 MMOL/L — SIGNIFICANT CHANGE UP (ref 3.5–5)
RBC # BLD: 2.99 M/UL — LOW (ref 4.2–5.4)
RBC # FLD: 11.9 % — SIGNIFICANT CHANGE UP (ref 11.5–14.5)
SODIUM SERPL-SCNC: 134 MMOL/L — LOW (ref 135–146)
WBC # BLD: 5.52 K/UL — SIGNIFICANT CHANGE UP (ref 4.8–10.8)
WBC # FLD AUTO: 5.52 K/UL — SIGNIFICANT CHANGE UP (ref 4.8–10.8)

## 2018-09-18 RX ORDER — NIFEDIPINE 30 MG
120 TABLET, EXTENDED RELEASE 24 HR ORAL ONCE
Qty: 0 | Refills: 0 | Status: DISCONTINUED | OUTPATIENT
Start: 2018-09-18 | End: 2018-09-18

## 2018-09-18 RX ORDER — NIFEDIPINE 30 MG
120 TABLET, EXTENDED RELEASE 24 HR ORAL DAILY
Qty: 0 | Refills: 0 | Status: DISCONTINUED | OUTPATIENT
Start: 2018-09-18 | End: 2018-09-19

## 2018-09-18 RX ORDER — FUROSEMIDE 40 MG
20 TABLET ORAL
Qty: 0 | Refills: 0 | Status: DISCONTINUED | OUTPATIENT
Start: 2018-09-18 | End: 2018-09-19

## 2018-09-18 RX ADMIN — HEPARIN SODIUM 5000 UNIT(S): 5000 INJECTION INTRAVENOUS; SUBCUTANEOUS at 18:34

## 2018-09-18 RX ADMIN — OLANZAPINE 10 MILLIGRAM(S): 15 TABLET, FILM COATED ORAL at 11:21

## 2018-09-18 RX ADMIN — Medication 75 MILLIGRAM(S): at 14:53

## 2018-09-18 RX ADMIN — CITALOPRAM 20 MILLIGRAM(S): 10 TABLET, FILM COATED ORAL at 11:21

## 2018-09-18 RX ADMIN — Medication 20 MILLIGRAM(S): at 18:34

## 2018-09-18 RX ADMIN — Medication 81 MILLIGRAM(S): at 11:21

## 2018-09-18 RX ADMIN — Medication 1000 UNIT(S): at 11:21

## 2018-09-18 RX ADMIN — Medication 100 MILLIGRAM(S): at 18:33

## 2018-09-18 RX ADMIN — Medication 120 MILLIGRAM(S): at 18:34

## 2018-09-18 RX ADMIN — Medication 325 MILLIGRAM(S): at 18:33

## 2018-09-18 NOTE — PROGRESS NOTE ADULT - ASSESSMENT
68 years old female pt with past medical hx of type 2 DM, CKD stage 4 baseline crt 2.23, hypertension, alzheimer disease,  depression, delusional disorder, hyperlipidemia transferred to [facility] ER because of fall and low bp 80/50.     #WILIAM on ckd stage 4   - Initial improvement in Cr followed by progressive worsening. Cr = 6.1 today   - Can be progression of CKD to stage 5 following WILIAM   - as per our discussion with family no RRT    - pth/ phosphorus noted   - Phosphorus  at goal   - Repeat IP as last level is from 9/8.   - on Vitamin D       #HTN BP is persistently increased   - increase nifedipine XL to 120 mg.   - cont. hydralazine 75 and Labetalol 100 mg       anemia likely chronic  Transfuse if Hb<7    Will follow 68 years old female pt with past medical hx of type 2 DM, CKD stage 4 baseline crt 2.23, hypertension, alzheimer disease,  depression, delusional disorder, hyperlipidemia transferred to [facility] ER because of fall and low bp 80/50.     #WILIAM on ckd stage 4   - Initial improvement in Cr followed by progressive worsening. Cr = 6.1 today   - Can be progression of CKD to stage 5 following WILIAM   - Discussed with son regarding RRT options and he wanted us to dialyze her once needed and after all medical treatment options are used    -  no need for RRT for now    - pth/ phosphorus noted   - Phosphorus  at goal   - Repeat IP as last level is from 9/8.   - on Vitamin D       #HTN BP is persistently increased   - increase nifedipine XL to 120 mg.   - cont. hydralazine 75 and Labetalol 100 mg       anemia likely chronic  Transfuse if Hb<7    Will follow

## 2018-09-18 NOTE — CHART NOTE - NSCHARTNOTEFT_GEN_A_CORE
Registered Dietitian Follow-Up     Patient Profile Reviewed                           Yes [x]   No []     Nutrition History Previously Obtained        Yes [x]  No []       Pertinent Subjective Information: Pt. OOB to chair, very confused. Nodes her head when asked if she had breakfast today, unable to converse with pt. today. Per PCA pt. has been eating with good appetite, but needs total assistance. Pt. had 75% PO yesterday, and 100% PO at breakfast today.      Pertinent Medical Interventions:     Diet order: carb consistent, renal restrictions      Anthropometrics:  - Ht. 165.1cm  - Wt. no new weights documented   - %wt change  - BMI 27.3  - IBW      Pertinent Lab  ((9/17) RBC 2.73, Hg 8.5, hct 26.8, BUN 59, creat 6.1, glu 143, eGFR 6     Pertinent Meds: vit D, Ferrous sulfate, Atorvastatin      Physical Findings:  - Appearance: minimally alert, confused/disoriented   - GI function: no symptoms noted, no BM documented, fecal incontinence noted   - Tubes: no tubes noted  - Oral/Mouth cavity: no symptoms noted  - Skin: intact (BS 15)      Nutrition Requirements (from RD note on 9/14)  Weight Used:     Estimated Energy Needs    Continue [x]  Adjust [] 1708-2229kcal  Adjusted Energy Recommendations:   kcal/day        Estimated Protein Needs    Continue [x]  Adjust [] 59-74g  Adjusted Protein Recommendations:   gm/day        Estimated Fluid Needs        Continue [x]  Adjust [] per Nephro team  Adjusted Fluid Recommendations:   mL/day     Nutrient Intake: % PO per staff reports         [] Previous Nutrition Diagnosis: Inadequate oral intake            [] Ongoing          [x] Resolved         Nutrition Intervention: meals and snacks    RecL continue carb consistent, renal replacement diet order      Goal/Expected Outcome: In 7 days pt. to continue to consume % PO at meals      Indicator/Monitoring: energy intake, body composition, NFPF, glucose profile, electrolyte/renal profile Registered Dietitian Follow-Up     Patient Profile Reviewed                           Yes [x]   No []     Nutrition History Previously Obtained        Yes [x]  No []       Pertinent Subjective Information: Pt. OOB to chair, very confused. Nodes her head when asked if she had breakfast today, unable to converse with pt. today. Per PCA pt. has been eating with good appetite, but needs total assistance. Pt. had 50-75% PO yesterday for breakfast and lunch, but no intake at dinner.  75% PO at breakfast today. Previously recommended supplement has not been ordered- will communicate with resident regarding supplementation needs.      Pertinent Medical Interventions: WILIAM on CKD stage 4 ( WILIAM on CKD vs CKD worsening- -As per Nephro, no RRT after discussion with family. anemia normocytic ( Anemia 2/2 ckd)- transfuse if hb less than 7. DM2: - last hba1c was 6. Depression and delusion disorder- on home meds.      Diet order: carb consistent, renal restrictions      Anthropometrics:  - Ht. 165.1cm  - Wt. no new weights documented   - %wt change  - BMI 27.3  - IBW 115lbs      Pertinent Lab  ((9/17) RBC 2.73, Hg 8.5, hct 26.8, BUN 59, creat 6.1, glu 143, eGFR 6     Pertinent Meds: vit D, Ferrous sulfate, Atorvastatin      Physical Findings:  - Appearance: minimally alert, confused/disoriented   - GI function: no symptoms noted, no BM documented, fecal incontinence noted   - Tubes: no tubes noted  - Oral/Mouth cavity: no symptoms noted  - Skin: intact (BS 15)      Nutrition Requirements (from RD note on 9/14)  Weight Used:     Estimated Energy Needs    Continue [x]  Adjust [] 1708-2229kcal  Adjusted Energy Recommendations:   kcal/day        Estimated Protein Needs    Continue [x]  Adjust [] 59-74g  Adjusted Protein Recommendations:   gm/day        Estimated Fluid Needs        Continue [x]  Adjust [] per Nephro team  Adjusted Fluid Recommendations:   mL/day     Nutrient Intake: % PO per staff reports         [] Previous Nutrition Diagnosis: Inadequate oral intake- improving.             [x] Ongoing          [] Resolved         Nutrition Intervention: meals and snacks, medical food supplements    Rec: continue carb consistent, renal replacement diet order and 1:1 feed. Order Glucerna BID.      Goal/Expected Outcome: In 7 days pt. to continue to consume % PO at meals      Indicator/Monitoring: energy intake, body composition, NFPF, glucose profile, electrolyte/renal profile

## 2018-09-18 NOTE — PROGRESS NOTE ADULT - SUBJECTIVE AND OBJECTIVE BOX
SUBJECTIVE:    Patient is a 68y old Female who presents with a chief complaint of fall and low BP 80/50 (18 Sep 2018 09:42)    Currently admitted to medicine with the primary diagnosis of Uremic encephalopathy     Today is hospital day 11d. This morning she is resting comfortably in bed and reports no new issues or overnight events. Pt seems more conversational and participates in PT though still is AAO x 1 and answers inappropriately.     PAST MEDICAL & SURGICAL HISTORY  Depression, unspecified depression type  Alzheimer disease  Diabetes  HTN (hypertension)  High cholesterol  No significant past surgical history    SOCIAL HISTORY:  Negative for smoking/alcohol/drug use.     ALLERGIES:  No Known Allergies    MEDICATIONS:  STANDING MEDICATIONS  aspirin  chewable 81 milliGRAM(s) Oral daily  atorvastatin 40 milliGRAM(s) Oral at bedtime  cholecalciferol 1000 Unit(s) Oral daily  citalopram 20 milliGRAM(s) Oral daily  ferrous    sulfate 325 milliGRAM(s) Oral two times a day  furosemide    Tablet 20 milliGRAM(s) Oral two times a day  heparin  Injectable 5000 Unit(s) SubCutaneous every 12 hours  hydrALAZINE 75 milliGRAM(s) Oral every 8 hours  labetalol 100 milliGRAM(s) Oral every 12 hours  NIFEdipine  milliGRAM(s) Oral daily  OLANZapine 10 milliGRAM(s) Oral daily    PRN MEDICATIONS    VITALS:   T(F): 96.4  HR: 73  BP: 166/83  RR: 18  SpO2: 93%    LABS:                        9.2    5.52  )-----------( 232      ( 18 Sep 2018 10:47 )             29.3     09-18    134<L>  |  96<L>  |  62<HH>  ----------------------------<  192<H>  4.5   |  18  |  6.0<HH>    Ca    9.5      18 Sep 2018 10:47                PHYSICAL EXAM:  GEN: No acute distress  LUNGS: Clear to auscultation bilaterally   HEART: S1/S2 present. RRR.   ABD: Soft, non-tender, non-distended. Bowel sounds present  EXT: NC/NC/NE/2+PP/NEWELL  NEURO: AAOX1

## 2018-09-18 NOTE — PROGRESS NOTE ADULT - PROVIDER SPECIALTY LIST ADULT
Hospitalist
Internal Medicine
Nephrology
Internal Medicine
Nephrology
Nephrology

## 2018-09-18 NOTE — PROGRESS NOTE ADULT - ASSESSMENT
68 years old female pt with past medical hx of type 2 DM, CKD stage 4 baseline crt 2.23 ,hypertension, alzheimer disease depression, delusional disorder, hyperlipidemia transferred to [facility] ER because of fall, when her vitals were checked bp was low 80/50. She has been weak and lethargic for few weeks, has poor appetite.    In ER stroke code was called, but as per neurology no intervention, no acute pathology on ct scan head, received total of 2 L of LR (07 Sep 2018 16:11)    # WILIAM on CKD stage 4 ( WILIAM on CKD vs CKD worsening)  - likely worsening of CKD  to stage 5   - Serum creatinine since admission has been 4.6 - >4.8 -> 4.5 -> 4.4 -> 5.3> 5.4> 5.7> 6.1>6.0 today  - creatinine in last 2017 2.23  -creatinine didnot improve with hydration, received NS 75 cc/hr.   - VA duplex pelvic vessels did not show renal artery stenosis  -USG showed   Bilateral renal disease. No hydronephrosis.   Nondiagnostic examination of the urinary bladder.  -As per Nephro, no RRT indiacted at this time. Son wants RRT only when others options have been used.   - As per nephro, will begin 20mg po BID.  - c/w Ferrous sulphate BID oral  -Cholecalciferol 1000 units oral  -  Vit D 1,25 levels- 15.7  - f/u Phosphorus levels in AM.  - Strict I and O's.       # anemia normocytic ( Anemia 2/2 ckd)  - Iron studies ferritin 195, iron 47, iron binding capacity 153, total iron binding capacity 200, percentage saturation 24%.  - Hb has been 9.6 - 8.7 - 8.7 - 8.7 -> 10.2-->9.5> 8.4> 8.5>9.2 today  - FOBT- pending   - c/w ferrous sulphate bid  - type and screen, will transfuse if Hb less than 7 or if signs of active bleeding.  -f/u am CBC    # Hypertension-   - were held off initially because of low BP.   -will c/w , labetalol 100q12 and hydralazine 75 q8  - increase nifedipine to 120     # type 2 DM   - Not on long term insulin  - check blood glucose AC+HS  - Start insulin if blood glucose is consistently above 180  - last hba1c was 6      # depression and delusion disorder  - on home meds - zyprexa abd celexa    # DVT prophylaxis; Heparin  #  diet- carb consistent with evening snack,  renal diet, with low K, glucerna BID.   # activity fall precaution  # full code  #dispo- rehab  # anticipate for tomorrow.

## 2018-09-18 NOTE — PROGRESS NOTE ADULT - SUBJECTIVE AND OBJECTIVE BOX
Nephrology progress note    Patient is seen and examined, events over the last 24 h noted.  Patient confused, though as per resident better than before. AO x 1.    Allergies:  No Known Allergies    Hospital Medications:   MEDICATIONS  (STANDING):  aspirin  chewable 81 milliGRAM(s) Oral daily  atorvastatin 40 milliGRAM(s) Oral at bedtime  cholecalciferol 1000 Unit(s) Oral daily  citalopram 20 milliGRAM(s) Oral daily  ferrous    sulfate 325 milliGRAM(s) Oral two times a day  heparin  Injectable 5000 Unit(s) SubCutaneous every 12 hours  hydrALAZINE 75 milliGRAM(s) Oral every 8 hours  labetalol 100 milliGRAM(s) Oral every 12 hours  NIFEdipine XL 90 milliGRAM(s) Oral daily  OLANZapine 10 milliGRAM(s) Oral daily        VITALS:  T(F): 96.4 (09-18-18 @ 05:00), Max: 97.6 (09-17-18 @ 13:03)  HR: 73 (09-18-18 @ 05:00)  BP: 166/83 (09-18-18 @ 05:00)  RR: 18 (09-18-18 @ 05:00)  SpO2: 93% (09-17-18 @ 19:40)          PHYSICAL EXAM:  Constitutional: NAD  Respiratory: CTAB, no wheezes, rales or rhonchi  Cardiovascular: S1, S2, RRR  Gastrointestinal: BS+, soft, NT/ND  Extremities: No cyanosis or clubbing. No peripheral edema  :  No patterson.       LABS:  09-17    136  |  98  |  59<H>  ----------------------------<  143<H>  4.6   |  23  |  6.1<HH>    Creatinine Trend: 6.1<--, 5.7<--, 5.7<--, 5.4<--, 5.3<--, 4.4<--    Ca    9.7      17 Sep 2018 10:17    Phosphorus Level, Serum: 4.8 mg/dL (09.08.18 @ 08:49)                          8.5    6.06  )-----------( 239      ( 17 Sep 2018 10:17 )             26.8       Urine Studies:    Creatinine, Random Urine: 60 mg/dL (09-11 @ 16:54)  Chloride, Random Urine: 61.3 (09-11 @ 16:54)    RADIOLOGY & ADDITIONAL STUDIES:

## 2018-09-18 NOTE — PROGRESS NOTE ADULT - ATTENDING COMMENTS
69 yo woman with alzheimers dementia, htn, CKD stage 4 (baseline cr 2) dm who p/w fall, weakness and hypotension initially admitted for WILIAM on CKD stage 4    #Progression of CKD stage 4:  No significant improvement with fluids. Non-oliguric  Nephro says that with or without HD her life expectancy is 6-8 months  After nephro's formal discussion with the son 9/18, no RRT indiacted at this time. Son wants RRT only when others options have been used.     Will clarify with Nephro if we can proceed with Discharging the patient and having her f/u Nephro as outpatient.          #anemia- mixed (Anemia of chronic disease with iron def)  -monitor cbc--      #DVT/GI ppx    FULL CODE--need to continue to readdress GOC with son.    Will d/w son if palliative care or hospice needs to see pt : renal failure, dementia, no RRT .
patient seen and examined independently on morning rounds, chart reviewed and discussed with the medicine resident and agree with above resident note with the following addendum:    67 yo woman with alzheimers dementia, htn, CKD stage 4 (baseline cr 2) dm who p/w fall, weakness and hypotension initially and admitted for WILIAM on CKD.  -continue to monitor bmp (cr up to 5.4)---also with worsening uremia and declining mental status  -no improvement after IVF  -renal consult  -as per family do not want to pursue HD 2/2 poor functional status and worsening dementia  -renal us shows chronic bilateral renal disease with no hydronephrosis  -cont to monitor cbc closely---suppl iron  -bp better controlled now on current regimen---adjust doses as needed    #DVT/GI ppx  FULL CODE--need to readdress with son [facility]
69 yo woman with alzheimers dementia, htn, CKD stage 4 (baseline cr 2) dm who p/w fall, weakness and hypotension initially admitted for WILIAM on CKD stage 4    #Progression of CKD stage 4:  No significant improvement with fluids. Non-oliguric  Nephro says that with or without HD her life expectancy is 6-8 months  After nephro's formal discussion with the son, Proceed with Hospice.         #anemia- mixed (Anemia of chronic disease with iron def)  -monitor cbc--  -transfuse if hb <8 or signs of active bleeding  -start iron suppl bid  -check FOBT     #DVT/GI ppx    FULL CODE--need to continue to readdress GOC with son.    palliative care or hospice needs to see pt : renal failure, dementia, no RRT
I was Physically Present for the key portions of the evaluation   I agree with the above History  , Physical examination Assessment and plan   I have Reviewed , Modified or appended where appropriate.  Please check A and P as above   1- WILIAM on CKD 4 vs progression of CKD creat is up  strict I and O  consider lasix 20 po q12h  no need for urgent RRT  Spoke with son who wants to try HD once it is needed   2- HTN management as above
patient seen and examined independently

## 2018-09-18 NOTE — PROGRESS NOTE ADULT - REASON FOR ADMISSION
fall and low BP 80/50
fall and low BP 80/50
s/p fall and low BP 80/50
fall and low BP 80/50

## 2018-09-19 VITALS
HEIGHT: 62 IN | HEART RATE: 85 BPM | TEMPERATURE: 98 F | WEIGHT: 133.38 LBS | SYSTOLIC BLOOD PRESSURE: 171 MMHG | DIASTOLIC BLOOD PRESSURE: 86 MMHG

## 2018-09-19 LAB
ANION GAP SERPL CALC-SCNC: 18 MMOL/L — HIGH (ref 7–14)
BASOPHILS # BLD AUTO: 0.02 K/UL — SIGNIFICANT CHANGE UP (ref 0–0.2)
BASOPHILS NFR BLD AUTO: 0.3 % — SIGNIFICANT CHANGE UP (ref 0–1)
BUN SERPL-MCNC: 63 MG/DL — CRITICAL HIGH (ref 10–20)
CALCIUM SERPL-MCNC: 9.6 MG/DL — SIGNIFICANT CHANGE UP (ref 8.5–10.1)
CHLORIDE SERPL-SCNC: 102 MMOL/L — SIGNIFICANT CHANGE UP (ref 98–110)
CO2 SERPL-SCNC: 21 MMOL/L — SIGNIFICANT CHANGE UP (ref 17–32)
CREAT SERPL-MCNC: 5.9 MG/DL — CRITICAL HIGH (ref 0.7–1.5)
EOSINOPHIL # BLD AUTO: 0.21 K/UL — SIGNIFICANT CHANGE UP (ref 0–0.7)
EOSINOPHIL NFR BLD AUTO: 3.6 % — SIGNIFICANT CHANGE UP (ref 0–8)
GLUCOSE SERPL-MCNC: 91 MG/DL — SIGNIFICANT CHANGE UP (ref 70–99)
HCT VFR BLD CALC: 27.1 % — LOW (ref 37–47)
HGB BLD-MCNC: 8.7 G/DL — LOW (ref 12–16)
IMM GRANULOCYTES NFR BLD AUTO: 0.2 % — SIGNIFICANT CHANGE UP (ref 0.1–0.3)
LYMPHOCYTES # BLD AUTO: 1.51 K/UL — SIGNIFICANT CHANGE UP (ref 1.2–3.4)
LYMPHOCYTES # BLD AUTO: 25.7 % — SIGNIFICANT CHANGE UP (ref 20.5–51.1)
MCHC RBC-ENTMCNC: 31.2 PG — HIGH (ref 27–31)
MCHC RBC-ENTMCNC: 32.1 G/DL — SIGNIFICANT CHANGE UP (ref 32–37)
MCV RBC AUTO: 97.1 FL — SIGNIFICANT CHANGE UP (ref 81–99)
MONOCYTES # BLD AUTO: 0.8 K/UL — HIGH (ref 0.1–0.6)
MONOCYTES NFR BLD AUTO: 13.6 % — HIGH (ref 1.7–9.3)
NEUTROPHILS # BLD AUTO: 3.33 K/UL — SIGNIFICANT CHANGE UP (ref 1.4–6.5)
NEUTROPHILS NFR BLD AUTO: 56.6 % — SIGNIFICANT CHANGE UP (ref 42.2–75.2)
NRBC # BLD: 0 /100 WBCS — SIGNIFICANT CHANGE UP (ref 0–0)
PHOSPHATE SERPL-MCNC: 5.3 MG/DL — HIGH (ref 2.1–4.9)
PLATELET # BLD AUTO: 257 K/UL — SIGNIFICANT CHANGE UP (ref 130–400)
POTASSIUM SERPL-MCNC: 4.5 MMOL/L — SIGNIFICANT CHANGE UP (ref 3.5–5)
POTASSIUM SERPL-SCNC: 4.5 MMOL/L — SIGNIFICANT CHANGE UP (ref 3.5–5)
RBC # BLD: 2.79 M/UL — LOW (ref 4.2–5.4)
RBC # FLD: 11.8 % — SIGNIFICANT CHANGE UP (ref 11.5–14.5)
SODIUM SERPL-SCNC: 141 MMOL/L — SIGNIFICANT CHANGE UP (ref 135–146)
WBC # BLD: 5.88 K/UL — SIGNIFICANT CHANGE UP (ref 4.8–10.8)
WBC # FLD AUTO: 5.88 K/UL — SIGNIFICANT CHANGE UP (ref 4.8–10.8)

## 2018-09-19 RX ORDER — ATORVASTATIN CALCIUM 80 MG/1
1 TABLET, FILM COATED ORAL
Qty: 30 | Refills: 0 | OUTPATIENT
Start: 2018-09-19 | End: 2018-10-18

## 2018-09-19 RX ORDER — HYDRALAZINE HCL 50 MG
3 TABLET ORAL
Qty: 270 | Refills: 0 | OUTPATIENT
Start: 2018-09-19 | End: 2018-10-18

## 2018-09-19 RX ORDER — HYDRALAZINE HCL 50 MG
3 TABLET ORAL
Qty: 0 | Refills: 0 | COMMUNITY
Start: 2018-09-19 | End: 2018-10-18

## 2018-09-19 RX ORDER — LABETALOL HCL 100 MG
1 TABLET ORAL
Qty: 60 | Refills: 0 | OUTPATIENT
Start: 2018-09-19 | End: 2018-10-18

## 2018-09-19 RX ORDER — ASPIRIN/CALCIUM CARB/MAGNESIUM 324 MG
1 TABLET ORAL
Qty: 30 | Refills: 0 | OUTPATIENT
Start: 2018-09-19 | End: 2018-10-18

## 2018-09-19 RX ORDER — CHOLECALCIFEROL (VITAMIN D3) 125 MCG
1 CAPSULE ORAL
Qty: 30 | Refills: 0 | OUTPATIENT
Start: 2018-09-19 | End: 2018-10-18

## 2018-09-19 RX ORDER — FUROSEMIDE 40 MG
1 TABLET ORAL
Qty: 60 | Refills: 0 | OUTPATIENT
Start: 2018-09-19 | End: 2018-10-18

## 2018-09-19 RX ORDER — NIFEDIPINE 30 MG
2 TABLET, EXTENDED RELEASE 24 HR ORAL
Qty: 60 | Refills: 0 | OUTPATIENT
Start: 2018-09-19 | End: 2018-10-18

## 2018-09-19 RX ORDER — HYDRALAZINE HCL 50 MG
1 TABLET ORAL
Qty: 0 | Refills: 0 | COMMUNITY

## 2018-09-19 RX ORDER — FERROUS SULFATE 325(65) MG
1 TABLET ORAL
Qty: 60 | Refills: 0 | OUTPATIENT
Start: 2018-09-19 | End: 2018-10-18

## 2018-09-19 RX ORDER — ROSUVASTATIN CALCIUM 5 MG/1
1 TABLET ORAL
Qty: 0 | Refills: 0 | COMMUNITY

## 2018-09-19 RX ORDER — NIFEDIPINE 30 MG
1 TABLET, EXTENDED RELEASE 24 HR ORAL
Qty: 0 | Refills: 0 | COMMUNITY

## 2018-09-19 RX ADMIN — Medication 20 MILLIGRAM(S): at 06:07

## 2018-09-19 RX ADMIN — Medication 120 MILLIGRAM(S): at 06:46

## 2018-09-19 RX ADMIN — Medication 325 MILLIGRAM(S): at 17:08

## 2018-09-19 RX ADMIN — Medication 100 MILLIGRAM(S): at 06:07

## 2018-09-19 RX ADMIN — CITALOPRAM 20 MILLIGRAM(S): 10 TABLET, FILM COATED ORAL at 11:27

## 2018-09-19 RX ADMIN — Medication 75 MILLIGRAM(S): at 06:07

## 2018-09-19 RX ADMIN — OLANZAPINE 10 MILLIGRAM(S): 15 TABLET, FILM COATED ORAL at 11:27

## 2018-09-19 RX ADMIN — Medication 1000 UNIT(S): at 11:27

## 2018-09-19 RX ADMIN — HEPARIN SODIUM 5000 UNIT(S): 5000 INJECTION INTRAVENOUS; SUBCUTANEOUS at 17:08

## 2018-09-19 RX ADMIN — Medication 325 MILLIGRAM(S): at 06:07

## 2018-09-19 RX ADMIN — Medication 81 MILLIGRAM(S): at 11:27

## 2018-09-19 RX ADMIN — Medication 20 MILLIGRAM(S): at 17:08

## 2018-09-19 RX ADMIN — Medication 100 MILLIGRAM(S): at 17:08

## 2018-09-19 RX ADMIN — HEPARIN SODIUM 5000 UNIT(S): 5000 INJECTION INTRAVENOUS; SUBCUTANEOUS at 06:07

## 2018-09-19 RX ADMIN — Medication 75 MILLIGRAM(S): at 13:10

## 2018-09-19 NOTE — DISCHARGE NOTE ADULT - MEDICATION SUMMARY - MEDICATIONS TO TAKE
I will START or STAY ON the medications listed below when I get home from the hospital:    aspirin 81 mg oral tablet, chewable  -- 1 tab(s) by mouth once a day  -- Indication: For cardioprotective    CeleXA 20 mg oral tablet  -- 1 tab(s) by mouth once a day  -- Indication: For Depression, unspecified depression type    rosuvastatin 10 mg oral tablet  -- 1 tab(s) by mouth once a day (at bedtime)  -- Indication: For High cholesterol    ZyPREXA 10 mg oral tablet  -- 1 tab(s) by mouth once a day  -- Indication: For Depression, unspecified depression type    labetalol 100 mg oral tablet  -- 1 tab(s) by mouth every 12 hours  -- Indication: For HTN (hypertension)    Procardia XL 60 mg oral tablet, extended release  -- 2 tab(s) by mouth once a day   -- Avoid grapefruit and grapefruit juice while taking this medication.  It is very important that you take or use this exactly as directed.  Do not skip doses or discontinue unless directed by your doctor.  Some non-prescription drugs may aggravate your condition.  Read all labels carefully.  If a warning appears, check with your doctor before taking.  Swallow whole.  Do not crush.    -- Indication: For HTN (hypertension)    furosemide 20 mg oral tablet  -- 1 tab(s) by mouth 2 times a day  -- Indication: For HTN (hypertension)    ferrous sulfate 325 mg (65 mg elemental iron) oral tablet  -- 1 tab(s) by mouth 2 times a day   -- Check with your doctor before becoming pregnant.  Do not chew, break, or crush.  May discolor urine or feces.    -- Indication: For supplement    hydrALAZINE 25 mg oral tablet  -- 3 tab(s) by mouth every 8 hours  -- Indication: For blood pressure    cholecalciferol oral tablet  -- 1 tab(s) by mouth once a day   -- Indication: For supplement I will START or STAY ON the medications listed below when I get home from the hospital:    aspirin 81 mg oral tablet, chewable  -- 1 tab(s) by mouth once a day  -- Indication: For cardioprotective    CeleXA 20 mg oral tablet  -- 1 tab(s) by mouth once a day  -- Indication: For Depression, unspecified depression type    atorvastatin 40 mg oral tablet  -- 1 tab(s) by mouth once a day (at bedtime)  -- Indication: For High cholesterol    ZyPREXA 10 mg oral tablet  -- 1 tab(s) by mouth once a day  -- Indication: For Depression, unspecified depression type    labetalol 100 mg oral tablet  -- 1 tab(s) by mouth every 12 hours  -- Indication: For HTN (hypertension)    Procardia XL 60 mg oral tablet, extended release  -- 2 tab(s) by mouth once a day   -- Avoid grapefruit and grapefruit juice while taking this medication.  It is very important that you take or use this exactly as directed.  Do not skip doses or discontinue unless directed by your doctor.  Some non-prescription drugs may aggravate your condition.  Read all labels carefully.  If a warning appears, check with your doctor before taking.  Swallow whole.  Do not crush.    -- Indication: For HTN (hypertension)    furosemide 20 mg oral tablet  -- 1 tab(s) by mouth 2 times a day  -- Indication: For HTN (hypertension)    hydrALAZINE 25 mg oral tablet  -- 3 tab(s) by mouth every 8 hours  -- Indication: For blood pressure    cholecalciferol oral tablet  -- 1 tab(s) by mouth once a day   -- Indication: For supplement

## 2018-09-19 NOTE — DISCHARGE NOTE ADULT - HOSPITAL COURSE
Ms. Felix presented with h/o fall and low blood pressure. Pt has a history of dementia and is AA0X1 at baseline.   on lab investigation, Pt  kidney function had gone down( worsened kidney function). Pt  received iv fluids to improve kidney function but without any improvement in Creat levels. ( likely worsening CKD status).  Inaptient, pt's electrolytes were repleted and  blood pressure control medications were optimized. Nephrologists followed the case and believed pt's kidney function declined rather than a temporary injury to kidney.     Pt was started on conservative management of CKD with lasix, supplements and blood pressure medications. Pt mental status improved with return to her baseline.    Pt's son was consulted ( health care proxy) and he didnot want Hemodialysis for his mother at this time and wanted to try conservative management. Renal suggested that RRT was not indicated at this time.     Pt being discharged in stable condition with advise to follow up with Dr. Donald Lemus ( kidney doctor) for further management of your kidney function.  Pt advised to continue taking blood pressure medications and supplements as advised. Ms. Felix presented with h/o fall and low blood pressure. Pt has a history of dementia and is AA0X1 at baseline.   on lab investigation, Pt  kidney function had gone down( worsened kidney function). Pt  received iv fluids to improve kidney function but without any improvement in Creat levels. ( likely worsening CKD status).  Inaptient, pt's electrolytes were repleted and  blood pressure control medications were optimized. Nephrologists followed the case and believed pt's kidney function declined rather than a temporary injury to kidney.     Pt was started on conservative management of CKD with lasix, supplements and blood pressure medications. Pt mental status improved with return to her baseline.    Pt's son was consulted ( health care proxy) and he didnot want Hemodialysis for his mother at this time and wanted to try conservative management. Renal suggested that RRT was not indicated at this time.     Pt being discharged in stable condition with advise to follow up with Dr. Donald Lemus ( kidney doctor) for further management of your kidney function.  Pt advised to continue taking blood pressure medications and supplements as advised.    Attending Note :   D/c plan reviewed and agreed. Son wants conservative management for now. He can consider HD when it comes to a stage that all other treatment options have exhausted. Will f/u Dr Lemus outpatient.   Patient is back to her baseline dementia status.

## 2018-09-19 NOTE — DISCHARGE NOTE ADULT - PLAN OF CARE
optimize medical management You presented with h/o fall and low blood pressure.  on lab investigation, your kidney function had gone down. We gave you iv fluids to improve your kidney function but without any improvement.   We managed your electrolytes and optimized your blood pressure control. kidney doctors followed you and believed your kidney function declined rather than a temporary injury to kidney.     We started you on conservative management of CKD with lasix, supplements and blood pressure medications.  Your son did not want any Hemodialysis for you at this time.   Please follow up with Dr. Donald Lemus ( kidney doctor) for further management of your kidney function.

## 2018-09-19 NOTE — DISCHARGE NOTE ADULT - CARE PLAN
Principal Discharge DX:	Uremic encephalopathy  Goal:	optimize medical management  Assessment and plan of treatment:	You presented with h/o fall and low blood pressure.  on lab investigation, your kidney function had gone down. We gave you iv fluids to improve your kidney function but without any improvement.   We managed your electrolytes and optimized your blood pressure control. kidney doctors followed you and believed your kidney function declined rather than a temporary injury to kidney.     We started you on conservative management of CKD with lasix, supplements and blood pressure medications.  Your son did not want any Hemodialysis for you at this time.   Please follow up with Dr. Donald Lemus ( kidney doctor) for further management of your kidney function.

## 2018-09-19 NOTE — DISCHARGE NOTE ADULT - INSTRUCTIONS
please follow a diet that is carbohydrate consistent. PLease follow a renal diet- with no concentrated potassium, no concentrated phosphate and low sodium.

## 2018-09-19 NOTE — DISCHARGE NOTE ADULT - PATIENT PORTAL LINK FT
You can access the [facility] Patient Portal, offered by [facility], by registering with the following website: [email or web address]

## 2018-09-19 NOTE — DISCHARGE NOTE ADULT - MEDICATION SUMMARY - MEDICATIONS TO STOP TAKING
I will STOP taking the medications listed below when I get home from the hospital:  None I will STOP taking the medications listed below when I get home from the hospital:    rosuvastatin 10 mg oral tablet  -- 1 tab(s) by mouth once a day (at bedtime)

## 2018-09-19 NOTE — DISCHARGE NOTE ADULT - CARE PROVIDER_API CALL
Donald Riojas), Internal Medicine; Nephrology  [address] [place] [id number]  Phone: [phone]  Fax: [phone]

## 2018-09-21 DIAGNOSIS — F32.9 MAJOR DEPRESSIVE DISORDER, SINGLE EPISODE, UNSPECIFIED: ICD-10-CM

## 2018-09-21 DIAGNOSIS — G30.9 ALZHEIMER'S DISEASE, UNSPECIFIED: ICD-10-CM

## 2018-09-21 DIAGNOSIS — D50.9 IRON DEFICIENCY ANEMIA, UNSPECIFIED: ICD-10-CM

## 2018-09-21 DIAGNOSIS — Z91.81 HISTORY OF FALLING: ICD-10-CM

## 2018-09-21 DIAGNOSIS — N18.5 CHRONIC KIDNEY DISEASE, STAGE 5: ICD-10-CM

## 2018-09-21 DIAGNOSIS — E86.0 DEHYDRATION: ICD-10-CM

## 2018-09-21 DIAGNOSIS — N17.9 ACUTE KIDNEY FAILURE, UNSPECIFIED: ICD-10-CM

## 2018-09-21 DIAGNOSIS — I12.9 HYPERTENSIVE CHRONIC KIDNEY DISEASE WITH STAGE 1 THROUGH STAGE 4 CHRONIC KIDNEY DISEASE, OR UNSPECIFIED CHRONIC KIDNEY DISEASE: ICD-10-CM

## 2018-09-21 DIAGNOSIS — G93.41 METABOLIC ENCEPHALOPATHY: ICD-10-CM

## 2018-09-21 DIAGNOSIS — F22 DELUSIONAL DISORDERS: ICD-10-CM

## 2018-09-21 DIAGNOSIS — I95.89 OTHER HYPOTENSION: ICD-10-CM

## 2018-09-21 DIAGNOSIS — F02.80 DEMENTIA IN OTHER DISEASES CLASSIFIED ELSEWHERE, UNSPECIFIED SEVERITY, WITHOUT BEHAVIORAL DISTURBANCE, PSYCHOTIC DISTURBANCE, MOOD DISTURBANCE, AND ANXIETY: ICD-10-CM

## 2018-09-21 DIAGNOSIS — R53.1 WEAKNESS: ICD-10-CM

## 2018-09-24 ENCOUNTER — OUTPATIENT (OUTPATIENT)
Dept: OUTPATIENT SERVICES | Facility: HOSPITAL | Age: 68
LOS: 1 days | Discharge: HOME | End: 2018-09-24

## 2018-09-24 DIAGNOSIS — I10 ESSENTIAL (PRIMARY) HYPERTENSION: ICD-10-CM

## 2018-09-24 DIAGNOSIS — D64.9 ANEMIA, UNSPECIFIED: ICD-10-CM

## 2018-09-24 PROBLEM — E78.00 PURE HYPERCHOLESTEROLEMIA, UNSPECIFIED: Chronic | Status: ACTIVE | Noted: 2018-09-07

## 2018-09-24 PROBLEM — G30.9 ALZHEIMER'S DISEASE, UNSPECIFIED: Chronic | Status: ACTIVE | Noted: 2018-09-07

## 2018-09-24 PROBLEM — E11.9 TYPE 2 DIABETES MELLITUS WITHOUT COMPLICATIONS: Chronic | Status: ACTIVE | Noted: 2018-09-07

## 2018-09-24 PROBLEM — F32.9 MAJOR DEPRESSIVE DISORDER, SINGLE EPISODE, UNSPECIFIED: Chronic | Status: ACTIVE | Noted: 2018-09-07

## 2018-10-01 ENCOUNTER — OUTPATIENT (OUTPATIENT)
Dept: OUTPATIENT SERVICES | Facility: HOSPITAL | Age: 68
LOS: 1 days | Discharge: HOME | End: 2018-10-01

## 2018-10-01 DIAGNOSIS — N18.9 CHRONIC KIDNEY DISEASE, UNSPECIFIED: ICD-10-CM

## 2018-10-01 DIAGNOSIS — I10 ESSENTIAL (PRIMARY) HYPERTENSION: ICD-10-CM

## 2018-10-01 DIAGNOSIS — D64.9 ANEMIA, UNSPECIFIED: ICD-10-CM

## 2018-10-08 ENCOUNTER — OUTPATIENT (OUTPATIENT)
Dept: OUTPATIENT SERVICES | Facility: HOSPITAL | Age: 68
LOS: 1 days | Discharge: HOME | End: 2018-10-08

## 2018-10-08 DIAGNOSIS — N18.9 CHRONIC KIDNEY DISEASE, UNSPECIFIED: ICD-10-CM

## 2018-10-15 ENCOUNTER — OUTPATIENT (OUTPATIENT)
Dept: OUTPATIENT SERVICES | Facility: HOSPITAL | Age: 68
LOS: 1 days | Discharge: HOME | End: 2018-10-15

## 2018-10-15 DIAGNOSIS — N18.9 CHRONIC KIDNEY DISEASE, UNSPECIFIED: ICD-10-CM

## 2018-10-22 ENCOUNTER — OUTPATIENT (OUTPATIENT)
Dept: OUTPATIENT SERVICES | Facility: HOSPITAL | Age: 68
LOS: 1 days | Discharge: HOME | End: 2018-10-22

## 2018-10-22 DIAGNOSIS — N18.9 CHRONIC KIDNEY DISEASE, UNSPECIFIED: ICD-10-CM

## 2018-10-29 ENCOUNTER — OUTPATIENT (OUTPATIENT)
Dept: OUTPATIENT SERVICES | Facility: HOSPITAL | Age: 68
LOS: 1 days | Discharge: HOME | End: 2018-10-29

## 2018-10-29 DIAGNOSIS — N18.9 CHRONIC KIDNEY DISEASE, UNSPECIFIED: ICD-10-CM

## 2018-11-05 ENCOUNTER — OUTPATIENT (OUTPATIENT)
Dept: OUTPATIENT SERVICES | Facility: HOSPITAL | Age: 68
LOS: 1 days | Discharge: HOME | End: 2018-11-05

## 2018-11-05 DIAGNOSIS — N18.9 CHRONIC KIDNEY DISEASE, UNSPECIFIED: ICD-10-CM

## 2018-11-12 ENCOUNTER — OUTPATIENT (OUTPATIENT)
Dept: OUTPATIENT SERVICES | Facility: HOSPITAL | Age: 68
LOS: 1 days | Discharge: HOME | End: 2018-11-12

## 2018-11-12 DIAGNOSIS — N18.9 CHRONIC KIDNEY DISEASE, UNSPECIFIED: ICD-10-CM

## 2018-11-19 ENCOUNTER — OUTPATIENT (OUTPATIENT)
Dept: OUTPATIENT SERVICES | Facility: HOSPITAL | Age: 68
LOS: 1 days | Discharge: HOME | End: 2018-11-19

## 2018-11-19 DIAGNOSIS — N18.9 CHRONIC KIDNEY DISEASE, UNSPECIFIED: ICD-10-CM

## 2018-11-26 ENCOUNTER — OUTPATIENT (OUTPATIENT)
Dept: OUTPATIENT SERVICES | Facility: HOSPITAL | Age: 68
LOS: 1 days | Discharge: HOME | End: 2018-11-26

## 2018-11-26 DIAGNOSIS — N18.9 CHRONIC KIDNEY DISEASE, UNSPECIFIED: ICD-10-CM

## 2018-12-03 ENCOUNTER — OUTPATIENT (OUTPATIENT)
Dept: OUTPATIENT SERVICES | Facility: HOSPITAL | Age: 68
LOS: 1 days | Discharge: HOME | End: 2018-12-03

## 2018-12-03 DIAGNOSIS — N18.9 CHRONIC KIDNEY DISEASE, UNSPECIFIED: ICD-10-CM

## 2018-12-10 ENCOUNTER — OUTPATIENT (OUTPATIENT)
Dept: OUTPATIENT SERVICES | Facility: HOSPITAL | Age: 68
LOS: 1 days | Discharge: HOME | End: 2018-12-10

## 2018-12-10 DIAGNOSIS — N18.9 CHRONIC KIDNEY DISEASE, UNSPECIFIED: ICD-10-CM

## 2018-12-17 ENCOUNTER — OUTPATIENT (OUTPATIENT)
Dept: OUTPATIENT SERVICES | Facility: HOSPITAL | Age: 68
LOS: 1 days | Discharge: HOME | End: 2018-12-17

## 2018-12-17 DIAGNOSIS — I10 ESSENTIAL (PRIMARY) HYPERTENSION: ICD-10-CM

## 2018-12-17 DIAGNOSIS — D64.9 ANEMIA, UNSPECIFIED: ICD-10-CM

## 2018-12-24 ENCOUNTER — OUTPATIENT (OUTPATIENT)
Dept: OUTPATIENT SERVICES | Facility: HOSPITAL | Age: 68
LOS: 1 days | Discharge: HOME | End: 2018-12-24

## 2018-12-24 DIAGNOSIS — N18.9 CHRONIC KIDNEY DISEASE, UNSPECIFIED: ICD-10-CM

## 2018-12-31 ENCOUNTER — OUTPATIENT (OUTPATIENT)
Dept: OUTPATIENT SERVICES | Facility: HOSPITAL | Age: 68
LOS: 1 days | Discharge: HOME | End: 2018-12-31

## 2018-12-31 DIAGNOSIS — N18.9 CHRONIC KIDNEY DISEASE, UNSPECIFIED: ICD-10-CM

## 2019-01-07 ENCOUNTER — OUTPATIENT (OUTPATIENT)
Dept: OUTPATIENT SERVICES | Facility: HOSPITAL | Age: 69
LOS: 1 days | Discharge: HOME | End: 2019-01-07

## 2019-01-07 DIAGNOSIS — N18.9 CHRONIC KIDNEY DISEASE, UNSPECIFIED: ICD-10-CM

## 2019-02-04 ENCOUNTER — OUTPATIENT (OUTPATIENT)
Dept: OUTPATIENT SERVICES | Facility: HOSPITAL | Age: 69
LOS: 1 days | Discharge: HOME | End: 2019-02-04

## 2019-02-04 DIAGNOSIS — N18.9 CHRONIC KIDNEY DISEASE, UNSPECIFIED: ICD-10-CM

## 2019-03-04 ENCOUNTER — OUTPATIENT (OUTPATIENT)
Dept: OUTPATIENT SERVICES | Facility: HOSPITAL | Age: 69
LOS: 1 days | Discharge: HOME | End: 2019-03-04

## 2019-03-04 DIAGNOSIS — N18.9 CHRONIC KIDNEY DISEASE, UNSPECIFIED: ICD-10-CM

## 2019-03-22 ENCOUNTER — INPATIENT (INPATIENT)
Facility: HOSPITAL | Age: 69
LOS: 3 days | Discharge: HOME | End: 2019-03-26
Attending: INTERNAL MEDICINE | Admitting: INTERNAL MEDICINE

## 2019-03-22 VITALS
SYSTOLIC BLOOD PRESSURE: 99 MMHG | TEMPERATURE: 96 F | HEART RATE: 75 BPM | DIASTOLIC BLOOD PRESSURE: 52 MMHG | RESPIRATION RATE: 18 BRPM | OXYGEN SATURATION: 96 %

## 2019-03-22 LAB
ALBUMIN SERPL ELPH-MCNC: 4 G/DL — SIGNIFICANT CHANGE UP (ref 3.5–5.2)
ALP SERPL-CCNC: 52 U/L — SIGNIFICANT CHANGE UP (ref 30–115)
ALT FLD-CCNC: 21 U/L — SIGNIFICANT CHANGE UP (ref 0–41)
ANION GAP SERPL CALC-SCNC: 14 MMOL/L — SIGNIFICANT CHANGE UP (ref 7–14)
APPEARANCE UR: ABNORMAL
APTT BLD: 29.5 SEC — SIGNIFICANT CHANGE UP (ref 27–39.2)
APTT BLD: 70.5 SEC — CRITICAL HIGH (ref 27–39.2)
AST SERPL-CCNC: 40 U/L — SIGNIFICANT CHANGE UP (ref 0–41)
BASE EXCESS BLDV CALC-SCNC: 0.9 MMOL/L — SIGNIFICANT CHANGE UP (ref -2–2)
BASOPHILS # BLD AUTO: 0.02 K/UL — SIGNIFICANT CHANGE UP (ref 0–0.2)
BASOPHILS NFR BLD AUTO: 0.4 % — SIGNIFICANT CHANGE UP (ref 0–1)
BILIRUB SERPL-MCNC: 0.3 MG/DL — SIGNIFICANT CHANGE UP (ref 0.2–1.2)
BILIRUB UR-MCNC: NEGATIVE — SIGNIFICANT CHANGE UP
BUN SERPL-MCNC: 55 MG/DL — HIGH (ref 10–20)
CA-I SERPL-SCNC: 1.26 MMOL/L — SIGNIFICANT CHANGE UP (ref 1.12–1.3)
CALCIUM SERPL-MCNC: 9.8 MG/DL — SIGNIFICANT CHANGE UP (ref 8.5–10.1)
CHLORIDE SERPL-SCNC: 100 MMOL/L — SIGNIFICANT CHANGE UP (ref 98–110)
CO2 SERPL-SCNC: 21 MMOL/L — SIGNIFICANT CHANGE UP (ref 17–32)
COLOR SPEC: YELLOW — SIGNIFICANT CHANGE UP
CREAT SERPL-MCNC: 4.2 MG/DL — CRITICAL HIGH (ref 0.7–1.5)
DIFF PNL FLD: NEGATIVE — SIGNIFICANT CHANGE UP
EOSINOPHIL # BLD AUTO: 0.44 K/UL — SIGNIFICANT CHANGE UP (ref 0–0.7)
EOSINOPHIL NFR BLD AUTO: 8.7 % — HIGH (ref 0–8)
EPI CELLS # UR: ABNORMAL /HPF
GAS PNL BLDV: 138 MMOL/L — SIGNIFICANT CHANGE UP (ref 136–145)
GAS PNL BLDV: SIGNIFICANT CHANGE UP
GLUCOSE BLDC GLUCOMTR-MCNC: 104 MG/DL — HIGH (ref 70–99)
GLUCOSE SERPL-MCNC: 94 MG/DL — SIGNIFICANT CHANGE UP (ref 70–99)
GLUCOSE UR QL: NEGATIVE MG/DL — SIGNIFICANT CHANGE UP
HCO3 BLDV-SCNC: 27 MMOL/L — SIGNIFICANT CHANGE UP (ref 22–29)
HCT VFR BLD CALC: 29.3 % — LOW (ref 37–47)
HCT VFR BLDA CALC: 33.1 % — LOW (ref 34–44)
HGB BLD CALC-MCNC: 10.8 G/DL — LOW (ref 14–18)
HGB BLD-MCNC: 9.5 G/DL — LOW (ref 12–16)
IMM GRANULOCYTES NFR BLD AUTO: 0.2 % — SIGNIFICANT CHANGE UP (ref 0.1–0.3)
INR BLD: 0.91 RATIO — SIGNIFICANT CHANGE UP (ref 0.65–1.3)
KETONES UR-MCNC: NEGATIVE — SIGNIFICANT CHANGE UP
LACTATE BLDV-MCNC: 1.1 MMOL/L — SIGNIFICANT CHANGE UP (ref 0.5–1.6)
LEUKOCYTE ESTERASE UR-ACNC: NEGATIVE — SIGNIFICANT CHANGE UP
LYMPHOCYTES # BLD AUTO: 1.48 K/UL — SIGNIFICANT CHANGE UP (ref 1.2–3.4)
LYMPHOCYTES # BLD AUTO: 29.3 % — SIGNIFICANT CHANGE UP (ref 20.5–51.1)
MCHC RBC-ENTMCNC: 31.5 PG — HIGH (ref 27–31)
MCHC RBC-ENTMCNC: 32.4 G/DL — SIGNIFICANT CHANGE UP (ref 32–37)
MCV RBC AUTO: 97 FL — SIGNIFICANT CHANGE UP (ref 81–99)
MONOCYTES # BLD AUTO: 0.44 K/UL — SIGNIFICANT CHANGE UP (ref 0.1–0.6)
MONOCYTES NFR BLD AUTO: 8.7 % — SIGNIFICANT CHANGE UP (ref 1.7–9.3)
NEUTROPHILS # BLD AUTO: 2.66 K/UL — SIGNIFICANT CHANGE UP (ref 1.4–6.5)
NEUTROPHILS NFR BLD AUTO: 52.7 % — SIGNIFICANT CHANGE UP (ref 42.2–75.2)
NITRITE UR-MCNC: NEGATIVE — SIGNIFICANT CHANGE UP
NRBC # BLD: 0 /100 WBCS — SIGNIFICANT CHANGE UP (ref 0–0)
PCO2 BLDV: 47 MMHG — SIGNIFICANT CHANGE UP (ref 41–51)
PH BLDV: 7.36 — SIGNIFICANT CHANGE UP (ref 7.26–7.43)
PH UR: 6 — SIGNIFICANT CHANGE UP (ref 5–8)
PLATELET # BLD AUTO: 254 K/UL — SIGNIFICANT CHANGE UP (ref 130–400)
PO2 BLDV: 63 MMHG — HIGH (ref 20–40)
POTASSIUM BLDV-SCNC: 4.8 MMOL/L — SIGNIFICANT CHANGE UP (ref 3.3–5.6)
POTASSIUM SERPL-MCNC: 4.5 MMOL/L — SIGNIFICANT CHANGE UP (ref 3.5–5)
POTASSIUM SERPL-SCNC: 4.5 MMOL/L — SIGNIFICANT CHANGE UP (ref 3.5–5)
PROT SERPL-MCNC: 8.4 G/DL — HIGH (ref 6–8)
PROT UR-MCNC: 100 MG/DL
PROTHROM AB SERPL-ACNC: 10.5 SEC — SIGNIFICANT CHANGE UP (ref 9.95–12.87)
RBC # BLD: 3.02 M/UL — LOW (ref 4.2–5.4)
RBC # FLD: 14.4 % — SIGNIFICANT CHANGE UP (ref 11.5–14.5)
SAO2 % BLDV: 90 % — SIGNIFICANT CHANGE UP
SODIUM SERPL-SCNC: 135 MMOL/L — SIGNIFICANT CHANGE UP (ref 135–146)
SP GR SPEC: 1.01 — SIGNIFICANT CHANGE UP (ref 1.01–1.03)
TROPONIN T SERPL-MCNC: 1.31 NG/ML — CRITICAL HIGH
TROPONIN T SERPL-MCNC: 1.47 NG/ML — CRITICAL HIGH
UROBILINOGEN FLD QL: 0.2 MG/DL — SIGNIFICANT CHANGE UP (ref 0.2–0.2)
WBC # BLD: 5.05 K/UL — SIGNIFICANT CHANGE UP (ref 4.8–10.8)
WBC # FLD AUTO: 5.05 K/UL — SIGNIFICANT CHANGE UP (ref 4.8–10.8)

## 2019-03-22 RX ORDER — HEPARIN SODIUM 5000 [USP'U]/ML
4100 INJECTION INTRAVENOUS; SUBCUTANEOUS EVERY 6 HOURS
Qty: 0 | Refills: 0 | Status: DISCONTINUED | OUTPATIENT
Start: 2019-03-22 | End: 2019-03-24

## 2019-03-22 RX ORDER — METOPROLOL TARTRATE 50 MG
25 TABLET ORAL ONCE
Qty: 0 | Refills: 0 | Status: COMPLETED | OUTPATIENT
Start: 2019-03-22 | End: 2019-03-22

## 2019-03-22 RX ORDER — CHOLECALCIFEROL (VITAMIN D3) 125 MCG
1 CAPSULE ORAL
Qty: 0 | Refills: 0 | COMMUNITY

## 2019-03-22 RX ORDER — ASPIRIN/CALCIUM CARB/MAGNESIUM 324 MG
324 TABLET ORAL ONCE
Qty: 0 | Refills: 0 | Status: COMPLETED | OUTPATIENT
Start: 2019-03-22 | End: 2019-03-22

## 2019-03-22 RX ORDER — CHOLECALCIFEROL (VITAMIN D3) 125 MCG
2000 CAPSULE ORAL DAILY
Qty: 0 | Refills: 0 | Status: DISCONTINUED | OUTPATIENT
Start: 2019-03-22 | End: 2019-03-26

## 2019-03-22 RX ORDER — DEXTROSE 50 % IN WATER 50 %
12.5 SYRINGE (ML) INTRAVENOUS ONCE
Qty: 0 | Refills: 0 | Status: DISCONTINUED | OUTPATIENT
Start: 2019-03-22 | End: 2019-03-26

## 2019-03-22 RX ORDER — FERROUS SULFATE 325(65) MG
325 TABLET ORAL
Qty: 0 | Refills: 0 | Status: DISCONTINUED | OUTPATIENT
Start: 2019-03-22 | End: 2019-03-26

## 2019-03-22 RX ORDER — DEXTROSE 50 % IN WATER 50 %
25 SYRINGE (ML) INTRAVENOUS ONCE
Qty: 0 | Refills: 0 | Status: DISCONTINUED | OUTPATIENT
Start: 2019-03-22 | End: 2019-03-26

## 2019-03-22 RX ORDER — ASPIRIN/CALCIUM CARB/MAGNESIUM 324 MG
81 TABLET ORAL DAILY
Qty: 0 | Refills: 0 | Status: DISCONTINUED | OUTPATIENT
Start: 2019-03-22 | End: 2019-03-26

## 2019-03-22 RX ORDER — CITALOPRAM 10 MG/1
1 TABLET, FILM COATED ORAL
Qty: 0 | Refills: 0 | COMMUNITY

## 2019-03-22 RX ORDER — GLUCAGON INJECTION, SOLUTION 0.5 MG/.1ML
1 INJECTION, SOLUTION SUBCUTANEOUS ONCE
Qty: 0 | Refills: 0 | Status: DISCONTINUED | OUTPATIENT
Start: 2019-03-22 | End: 2019-03-26

## 2019-03-22 RX ORDER — DEXTROSE 50 % IN WATER 50 %
15 SYRINGE (ML) INTRAVENOUS ONCE
Qty: 0 | Refills: 0 | Status: DISCONTINUED | OUTPATIENT
Start: 2019-03-22 | End: 2019-03-26

## 2019-03-22 RX ORDER — NIFEDIPINE 30 MG
60 TABLET, EXTENDED RELEASE 24 HR ORAL DAILY
Qty: 0 | Refills: 0 | Status: DISCONTINUED | OUTPATIENT
Start: 2019-03-22 | End: 2019-03-24

## 2019-03-22 RX ORDER — HEPARIN SODIUM 5000 [USP'U]/ML
INJECTION INTRAVENOUS; SUBCUTANEOUS
Qty: 25000 | Refills: 0 | Status: DISCONTINUED | OUTPATIENT
Start: 2019-03-22 | End: 2019-03-23

## 2019-03-22 RX ORDER — ATORVASTATIN CALCIUM 80 MG/1
80 TABLET, FILM COATED ORAL AT BEDTIME
Qty: 0 | Refills: 0 | Status: DISCONTINUED | OUTPATIENT
Start: 2019-03-22 | End: 2019-03-26

## 2019-03-22 RX ORDER — HYDRALAZINE HCL 50 MG
75 TABLET ORAL
Qty: 0 | Refills: 0 | Status: DISCONTINUED | OUTPATIENT
Start: 2019-03-22 | End: 2019-03-26

## 2019-03-22 RX ORDER — FERROUS SULFATE 325(65) MG
1 TABLET ORAL
Qty: 0 | Refills: 0 | COMMUNITY

## 2019-03-22 RX ORDER — INSULIN GLARGINE 100 [IU]/ML
17 INJECTION, SOLUTION SUBCUTANEOUS AT BEDTIME
Qty: 0 | Refills: 0 | Status: DISCONTINUED | OUTPATIENT
Start: 2019-03-22 | End: 2019-03-24

## 2019-03-22 RX ORDER — SODIUM CHLORIDE 9 MG/ML
1000 INJECTION, SOLUTION INTRAVENOUS
Qty: 0 | Refills: 0 | Status: DISCONTINUED | OUTPATIENT
Start: 2019-03-22 | End: 2019-03-26

## 2019-03-22 RX ORDER — INSULIN LISPRO 100/ML
6 VIAL (ML) SUBCUTANEOUS
Qty: 0 | Refills: 0 | Status: DISCONTINUED | OUTPATIENT
Start: 2019-03-22 | End: 2019-03-24

## 2019-03-22 RX ORDER — HEPARIN SODIUM 5000 [USP'U]/ML
4100 INJECTION INTRAVENOUS; SUBCUTANEOUS ONCE
Qty: 0 | Refills: 0 | Status: COMPLETED | OUTPATIENT
Start: 2019-03-22 | End: 2019-03-22

## 2019-03-22 RX ORDER — LABETALOL HCL 100 MG
100 TABLET ORAL EVERY 12 HOURS
Qty: 0 | Refills: 0 | Status: DISCONTINUED | OUTPATIENT
Start: 2019-03-22 | End: 2019-03-24

## 2019-03-22 RX ORDER — OLANZAPINE 15 MG/1
1 TABLET, FILM COATED ORAL
Qty: 0 | Refills: 0 | COMMUNITY

## 2019-03-22 RX ORDER — CLOPIDOGREL BISULFATE 75 MG/1
300 TABLET, FILM COATED ORAL ONCE
Qty: 0 | Refills: 0 | Status: COMPLETED | OUTPATIENT
Start: 2019-03-22 | End: 2019-03-22

## 2019-03-22 RX ADMIN — HEPARIN SODIUM 4100 UNIT(S): 5000 INJECTION INTRAVENOUS; SUBCUTANEOUS at 18:25

## 2019-03-22 RX ADMIN — HEPARIN SODIUM 800 UNIT(S)/HR: 5000 INJECTION INTRAVENOUS; SUBCUTANEOUS at 18:25

## 2019-03-22 RX ADMIN — CLOPIDOGREL BISULFATE 300 MILLIGRAM(S): 75 TABLET, FILM COATED ORAL at 18:31

## 2019-03-22 RX ADMIN — Medication 324 MILLIGRAM(S): at 15:28

## 2019-03-22 RX ADMIN — Medication 25 MILLIGRAM(S): at 18:31

## 2019-03-22 RX ADMIN — ATORVASTATIN CALCIUM 80 MILLIGRAM(S): 80 TABLET, FILM COATED ORAL at 23:16

## 2019-03-22 NOTE — ED ADULT NURSE NOTE - NSIMPLEMENTINTERV_GEN_ALL_ED
Implemented All Fall Risk Interventions:  [place] to call system. Call bell, personal items and telephone within reach. Instruct patient to call for assistance. Room bathroom lighting operational. Non-slip footwear when patient is off stretcher. Physically safe environment: no spills, clutter or unnecessary equipment. Stretcher in lowest position, wheels locked, appropriate side rails in place. Provide visual cue, wrist band, yellow gown, etc. Monitor gait and stability. Monitor for mental status changes and reorient to person, place, and time. Review medications for side effects contributing to fall risk. Reinforce activity limits and safety measures with patient and family. Specific interventions were implemented:

## 2019-03-22 NOTE — PROGRESS NOTE ADULT - SUBJECTIVE AND OBJECTIVE BOX
Admit Note    Pt seen in ED, chart reviewed  spoke to ED attending earlier and to [place] RN earlier  Was letharigic and hypotensive with systolic in 70-80 range in [place].  Sent to ED  ED w/u has shown NSTEMI with elevated trops.  Known hx of Dementia, HTN and CKD being followed by Renal Dr Byrd group  On IV heparin in ED  seen by Cardiology        MEDICATIONS  (STANDING):  heparin  Infusion.  Unit(s)/Hr (8 mL/Hr) IV Continuous <Continuous>    MEDICATIONS  (PRN):  heparin  Injectable 4100 Unit(s) IV Push every 6 hours PRN For aPTT less than 40      Vital Signs Last 24 Hrs  T(C): 36.2 (22 Mar 2019 18:26), Max: 36.7 (22 Mar 2019 15:45)  T(F): 97.1 (22 Mar 2019 18:26), Max: 98.1 (22 Mar 2019 15:45)  HR: 87 (22 Mar 2019 18:26) (75 - 87)  BP: 121/61 (22 Mar 2019 18:26) (99/52 - 128/62)  BP(mean): --  RR: 18 (22 Mar 2019 18:26) (18 - 18)  SpO2: 98% (22 Mar 2019 18:26) (96% - 98%)    PHYSICAL EXAM:    Constitutional: NAD,, well-developed  HEENT: PERRLA, EOMI, Normal Hearing, MMM  Neck: No LAD, No JVD  Back: Normal spine flexure, No CVA tenderness  Respiratory: CTAB/L  Cardiovascular: S1 and S2, RRR, no M/G/R  Gastrointestinal: BS+, soft, NT/ND  Extremities: No peripheral edema  Vascular: 2+ peripheral pulses  Neurological: A/O x 3, no focal deficits    LABS:                        9.5    5.05  )-----------( 254      ( 22 Mar 2019 12:56 )             29.3     03-22    135  |  100  |  55<H>  ----------------------------<  94  4.5   |  21  |  4.2<HH>    Ca    9.8      22 Mar 2019 12:56    TPro  8.4<H>  /  Alb  4.0  /  TBili  0.3  /  DBili  x   /  AST  40  /  ALT  21  /  AlkPhos  52  03-    PT/INR - ( 22 Mar 2019 12:56 )   PT: 10.50 sec;   INR: 0.91 ratio         PTT - ( 22 Mar 2019 12:56 )  PTT:29.5 sec  Urinalysis Basic - ( 22 Mar 2019 14:04 )    Color: Yellow / Appearance: Cloudy / S.015 / pH: x  Gluc: x / Ketone: Negative  / Bili: Negative / Urobili: 0.2 mg/dL   Blood: x / Protein: 100 mg/dL / Nitrite: Negative   Leuk Esterase: Negative / RBC: x / WBC x   Sq Epi: x / Non Sq Epi: Occasional /HPF / Bacteria: x      Drug Screen Urine:  Alcohol Level n/a        RADIOLOGY & ADDITIONAL STUDIES:  reviewed in PACS

## 2019-03-22 NOTE — CONSULT NOTE ADULT - SUBJECTIVE AND OBJECTIVE BOX
HPI:  The patient is a 68-year-old female with a PMH of HTN, DLD, type II DM, CKD stage V, Alzheimer dementia, and depression who presented from an assisted living facility with altered mental status.  As per ED chart, the staff at her facility noticed her to have AMS as well as a BP of "80/50."  In the ED, the patient was noted to have a troponin level of 1.31.  She is a very poor historian so further information was unable to be obtained.    PAST MEDICAL & SURGICAL HISTORY  Depression, unspecified depression type  Alzheimer disease  Diabetes  HTN (hypertension)  High cholesterol  No significant past surgical history    FAMILY HISTORY:  FAMILY HISTORY:  No pertinent family history in first degree relatives    ALLERGIES:  No Known Allergies    HOME MEDICATIONS:  Home Medications:  CeleXA 20 mg oral tablet: 1 tab(s) orally once a day (07 Sep 2018 16:21)  ZyPREXA 10 mg oral tablet: 1 tab(s) orally once a day (07 Sep 2018 16:22)    VITALS:   T(F): 98.1 (03-22 @ 15:45), Max: 98.1 (03-22 @ 15:45)  HR: 78 (03-22 @ 15:45) (75 - 78)  BP: 128/62 (03-22 @ 15:45) (99/52 - 128/62)  BP(mean): --  RR: 18 (03-22 @ 15:45) (18 - 18)  SpO2: 98% (03-22 @ 15:45) (96% - 98%)    REVIEW OF SYSTEMS:  Unable to be obtained.    PHYSICAL EXAM:  NEURO: awake and alert; AAO x1 (oriented to person)  GEN: Not in acute distress  NECK: supple  LUNGS: Clear to auscultation bilaterally   CARDIOVASCULAR: S1/S2 present, RRR  ABD: Soft, non-tender  EXT: No lower extremity edema  SKIN: Intact    LABS:                        9.5    5.05  )-----------( 254      ( 22 Mar 2019 12:56 )             29.3     03-22    135  |  100  |  55<H>  ----------------------------<  94  4.5   |  21  |  4.2<HH>    Ca    9.8      22 Mar 2019 12:56    TPro  8.4<H>  /  Alb  4.0  /  TBili  0.3  /  DBili  x   /  AST  40  /  ALT  21  /  AlkPhos  52  03-22    PT/INR - ( 22 Mar 2019 12:56 )   PT: 10.50 sec;   INR: 0.91 ratio    PTT - ( 22 Mar 2019 12:56 )  PTT:29.5 sec  Troponin T, Serum: 1.31 ng/mL <HH> (03-22-19 @ 12:56)    RADIOLOGY:  -CXR: no evidence of acute cardiopulmonary disease    ECG: HPI:  The patient is a 68-year-old female with a PMH of HTN, DLD, type II DM, CKD stage V, Alzheimer dementia, and depression who presented from an assisted living facility with altered mental status.  As per ED chart, the staff at her facility noticed her to have AMS as well as a BP of "80/50."  In the ED, the patient was noted to have a troponin level of 1.31.  She is a very poor historian so further information was unable to be obtained.    PAST MEDICAL & SURGICAL HISTORY  Depression, unspecified depression type  Alzheimer disease  Diabetes  HTN (hypertension)  High cholesterol  No significant past surgical history    FAMILY HISTORY:  FAMILY HISTORY:  No pertinent family history in first degree relatives    ALLERGIES:  No Known Allergies    HOME MEDICATIONS:  Home Medications:  CeleXA 20 mg oral tablet: 1 tab(s) orally once a day (07 Sep 2018 16:21)  ZyPREXA 10 mg oral tablet: 1 tab(s) orally once a day (07 Sep 2018 16:22)    VITALS:   T(F): 98.1 (03-22 @ 15:45), Max: 98.1 (03-22 @ 15:45)  HR: 78 (03-22 @ 15:45) (75 - 78)  BP: 128/62 (03-22 @ 15:45) (99/52 - 128/62)  BP(mean): --  RR: 18 (03-22 @ 15:45) (18 - 18)  SpO2: 98% (03-22 @ 15:45) (96% - 98%)    REVIEW OF SYSTEMS:  Unable to be obtained.    PHYSICAL EXAM:  NEURO: awake and alert; AAO x1 (oriented to person)  GEN: Not in acute distress  NECK: supple  LUNGS: Clear to auscultation bilaterally   CARDIOVASCULAR: S1/S2 present, RRR  ABD: Soft, non-tender  EXT: No lower extremity edema  SKIN: Intact    LABS:                        9.5    5.05  )-----------( 254      ( 22 Mar 2019 12:56 )             29.3     03-22    135  |  100  |  55<H>  ----------------------------<  94  4.5   |  21  |  4.2<HH>    Ca    9.8      22 Mar 2019 12:56    TPro  8.4<H>  /  Alb  4.0  /  TBili  0.3  /  DBili  x   /  AST  40  /  ALT  21  /  AlkPhos  52  03-22    PT/INR - ( 22 Mar 2019 12:56 )   PT: 10.50 sec;   INR: 0.91 ratio    PTT - ( 22 Mar 2019 12:56 )  PTT:29.5 sec  Troponin T, Serum: 1.31 ng/mL <HH> (03-22-19 @ 12:56)    RADIOLOGY:  -CXR: no evidence of acute cardiopulmonary disease    ECG: NSR with T-wave inversion in leads III and aVF

## 2019-03-22 NOTE — H&P ADULT - NSHPPHYSICALEXAM_GEN_ALL_CORE
Vital Signs Last 24 Hrs  T(C): 36.2 (22 Mar 2019 18:26), Max: 36.7 (22 Mar 2019 15:45)  T(F): 97.1 (22 Mar 2019 18:26), Max: 98.1 (22 Mar 2019 15:45)  HR: 87 (22 Mar 2019 18:26) (75 - 87)  BP: 121/61 (22 Mar 2019 18:26) (99/52 - 128/62)  BP(mean): --  RR: 18 (22 Mar 2019 18:26) (18 - 18)  SpO2: 98% (22 Mar 2019 18:26) (96% - 98%)    General: NAD  HEENT: NCAT, EOMI, PERRLA  Cardiac: S1 S2, RRR  Lungs: CTAB, GBAE  Abd/: +BS, soft, NTND  Ext/MSK: +2 PP b/l, -ve LLE b/l  Skin: warm and dry  Psych: dementia  Neuro: AAOx1, no focal deficits

## 2019-03-22 NOTE — H&P ADULT - HISTORY OF PRESENT ILLNESS
68 year old female with a past medical history of HTN, DLD, DM II, CKD V, Alzheimer's dementia and Depression presenting from an Assisted Living Facility with altered mental status and a blood pressure of 80/50. 68 year old female with a past medical history of HTN, DLD, DM II, CKD V, Alzheimer's dementia and Depression presenting from an Assisted Living Facility with altered mental status and a blood pressure of 80/50. Nurse Lobo at [facility] reports that the patient has been more confused for the past 3-4 days; unable to explain details behind confusion apart from "something is wrong". Denies other symptoms per nurse. Patient poor historian, unable to provide further information.

## 2019-03-22 NOTE — H&P ADULT - ASSESSMENT
68 year old female with a past medical history of HTN, DLD, DM II, CKD V, Alzheimer's dementia and Depression presenting from an Assisted Living Facility with altered mental status and a blood pressure of 80/50.    NSTEMI; AMS with elevated Troponin  - VS stable currently. Clinically and hemodynamically stable.  - s/p heparin bolus 4100U, , Clopidogrel 300, Metoprolol 25, Heparin drip 800U/hr per Cardio  - EKG TWI in III & aVF  - Resume ASA 81, Plavix 75, resume Labetalol 100 bid, Atorvastatin 40 to 80  - F/u Troponin 8p, 11:30p, 4:30a  - F/u PTT 8p, 11:30p, 4:30a  - TTE pending  - F/u with Cardio if will go for cath in the AM; patient has a very low GFR as a precaution  - Monitor in Tele  - F/u urine and blood cx; mental status at baseline AAOx1    New-onset Afib  - [id number]  - VS stable. Clinically and hemodynamically stable.  - HR controlled. Will resume Labetalol 100 bid.  - Resume Heparin drip & f/u PTT  - TSH in AM, TTE pending  - Monitor in Tele    Normo/Macrocytic anemia  - f/u iron studies, vitamin b12, folate    HTN; will change procardia XL to 60 1 tab daily instead of 2 tabs. On hydralazine 75mg tid and Labetalol 100 bid. Will hold Lasix.    HLD; Atorvastatin 40 to 80    DM II; fsg and insulin protocol    CKD V; stable, avoid nephrotoxic drugs, hold Lasix in view of Hypotension. Resume cholecalciferol 2000U daily. PTH, phosphorus, vitamin D25, urine protein/crea. Nephrology consult.    Medications confirmed with [facility]  DVT ppx; heparin drip  Diet; carb consistent, DASH, low sodium  Ambulate as tolerated, fall precautions 68 year old female with a past medical history of HTN, DLD, DM II, CKD V, Alzheimer's dementia and Depression presenting from an Assisted Living Facility with altered mental status and a blood pressure of 80/50.    NSTEMI; AMS with elevated Troponin  - VS stable currently. Clinically and hemodynamically stable.  - s/p heparin bolus 4100U, , Clopidogrel 300, Metoprolol 25, Heparin drip 800U/hr per Cardio  - EKG TWI in III & aVF  - Resume ASA 81, Plavix 75, resume Labetalol 100 bid, Atorvastatin 40 to 80  - F/u Troponin 8p, 11:30p, 4:30a  - F/u PTT 8p, 11:30p, 4:30a  - TTE pending  - F/u with Cardio if will go for cath in the AM; patient has a very low GFR as a precaution  - Monitor in Tele  - F/u urine and blood cx; mental status at baseline AAOx1    New-onset Afib  - [id number]  - VS stable. Clinically and hemodynamically stable.  - HR controlled. Will resume Labetalol 100 bid.  - Resume Heparin drip & f/u PTT  - TSH in AM, TTE pending  - Monitor in Tele    Normo/Macrocytic anemia  - f/u iron studies, vitamin b12, folate    HTN; will change procardia XL to 60 1 tab daily instead of 2 tabs. On hydralazine 75mg tid and Labetalol 100 bid. Will hold Lasix.    HLD; Atorvastatin 40 to 80    DM II; fsg and insulin protocol    CKD V; stable baseline crea 4, avoid nephrotoxic drugs, hold Lasix in view of Hypotension. Resume cholecalciferol 2000U daily. PTH, phosphorus, vitamin D25, urine protein/crea. Nephrology consult.    Medications confirmed with [facility]  DVT ppx; heparin drip  Diet; carb consistent, DASH, low sodium  Ambulate as tolerated, fall precautions

## 2019-03-22 NOTE — ED ADULT NURSE NOTE - INTERVENTIONS DEFINITIONS
Non-slip footwear when patient is off stretcher/Provide visual cue, wrist band, yellow gown, etc./Monitor for mental status changes and reorient to person, place, and time/Stretcher in lowest position, wheels locked, appropriate side rails in place/Call bell, personal items and telephone within reach/Provide visual clues: red socks

## 2019-03-22 NOTE — CONSULT NOTE ADULT - ASSESSMENT
The patient is a 68-year-old female with a PMH of HTN, DLD, type II DM, CKD stage V, Alzheimer dementia, and depression who presented from an assisted living facility with altered mental status.  As per ED chart, the staff at her facility noticed her to have AMS as well as a BP of "80/50."  In the ED, the patient was noted to have a troponin level of 1.31.  She is a very poor historian so further information was unable to be obtained. The patient is a 68-year-old female with a PMH of HTN, DLD, type II DM, CKD stage V, Alzheimer dementia, and depression who presented from an assisted living facility with altered mental status.  As per ED chart, the staff at her facility noticed her to have AMS as well as a BP of "80/50."  In the ED, the patient was noted to have a troponin level of 1.31.  She is a very poor historian so further information was unable to be obtained.    1. NSTEMI  - EKG: T-wave inversions in leads III and aVF  - troponin: 1.31  - check repeat troponin level  - start ASA and Plavix after loading doses today  - start heparin infusion (ACS protocol); monitor PTT levels  - start low-dose beta-blocker and continue statin    2. HTN  - BP improved now  - recommend adjusting Procardia XL dose to 60 mg daily and monitor BP The patient is a 68-year-old female with a PMH of HTN, DLD, type II DM, CKD stage V, Alzheimer dementia, and depression who presented from an assisted living facility with altered mental status.  As per ED chart, the staff at her facility noticed her to have AMS as well as a BP of "80/50."  In the ED, the patient was noted to have a troponin level of 1.31.  She is a very poor historian so further information was unable to be obtained.  She was noted to have atrial fibrillation on her telemetry monitor.    1. NSTEMI  - EKG: T-wave inversions in leads III and aVF  - troponin: 1.31  - check repeat troponin level  - start ASA and Plavix after loading doses today  - start heparin infusion; monitor PTT levels  - start low-dose beta-blocker and continue statin    2. New-onset atrial fibrillation  - HR is currently controlled; start low-dose beta-blocker  - CHADsVASc score is 4  - continue anticoagulation with heparin infusion  - check TSH level and 2D echo    3. HTN  - BP improved now  - recommend adjusting Procardia XL dose to 60 mg daily and monitor BP

## 2019-03-22 NOTE — ED ADULT NURSE REASSESSMENT NOTE - NS ED NURSE REASSESS COMMENT FT1
Pts troponin elevated, pt placed on cardiac monitor. Pt continue to be pleasantly confused, fall precautions maintained. pt in no acute distress, pt unable to express any pain,. Pts troponin elevated, pt placed on cardiac monitor. Pt straight cath for urine as per md santos orders, pt tolerated it well, no ecchymosis or skin breakdown noted. Pt continue to be pleasantly confused, fall precautions maintained. pt in no acute distress, pt unable to express any pain,. Pts troponin elevated, pt placed on cardiac monitor. Pt straight cath for urine as per md santos orders, pt tolerated it well, no ecchymosis or skin breakdown noted. Pt continue to be pleasantly confused, fall precautions maintained. attempted to use video  phone for Polish with no success. pt in no acute distress, pt unable to express any pain,.

## 2019-03-22 NOTE — H&P ADULT - NSICDXPASTMEDICALHX_GEN_ALL_CORE_FT
PAST MEDICAL HISTORY:  Alzheimer disease     Depression, unspecified depression type     Diabetes     High cholesterol     HTN (hypertension)

## 2019-03-22 NOTE — ED PROVIDER NOTE - PHYSICAL EXAMINATION
CONSTITUTIONAL: Elderly  SKIN: warm, dry  HEAD: Normocephalic; atraumatic.  EYES: no conjunctival injection. PERRL.   ENT: No nasal discharge; airway clear.  NECK: Supple; non tender.  CARD: S1, S2 normal; no murmurs, gallops, or rubs. Regular rate and rhythm.   RESP: No wheezes, rales or rhonchi.  ABD: soft ntnd  EXT: Normal ROM.  No clubbing, cyanosis or edema.   LYMPH: No acute cervical adenopathy.  PSYCH: Confusion, AOx1 (person only)

## 2019-03-22 NOTE — H&P ADULT - NSHPLABSRESULTS_GEN_ALL_CORE
Labs                        9.5    5.05  )-----------( 254      ( 22 Mar 2019 12:56 )             29.3         135  |  100  |  55<H>  ----------------------------<  94  4.5   |  21  |  4.2<HH>  Ca    9.8      22 Mar 2019 12:56  TPro  8.4<H>  /  Alb  4.0  /  TBili  0.3  /  DBili  x   /  AST  40  /  ALT  21  /  AlkPhos  52    PT/INR - ( 22 Mar 2019 12:56 )   PT: 10.50 sec;   INR: 0.91 ratio     PTT - ( 22 Mar 2019 12:56 )  PTT:29.5 sec    Blood Gas Venous - Lactate (19 @ 14:12)    Blood Gas Venous - Lactate: 1.1 mmoL/L    Troponin T, Serum (19 @ 12:56)    Troponin T, Serum: 1.31: TYPE:(C=Critical, N=Notification, A=Abnormal) c    Urinalysis Basic - ( 22 Mar 2019 14:04 )    Color: Yellow / Appearance: Cloudy / S.015 / pH: x  Gluc: x / Ketone: Negative  / Bili: Negative / Urobili: 0.2 mg/dL   Blood: x / Protein: 100 mg/dL / Nitrite: Negative   Leuk Esterase: Negative / RBC: x / WBC x   Sq Epi: x / Non Sq Epi: Occasional /HPF / Bacteria: x    Blood Gas Profile - Venous (19 @ 14:12)    pH, Venous: 7.36    pCO2, Venous: 47 mmHg    pO2, Venous: 63 mmHg    HCO3, Venous: 27 mmoL/L    Base Excess, Venous: 0.9 mmoL/L    Oxygen Saturation, Venous: 90 %    EKG NSR with T-wave inversion in leads III and aVF    Radio  < from: Xray Chest 1 View-PORTABLE IMMEDIATE (19 @ 15:52) >  Impression:  No radiographic evidence of acute cardiopulmonary disease.  < end of copied text >    < from: CT Head No Cont (19 @ 15:09) >  IMPRESSION:  In comparison with theprior noncontrast CT scan of the brain dated 2018: Slight progression of periventricular white matter and basal ganglia hypodensities consistent with ischemic change, age indeterminate.  No acute intracranial hemorrhage.  < end of copied text >

## 2019-03-22 NOTE — ED PROVIDER NOTE - ATTENDING CONTRIBUTION TO CARE
I am unable to obtain a comprehensive history, review of systems, past medical history, and/or physical exam due to constraints imposed by the urgency of the patient's clinical condition and/or mental status.     68f w a hx of CKD, HLD, DM, & dementia (baseline from prior hosp d/c papers is AAOx1 and repeats her name only). Pt presents from assisted living facility for hypotension w BP 80/50 today. Pt very poor historian due to dementia and unclear if truly understands questions. Pt reports no fever/chills, no URI symptoms, no dyspnea, no vomit/diarrhea, no bleeding, no pain.    Review of Systems  Unable to assess    Physical Exam  General: Awake, alert, NAD, WDWN, non-toxic appearing, NCAT  Eyes: PERRL, EOMI, no icterus, lids and conjunctivae are normal  ENT: External inspection normal, pink/moist membranes, pharynx normal  CV: S1S2, regular rate and rhythm, no murmur/gallops/rubs, no JVD, 2+ pulses b/l, no edema/cords/homans, warm/well-perfused  Respiratory: Normal respiratory rate/effort, no respiratory distress, lungs clear to auscultation b/l, no wheezing/rales/rhonchi, no retractions, no stridor  Abdomen: Soft abdomen, no tender/distended/guarding/rebound, no CVA tender  Musculoskeletal: FROM all 4 extremities, N/V intact  Neck: FROM neck, supple, no meningismus, trachea midline, no JVD  Integumentary: Color normal for race, warm and dry, no rash  Neuro: Oriented x1, repeats her name only but answers simple questions, CN 2-12 intact, normal motor, withdraws to touch in all 4 ext    68f w AMS according to group home? and hypotension today. no hypotension currently, unclear what change is from prior recorded baseline. --Labs, EKG, CXR, CT head, observe/re-assess.

## 2019-03-22 NOTE — ED PROVIDER NOTE - PROGRESS NOTE DETAILS
Discussed case with son and [place]. Discussed EKG/trop results with cardio fellow who will evaluate the pt awaiting completion of cardiology consult Per cardiology aadmit to tele on heparin drip.

## 2019-03-22 NOTE — ED PROVIDER NOTE - OBJECTIVE STATEMENT
67 y/o female with PMH of stage 3 dementia, CKD not on dialysis, HLD, DM, DM who was sent to ED from [facility] for AMS and hypotension. Pt is unable to provide hx due to mental status. Spoke to nurse Lobo At [facility] who states the pt was noted to have low blood pressure, 80/50, increased confusion x3-4 days and pt stated that "something is wrong."

## 2019-03-22 NOTE — CONSULT NOTE ADULT - ATTENDING COMMENTS
Seen / examined yesterday evening.    No cardiac history.  Risk factors include DM, HTN, HLD, CKD.    Notable comorbidities: Dementia.    Presented from NH with AMS (delirium on dementia)?  Baseline not clear.    Patient is alert and pleasant, but confabulating.  Can not obtain history.  No apparent discomfort.  Breathing not visibly labored.  Hemodynamics stable.  AF noted on portable telemetry monitor.  EKG: NSR, inferior infarct.  Initial troponin 1.31    RECOMMEND:  - ASA / Plavix.  - Heparin.  - Metoprolol 12.5mg q6.  - Statin.  - Follow-up EKG / troponin.  - Renal evaluation.    Patient is not a good candidate for cardiac catheterization given poor functional / cognitive status, advanced renal disease, and overall poor prognosis.

## 2019-03-22 NOTE — ED PROVIDER NOTE - CLINICAL SUMMARY MEDICAL DECISION MAKING FREE TEXT BOX
68f w AMS according to group home? and hypotension today. no hypotension currently, unclear what change is from prior recorded baseline. Labs, EKG, & imaging reviewed. Pt found to have NSTEMI. Cardiology consulted and recommending heparin infusion and admit to Tele. Patient admitted for further care and management.

## 2019-03-22 NOTE — ED ADULT NURSE NOTE - OBJECTIVE STATEMENT
Pt presents to ER as poor historian, pt continue to repeat her name over and over, when asked what brings pt to ER, pt spells her name, Pt unable to answer any other questions, when asked closed ended questions pt continue to say no. pts bp repeated, and wdl. pt in no acute respiratory distress. no one at bedside, pt calm, not attempting to jump out of stretcher, able to follow some commands, fall precautions maintained.

## 2019-03-22 NOTE — ED PROVIDER NOTE - CRITICAL CARE PROVIDED
interpretation of diagnostic studies/consultation with other physicians/additional history taking/documentation/telephone consultation with the patient's family/direct patient care (not related to procedure)

## 2019-03-23 LAB
AMMONIA BLD-MCNC: 20 UMOL/L — SIGNIFICANT CHANGE UP (ref 11–55)
APTT BLD: 34.6 SEC — SIGNIFICANT CHANGE UP (ref 27–39.2)
APTT BLD: 67 SEC — HIGH (ref 27–39.2)
APTT BLD: 73.8 SEC — CRITICAL HIGH (ref 27–39.2)
APTT BLD: 74.9 SEC — CRITICAL HIGH (ref 27–39.2)
CULTURE RESULTS: NO GROWTH — SIGNIFICANT CHANGE UP
GLUCOSE BLDC GLUCOMTR-MCNC: 101 MG/DL — HIGH (ref 70–99)
GLUCOSE BLDC GLUCOMTR-MCNC: 96 MG/DL — SIGNIFICANT CHANGE UP (ref 70–99)
GLUCOSE BLDC GLUCOMTR-MCNC: 98 MG/DL — SIGNIFICANT CHANGE UP (ref 70–99)
SPECIMEN SOURCE: SIGNIFICANT CHANGE UP
TROPONIN T SERPL-MCNC: 1.31 NG/ML — CRITICAL HIGH

## 2019-03-23 RX ORDER — LABETALOL HCL 100 MG
10 TABLET ORAL ONCE
Qty: 0 | Refills: 0 | Status: COMPLETED | OUTPATIENT
Start: 2019-03-23 | End: 2019-03-23

## 2019-03-23 RX ORDER — HEPARIN SODIUM 5000 [USP'U]/ML
800 INJECTION INTRAVENOUS; SUBCUTANEOUS
Qty: 25000 | Refills: 0 | Status: DISCONTINUED | OUTPATIENT
Start: 2019-03-23 | End: 2019-03-24

## 2019-03-23 RX ADMIN — Medication 325 MILLIGRAM(S): at 22:30

## 2019-03-23 RX ADMIN — Medication 2 MILLIGRAM(S): at 01:38

## 2019-03-23 RX ADMIN — Medication 10 MILLIGRAM(S): at 17:02

## 2019-03-23 RX ADMIN — Medication 81 MILLIGRAM(S): at 12:13

## 2019-03-23 RX ADMIN — Medication 75 MILLIGRAM(S): at 16:36

## 2019-03-23 RX ADMIN — ATORVASTATIN CALCIUM 80 MILLIGRAM(S): 80 TABLET, FILM COATED ORAL at 22:30

## 2019-03-23 RX ADMIN — Medication 100 MILLIGRAM(S): at 22:30

## 2019-03-23 RX ADMIN — HEPARIN SODIUM 750 UNIT(S)/HR: 5000 INJECTION INTRAVENOUS; SUBCUTANEOUS at 14:53

## 2019-03-23 RX ADMIN — INSULIN GLARGINE 17 UNIT(S): 100 INJECTION, SOLUTION SUBCUTANEOUS at 23:00

## 2019-03-23 RX ADMIN — Medication 2000 UNIT(S): at 12:13

## 2019-03-23 NOTE — CONSULT NOTE ADULT - SUBJECTIVE AND OBJECTIVE BOX
PAST HISTORY  --------------------------------------------------------------------------------  PAST MEDICAL & SURGICAL HISTORY:  Depression, unspecified depression type  Alzheimer disease  Diabetes  HTN (hypertension)  High cholesterol  No significant past surgical history        PAST SOCIAL HISTORY:    ALLERGIES & MEDICATIONS  --------------------------------------------------------------------------------  Allergies    No Known Allergies        Standing Inpatient Medications  aspirin  chewable 81 milliGRAM(s) Oral daily  atorvastatin 80 milliGRAM(s) Oral at bedtime  cholecalciferol 2000 Unit(s) Oral daily  dextrose 5%. 1000 milliLiter(s) IV Continuous <Continuous>  dextrose 50% Injectable 12.5 Gram(s) IV Push once  dextrose 50% Injectable 25 Gram(s) IV Push once  dextrose 50% Injectable 25 Gram(s) IV Push once  ferrous    sulfate 325 milliGRAM(s) Oral two times a day  heparin  Infusion.  Unit(s)/Hr IV Continuous <Continuous>  hydrALAZINE 75 milliGRAM(s) Oral two times a day  insulin glargine Injectable (LANTUS) 17 Unit(s) SubCutaneous at bedtime  insulin lispro Injectable (HumaLOG) 6 Unit(s) SubCutaneous three times a day before meals  labetalol 100 milliGRAM(s) Oral every 12 hours  NIFEdipine XL 60 milliGRAM(s) Oral daily    PRN Inpatient Medications  dextrose 40% Gel 15 Gram(s) Oral once PRN  glucagon  Injectable 1 milliGRAM(s) IntraMuscular once PRN  heparin  Injectable 4100 Unit(s) IV Push every 6 hours PRN            VITALS/PHYSICAL EXAM  --------------------------------------------------------------------------------  T(C): 36.1 (03-23-19 @ 06:44), Max: 36.7 (03-22-19 @ 15:45)  HR: 77 (03-23-19 @ 06:44) (75 - 87)  BP: 169/80 (03-23-19 @ 06:44) (99/52 - 169/80)  RR: 18 (03-23-19 @ 06:44) (18 - 18)  SpO2: 98% (03-23-19 @ 06:44) (96% - 98%)  Wt(kg): --    Weight (kg): 70 (03-22-19 @ 15:45)      Physical Exam:  	Gen: NAD, confused   	Pulm: CTA B/L  	CV:  S1S2; no rub  	Abd: +distended  	LE: no edema      LABS/STUDIES  --------------------------------------------------------------------------------              9.5    5.05  >-----------<  254      [03-22-19 @ 12:56]              29.3     135  |  100  |  55  ----------------------------<  94      [03-22-19 @ 12:56]  4.5   |  21  |  4.2        Ca     9.8     [03-22-19 @ 12:56]    TPro  8.4  /  Alb  4.0  /  TBili  0.3  /  DBili  x   /  AST  40  /  ALT  21  /  AlkPhos  52  [03-22-19 @ 12:56]    PT/INR: PT 10.50, INR 0.91       [03-22-19 @ 12:56]  PTT: 73.8       [03-23-19 @ 00:18]    Troponin 1.31      [03-23-19 @ 00:18]    Creatinine Trend:  SCr 4.2 [03-22 @ 12:56]    Urinalysis - [03-22-19 @ 14:04]      Color Yellow / Appearance Cloudy / SG 1.015 / pH 6.0      Gluc Negative / Ketone Negative  / Bili Negative / Urobili 0.2       Blood Negative / Protein 100 / Leuk Est Negative / Nitrite Negative      RBC  / WBC  / Hyaline  / Gran  / Sq Epi  / Non Sq Epi Occasional / Bacteria       Iron 47, TIBC 200, %sat 24      [09-08-18 @ 08:49]  Ferritin 195      [09-08-18 @ 08:49]  PTH -- (Ca 9.9)      [09-08-18 @ 08:49]   281  TSH 1.64      [09-08-18 @ 08:49]  Lipid: chol 188, , HDL 80, LDL 86      [09-08-18 @ 08:49] history from the chart   patient confused   68 year old female with a past medical history of HTN, DLD, DM II, CKD V, Alzheimer's dementia and Depression presenting from an Assisted Living Facility with altered mental status and a blood pressure of 80/50.   when seen patient in no distress  confused , agitated  PAST HISTORY  --------------------------------------------------------------------------------  PAST MEDICAL & SURGICAL HISTORY:  Depression, unspecified depression type  Alzheimer disease  Diabetes  HTN (hypertension)  High cholesterol  No significant past surgical history        PAST SOCIAL HISTORY:    ALLERGIES & MEDICATIONS  --------------------------------------------------------------------------------  Allergies    No Known Allergies        Standing Inpatient Medications  aspirin  chewable 81 milliGRAM(s) Oral daily  atorvastatin 80 milliGRAM(s) Oral at bedtime  cholecalciferol 2000 Unit(s) Oral daily  dextrose 5%. 1000 milliLiter(s) IV Continuous <Continuous>  dextrose 50% Injectable 12.5 Gram(s) IV Push once  dextrose 50% Injectable 25 Gram(s) IV Push once  dextrose 50% Injectable 25 Gram(s) IV Push once  ferrous    sulfate 325 milliGRAM(s) Oral two times a day  heparin  Infusion.  Unit(s)/Hr IV Continuous <Continuous>  hydrALAZINE 75 milliGRAM(s) Oral two times a day  insulin glargine Injectable (LANTUS) 17 Unit(s) SubCutaneous at bedtime  insulin lispro Injectable (HumaLOG) 6 Unit(s) SubCutaneous three times a day before meals  labetalol 100 milliGRAM(s) Oral every 12 hours  NIFEdipine XL 60 milliGRAM(s) Oral daily    PRN Inpatient Medications  dextrose 40% Gel 15 Gram(s) Oral once PRN  glucagon  Injectable 1 milliGRAM(s) IntraMuscular once PRN  heparin  Injectable 4100 Unit(s) IV Push every 6 hours PRN            VITALS/PHYSICAL EXAM  --------------------------------------------------------------------------------  T(C): 36.1 (03-23-19 @ 06:44), Max: 36.7 (03-22-19 @ 15:45)  HR: 77 (03-23-19 @ 06:44) (75 - 87)  BP: 169/80 (03-23-19 @ 06:44) (99/52 - 169/80)  RR: 18 (03-23-19 @ 06:44) (18 - 18)  SpO2: 98% (03-23-19 @ 06:44) (96% - 98%)  Wt(kg): --    Weight (kg): 70 (03-22-19 @ 15:45)      Physical Exam:  	Gen: NAD, confused   	Pulm: CTA B/L  	CV:  S1S2; no rub  	Abd: +distended  	LE: no edema      LABS/STUDIES  --------------------------------------------------------------------------------              9.5    5.05  >-----------<  254      [03-22-19 @ 12:56]              29.3     135  |  100  |  55  ----------------------------<  94      [03-22-19 @ 12:56]  4.5   |  21  |  4.2        Ca     9.8     [03-22-19 @ 12:56]    TPro  8.4  /  Alb  4.0  /  TBili  0.3  /  DBili  x   /  AST  40  /  ALT  21  /  AlkPhos  52  [03-22-19 @ 12:56]    PT/INR: PT 10.50, INR 0.91       [03-22-19 @ 12:56]  PTT: 73.8       [03-23-19 @ 00:18]    Troponin 1.31      [03-23-19 @ 00:18]    Creatinine Trend:  SCr 4.2 [03-22 @ 12:56]    Urinalysis - [03-22-19 @ 14:04]      Color Yellow / Appearance Cloudy / SG 1.015 / pH 6.0      Gluc Negative / Ketone Negative  / Bili Negative / Urobili 0.2       Blood Negative / Protein 100 / Leuk Est Negative / Nitrite Negative      RBC  / WBC  / Hyaline  / Gran  / Sq Epi  / Non Sq Epi Occasional / Bacteria       Iron 47, TIBC 200, %sat 24      [09-08-18 @ 08:49]  Ferritin 195      [09-08-18 @ 08:49]  PTH -- (Ca 9.9)      [09-08-18 @ 08:49]   281  TSH 1.64      [09-08-18 @ 08:49]  Lipid: chol 188, , HDL 80, LDL 86      [09-08-18 @ 08:49]

## 2019-03-23 NOTE — CONSULT NOTE ADULT - ASSESSMENT
68 year old female with a past medical history of HTN, DLD, DM II, CKD V, Alzheimer's dementia and Depression presenting from an Assisted Living Facility with altered mental status and a blood pressure of 80/50. Nurse  # creatinine around baseline  # please document UO/heck bladder scan   # check ph level  # cardiology evaluation , repeat trop  # anemia most probably due to ckd, on feso4 will start JANUARY once h <9  # CT head noted  # no acute indication for RRT  # will follow

## 2019-03-24 LAB
ANION GAP SERPL CALC-SCNC: 13 MMOL/L — SIGNIFICANT CHANGE UP (ref 7–14)
APTT BLD: 22.7 SEC — CRITICAL LOW (ref 27–39.2)
APTT BLD: 27.3 SEC — SIGNIFICANT CHANGE UP (ref 27–39.2)
APTT BLD: 27.6 SEC — SIGNIFICANT CHANGE UP (ref 27–39.2)
BASOPHILS # BLD AUTO: 0.02 K/UL — SIGNIFICANT CHANGE UP (ref 0–0.2)
BASOPHILS NFR BLD AUTO: 0.4 % — SIGNIFICANT CHANGE UP (ref 0–1)
BUN SERPL-MCNC: 51 MG/DL — HIGH (ref 10–20)
CALCIUM SERPL-MCNC: 9.4 MG/DL — SIGNIFICANT CHANGE UP (ref 8.5–10.1)
CHLORIDE SERPL-SCNC: 102 MMOL/L — SIGNIFICANT CHANGE UP (ref 98–110)
CO2 SERPL-SCNC: 22 MMOL/L — SIGNIFICANT CHANGE UP (ref 17–32)
CREAT SERPL-MCNC: 3.9 MG/DL — HIGH (ref 0.7–1.5)
EOSINOPHIL # BLD AUTO: 0.64 K/UL — SIGNIFICANT CHANGE UP (ref 0–0.7)
EOSINOPHIL NFR BLD AUTO: 11.3 % — HIGH (ref 0–8)
FOLATE SERPL-MCNC: 10.7 NG/ML — SIGNIFICANT CHANGE UP
GLUCOSE BLDC GLUCOMTR-MCNC: 102 MG/DL — HIGH (ref 70–99)
GLUCOSE BLDC GLUCOMTR-MCNC: 119 MG/DL — HIGH (ref 70–99)
GLUCOSE BLDC GLUCOMTR-MCNC: 123 MG/DL — HIGH (ref 70–99)
GLUCOSE BLDC GLUCOMTR-MCNC: 91 MG/DL — SIGNIFICANT CHANGE UP (ref 70–99)
GLUCOSE SERPL-MCNC: 83 MG/DL — SIGNIFICANT CHANGE UP (ref 70–99)
HCT VFR BLD CALC: 26.4 % — LOW (ref 37–47)
HCT VFR BLD CALC: 27.5 % — LOW (ref 37–47)
HGB BLD-MCNC: 8.6 G/DL — LOW (ref 12–16)
HGB BLD-MCNC: 8.7 G/DL — LOW (ref 12–16)
IMM GRANULOCYTES NFR BLD AUTO: 0.4 % — HIGH (ref 0.1–0.3)
IRON SATN MFR SERPL: 51 % — HIGH (ref 15–50)
IRON SATN MFR SERPL: 76 UG/DL — SIGNIFICANT CHANGE UP (ref 35–150)
LYMPHOCYTES # BLD AUTO: 1.7 K/UL — SIGNIFICANT CHANGE UP (ref 1.2–3.4)
LYMPHOCYTES # BLD AUTO: 30 % — SIGNIFICANT CHANGE UP (ref 20.5–51.1)
MAGNESIUM SERPL-MCNC: 2.3 MG/DL — SIGNIFICANT CHANGE UP (ref 1.8–2.4)
MCHC RBC-ENTMCNC: 31.1 PG — HIGH (ref 27–31)
MCHC RBC-ENTMCNC: 31.6 G/DL — LOW (ref 32–37)
MCHC RBC-ENTMCNC: 31.7 PG — HIGH (ref 27–31)
MCHC RBC-ENTMCNC: 32.6 G/DL — SIGNIFICANT CHANGE UP (ref 32–37)
MCV RBC AUTO: 97.4 FL — SIGNIFICANT CHANGE UP (ref 81–99)
MCV RBC AUTO: 98.2 FL — SIGNIFICANT CHANGE UP (ref 81–99)
MONOCYTES # BLD AUTO: 0.57 K/UL — SIGNIFICANT CHANGE UP (ref 0.1–0.6)
MONOCYTES NFR BLD AUTO: 10.1 % — HIGH (ref 1.7–9.3)
NEUTROPHILS # BLD AUTO: 2.72 K/UL — SIGNIFICANT CHANGE UP (ref 1.4–6.5)
NEUTROPHILS NFR BLD AUTO: 47.8 % — SIGNIFICANT CHANGE UP (ref 42.2–75.2)
NRBC # BLD: 0 /100 WBCS — SIGNIFICANT CHANGE UP (ref 0–0)
NRBC # BLD: 0 /100 WBCS — SIGNIFICANT CHANGE UP (ref 0–0)
PHOSPHATE SERPL-MCNC: 5 MG/DL — HIGH (ref 2.1–4.9)
PLATELET # BLD AUTO: 238 K/UL — SIGNIFICANT CHANGE UP (ref 130–400)
PLATELET # BLD AUTO: 247 K/UL — SIGNIFICANT CHANGE UP (ref 130–400)
POTASSIUM SERPL-MCNC: 4.4 MMOL/L — SIGNIFICANT CHANGE UP (ref 3.5–5)
POTASSIUM SERPL-SCNC: 4.4 MMOL/L — SIGNIFICANT CHANGE UP (ref 3.5–5)
RBC # BLD: 2.71 M/UL — LOW (ref 4.2–5.4)
RBC # BLD: 2.8 M/UL — LOW (ref 4.2–5.4)
RBC # FLD: 14.4 % — SIGNIFICANT CHANGE UP (ref 11.5–14.5)
RBC # FLD: 14.4 % — SIGNIFICANT CHANGE UP (ref 11.5–14.5)
SODIUM SERPL-SCNC: 137 MMOL/L — SIGNIFICANT CHANGE UP (ref 135–146)
TIBC SERPL-MCNC: 148 UG/DL — LOW (ref 220–430)
TSH SERPL-MCNC: 2.68 UIU/ML — SIGNIFICANT CHANGE UP (ref 0.27–4.2)
UIBC SERPL-MCNC: 72 UG/DL — LOW (ref 110–370)
VIT B12 SERPL-MCNC: 442 PG/ML — SIGNIFICANT CHANGE UP (ref 232–1245)
WBC # BLD: 5.38 K/UL — SIGNIFICANT CHANGE UP (ref 4.8–10.8)
WBC # BLD: 5.67 K/UL — SIGNIFICANT CHANGE UP (ref 4.8–10.8)
WBC # FLD AUTO: 5.38 K/UL — SIGNIFICANT CHANGE UP (ref 4.8–10.8)
WBC # FLD AUTO: 5.67 K/UL — SIGNIFICANT CHANGE UP (ref 4.8–10.8)

## 2019-03-24 RX ORDER — NIFEDIPINE 30 MG
90 TABLET, EXTENDED RELEASE 24 HR ORAL DAILY
Qty: 0 | Refills: 0 | Status: DISCONTINUED | OUTPATIENT
Start: 2019-03-24 | End: 2019-03-25

## 2019-03-24 RX ORDER — CLOPIDOGREL BISULFATE 75 MG/1
75 TABLET, FILM COATED ORAL DAILY
Qty: 0 | Refills: 0 | Status: DISCONTINUED | OUTPATIENT
Start: 2019-03-24 | End: 2019-03-26

## 2019-03-24 RX ORDER — METOPROLOL TARTRATE 50 MG
12.5 TABLET ORAL EVERY 6 HOURS
Qty: 0 | Refills: 0 | Status: DISCONTINUED | OUTPATIENT
Start: 2019-03-24 | End: 2019-03-26

## 2019-03-24 RX ORDER — INSULIN LISPRO 100/ML
4 VIAL (ML) SUBCUTANEOUS
Qty: 0 | Refills: 0 | Status: DISCONTINUED | OUTPATIENT
Start: 2019-03-24 | End: 2019-03-26

## 2019-03-24 RX ORDER — INSULIN GLARGINE 100 [IU]/ML
14 INJECTION, SOLUTION SUBCUTANEOUS AT BEDTIME
Qty: 0 | Refills: 0 | Status: DISCONTINUED | OUTPATIENT
Start: 2019-03-24 | End: 2019-03-26

## 2019-03-24 RX ORDER — HEPARIN SODIUM 5000 [USP'U]/ML
3800 INJECTION INTRAVENOUS; SUBCUTANEOUS ONCE
Qty: 0 | Refills: 0 | Status: COMPLETED | OUTPATIENT
Start: 2019-03-24 | End: 2019-03-24

## 2019-03-24 RX ORDER — HEPARIN SODIUM 5000 [USP'U]/ML
1000 INJECTION INTRAVENOUS; SUBCUTANEOUS
Qty: 25000 | Refills: 0 | Status: DISCONTINUED | OUTPATIENT
Start: 2019-03-24 | End: 2019-03-24

## 2019-03-24 RX ORDER — HEPARIN SODIUM 5000 [USP'U]/ML
1200 INJECTION INTRAVENOUS; SUBCUTANEOUS
Qty: 25000 | Refills: 0 | Status: DISCONTINUED | OUTPATIENT
Start: 2019-03-24 | End: 2019-03-25

## 2019-03-24 RX ADMIN — HEPARIN SODIUM 3800 UNIT(S): 5000 INJECTION INTRAVENOUS; SUBCUTANEOUS at 14:18

## 2019-03-24 RX ADMIN — Medication 75 MILLIGRAM(S): at 06:32

## 2019-03-24 RX ADMIN — Medication 12.5 MILLIGRAM(S): at 17:41

## 2019-03-24 RX ADMIN — Medication 81 MILLIGRAM(S): at 11:47

## 2019-03-24 RX ADMIN — Medication 12.5 MILLIGRAM(S): at 22:22

## 2019-03-24 RX ADMIN — Medication 325 MILLIGRAM(S): at 06:32

## 2019-03-24 RX ADMIN — Medication 325 MILLIGRAM(S): at 17:40

## 2019-03-24 RX ADMIN — Medication 12.5 MILLIGRAM(S): at 11:46

## 2019-03-24 RX ADMIN — Medication 12.5 MILLIGRAM(S): at 06:34

## 2019-03-24 RX ADMIN — Medication 2000 UNIT(S): at 11:46

## 2019-03-24 RX ADMIN — Medication 75 MILLIGRAM(S): at 17:40

## 2019-03-24 RX ADMIN — ATORVASTATIN CALCIUM 80 MILLIGRAM(S): 80 TABLET, FILM COATED ORAL at 22:22

## 2019-03-24 RX ADMIN — Medication 4 UNIT(S): at 11:47

## 2019-03-24 RX ADMIN — CLOPIDOGREL BISULFATE 75 MILLIGRAM(S): 75 TABLET, FILM COATED ORAL at 11:47

## 2019-03-24 RX ADMIN — Medication 90 MILLIGRAM(S): at 06:33

## 2019-03-24 NOTE — PROGRESS NOTE ADULT - ASSESSMENT
68-year-old female with a PMH of HTN, DLD, type II DM, CKD stage V, Alzheimer dementia, and depression who presented from an assisted living facility with altered mental status and a BP of  "80/50."  In the ED, the patient was noted to have a troponin level of 1.31.  She is a very poor historianShe was noted to have atrial fibrillation on her telemetry monitor.    * NSTEMI  - EKG: T-wave inversions in leads III and aVF  - troponin: 1.31- 1.47 - 1.31  - s/p ASA and Plavix loading doses in the ED  - currently on asa 81, heparin drip and will add plavix  - c/w statin and switch labetalol to metoprolol as per cardio recs  -cardio note appreciated  -"Patient is not a good candidate for cardiac catheterization given poor functional / cognitive status, advanced renal disease, and overall poor prognosis"  -will continue medical therapy for now  -LDL was 86 in september    *New-onset atrial fibrillation  - HR is currently controlled; c/w metoprolol  - CHADsVASc score is 4  - continue anticoagulation with heparin infusion; monitor PTT  - TSH level pending and 2D echo ordered    * HTN uncontrolled  - procardia increased to 90  - monitor BP    *Normo/Macrocytic anemia  - f/u iron studies, vitamin b12, folate    *DM II  -FS controlled on the low side  -will adjust FS accordingly    *CKD V  -stable baseline crea 4  -avoid nephrotoxic drugs and hypotension  -Nephrology note appreciated  -anemia, might need EPO when hb < 9  -phosphorus ordered and sent as STAT    DVT ppx; heparin drip  Diet; carb consistent, DASH, renal, will add nepro  Ambulate as tolerated, fall precautions  1:1 constant observation

## 2019-03-24 NOTE — PROGRESS NOTE ADULT - SUBJECTIVE AND OBJECTIVE BOX
SUBJECTIVE:    Patient is a 68y old Female who presents with a chief complaint of altered mental status and a blood pressure of 80/50 (23 Mar 2019 06:47)    Currently admitted to medicine with the primary diagnosis of NSTEMI (non-ST elevated myocardial infarction)     Today is hospital day 3d. This morning she is resting comfortably in bed with 1:1 sit, dishelved and confused. Denies any chest pain, or discomfort. No SOB. No bowel movements.    PAST MEDICAL & SURGICAL HISTORY  Depression, unspecified depression type  Alzheimer disease  Diabetes  HTN (hypertension)  High cholesterol  No significant past surgical history    ALLERGIES:  No Known Allergies    MEDICATIONS:  STANDING MEDICATIONS  aspirin  chewable 81 milliGRAM(s) Oral daily  atorvastatin 80 milliGRAM(s) Oral at bedtime  cholecalciferol 2000 Unit(s) Oral daily  dextrose 5%. 1000 milliLiter(s) IV Continuous <Continuous>  dextrose 50% Injectable 12.5 Gram(s) IV Push once  dextrose 50% Injectable 25 Gram(s) IV Push once  dextrose 50% Injectable 25 Gram(s) IV Push once  ferrous    sulfate 325 milliGRAM(s) Oral two times a day  heparin  Infusion 800 Unit(s)/Hr IV Continuous <Continuous>  hydrALAZINE 75 milliGRAM(s) Oral two times a day  insulin glargine Injectable (LANTUS) 17 Unit(s) SubCutaneous at bedtime  insulin lispro Injectable (HumaLOG) 6 Unit(s) SubCutaneous three times a day before meals  metoprolol tartrate 12.5 milliGRAM(s) Oral every 6 hours  NIFEdipine XL 90 milliGRAM(s) Oral daily    PRN MEDICATIONS  dextrose 40% Gel 15 Gram(s) Oral once PRN  glucagon  Injectable 1 milliGRAM(s) IntraMuscular once PRN  heparin  Injectable 4100 Unit(s) IV Push every 6 hours PRN    VITALS:   T(C): 37.2, Max: 37.2 (19 @ 21:06)  T(F): 98.9, Max: 98.9 (19 @ 21:06)  HR: 86 (68 - 86)  BP: 179/88 (151/80 - 179/88)  RR: 18 (18 - 19)  SpO2: 98% (98% - 98%)    LABS:                        9.5    5.05  )-----------( 254      ( 22 Mar 2019 12:56 )             29.3         135  |  100  |  55<H>  ----------------------------<  94  4.5   |  21  |  4.2<HH>    Ca    9.8      22 Mar 2019 12:56    TPro  8.4<H>  /  Alb  4.0  /  TBili  0.3  /  DBili  x   /  AST  40  /  ALT  21  /  AlkPhos  52      PT/INR - ( 22 Mar 2019 12:56 )   PT: 10.50 sec;   INR: 0.91 ratio         PTT - ( 23 Mar 2019 21:55 )  PTT:34.6 sec  Urinalysis Basic - ( 22 Mar 2019 14:04 )    Color: Yellow / Appearance: Cloudy / S.015 / pH: x  Gluc: x / Ketone: Negative  / Bili: Negative / Urobili: 0.2 mg/dL   Blood: x / Protein: 100 mg/dL / Nitrite: Negative   Leuk Esterase: Negative / RBC: x / WBC x   Sq Epi: x / Non Sq Epi: Occasional /HPF / Bacteria: x      Culture - Urine (collected 22 Mar 2019 14:04)  Source: .Urine Clean Catch (Midstream)  Final Report (23 Mar 2019 23:10):    No growth    Culture - Blood (collected 22 Mar 2019 12:56)  Source: .Blood Blood-Peripheral  Preliminary Report (24 Mar 2019 02:02):    No growth to date.    Culture - Blood (collected 22 Mar 2019 12:56)  Source: .Blood Blood-Peripheral  Preliminary Report (24 Mar 2019 02:02):    No growth to date.      CARDIAC MARKERS ( 23 Mar 2019 00:18 )  x     / 1.31 ng/mL / x     / x     / x      CARDIAC MARKERS ( 22 Mar 2019 21:22 )  x     / 1.47 ng/mL / x     / x     / x      CARDIAC MARKERS ( 22 Mar 2019 12:56 )  x     / 1.31 ng/mL / x     / x     / x          RADIOLOGY:  < from: Xray Chest 1 View-PORTABLE IMMEDIATE (19 @ 15:52) >  Impression:      No radiographic evidence of acute cardiopulmonary disease.    < end of copied text >    PHYSICAL EXAM:  GEN: No acute distress, dishelved, 1: 1 sit  LUNGS: Clear to anterior auscultation bilaterally   HEART: S1/S2 present. RRR.   ABD: Soft, non-tender, non-distended. Bowel sounds present  EXT: no edema  NEURO: confused, agitated, moves all extremities

## 2019-03-24 NOTE — PROGRESS NOTE ADULT - ASSESSMENT
68-year-old female with a PMH of HTN, DLD, type II DM, CKD stage V, Alzheimer dementia, and depression who presented from an assisted living facility with altered mental status and a BP of  "80/50."  In the ED, the patient was noted to have a troponin level of 1.31.  She is a very poor historian. She was noted to have atrial fibrillation on her telemetry monitor.    * NSTEMI  - EKG: T-wave inversions in leads III and aVF  - troponin: 1.31- 1.47 - 1.31  - s/p ASA and Plavix loading doses in the ED  - currently on asa 81, heparin drip and plavix  - c/w statin and switch labetalol to metoprolol as per cardio recs  - As per cardio"Patient is not a good candidate for cardiac catheterization given poor functional / cognitive status, advanced renal disease, and overall poor prognosis"  -will continue medical therapy for now    *New-onset atrial fibrillation  - HR is currently controlled; c/w metoprolol  - CHADsVASc score is 4  - continue anticoagulation with heparin infusion; will increase heparin dose given subtherapeutic PTT levels. PTT at 8 pm.  - TSH level pending and 2D echo ordered    * HTN uncontrolled  - procardia increased to 90  - On metoprolol and hydralazine  - will monitor BP    *Normo/Macrocytic anemia  - f/u iron studies, vitamin b12, folate    *DM II  -FS controlled on the low side  -will adjust FS accordingly    *CKD V  -stable baseline crea 4  -avoid nephrotoxic drugs and hypotension      DVT ppx; heparin drip  Diet; carb consistent, DASH, renal, and nephro  Ambulate as tolerated, fall precautions  1:1 constant observation

## 2019-03-24 NOTE — PROGRESS NOTE ADULT - SUBJECTIVE AND OBJECTIVE BOX
Preceding events noted  s/p Non STEMI  on heparin drip  Has CKD , anemia, HTN  on BP meds presently  No Pain, Dementia hx        MEDICATIONS  (STANDING):  aspirin  chewable 81 milliGRAM(s) Oral daily  atorvastatin 80 milliGRAM(s) Oral at bedtime  cholecalciferol 2000 Unit(s) Oral daily  clopidogrel Tablet 75 milliGRAM(s) Oral daily  dextrose 5%. 1000 milliLiter(s) (50 mL/Hr) IV Continuous <Continuous>  dextrose 50% Injectable 12.5 Gram(s) IV Push once  dextrose 50% Injectable 25 Gram(s) IV Push once  dextrose 50% Injectable 25 Gram(s) IV Push once  ferrous    sulfate 325 milliGRAM(s) Oral two times a day  heparin  Infusion. 1000 Unit(s)/Hr (10 mL/Hr) IV Continuous <Continuous>  hydrALAZINE 75 milliGRAM(s) Oral two times a day  insulin glargine Injectable (LANTUS) 14 Unit(s) SubCutaneous at bedtime  insulin lispro Injectable (HumaLOG) 4 Unit(s) SubCutaneous three times a day before meals  metoprolol tartrate 12.5 milliGRAM(s) Oral every 6 hours  NIFEdipine XL 90 milliGRAM(s) Oral daily    MEDICATIONS  (PRN):  dextrose 40% Gel 15 Gram(s) Oral once PRN Blood Glucose LESS THAN 70 milliGRAM(s)/deciliter  glucagon  Injectable 1 milliGRAM(s) IntraMuscular once PRN Glucose LESS THAN 70 milligrams/deciliter      Vital Signs Last 24 Hrs  T(C): 35.8 (24 Mar 2019 05:55), Max: 37.2 (23 Mar 2019 21:06)  T(F): 96.5 (24 Mar 2019 05:55), Max: 98.9 (23 Mar 2019 21:06)  HR: 73 (24 Mar 2019 05:55) (68 - 86)  BP: 192/90 (24 Mar 2019 05:55) (165/76 - 192/90)  BP(mean): --  RR: 18 (24 Mar 2019 05:55) (18 - 19)  SpO2: --    PHYSICAL EXAM:    Constitutional: NAD, well-groomed, well-developed   demented  HEENT: PERRLA, EOMI, Normal Hearing, MMM  Neck: No LAD, No JVD  Back: Normal spine flexure, No CVA tenderness  Respiratory: CTAB/L  Cardiovascular: S1 and S2, RRR, no M/G/R  Gastrointestinal: BS+, soft, NT/ND  Extremities: No peripheral edema  Vascular: 2+ peripheral pulses  Neurological:    disoriented,, no focal deficits    LABS:                        8.6    5.67  )-----------( 247      ( 24 Mar 2019 02:02 )             26.4     03-24    137  |  102  |  51<H>  ----------------------------<  83  4.4   |  22  |  3.9<H>    Ca    9.4      24 Mar 2019 02:02  Phos  5.0     03-24  Mg     2.3     03-24    TPro  8.4<H>  /  Alb  4.0  /  TBili  0.3  /  DBili  x   /  AST  40  /  ALT  21  /  AlkPhos  52  03-22    PT/INR - ( 22 Mar 2019 12:56 )   PT: 10.50 sec;   INR: 0.91 ratio         PTT - ( 24 Mar 2019 02:02 )  PTT:27.3 sec  Urinalysis Basic - ( 22 Mar 2019 14:04 )    Color: Yellow / Appearance: Cloudy / S.015 / pH: x  Gluc: x / Ketone: Negative  / Bili: Negative / Urobili: 0.2 mg/dL   Blood: x / Protein: 100 mg/dL / Nitrite: Negative   Leuk Esterase: Negative / RBC: x / WBC x   Sq Epi: x / Non Sq Epi: Occasional /HPF / Bacteria: x      Drug Screen Urine:  Alcohol Level    N/A        RADIOLOGY & ADDITIONAL STUDIES:  N/A

## 2019-03-24 NOTE — PROGRESS NOTE ADULT - SUBJECTIVE AND OBJECTIVE BOX
SUBJECTIVE:    Patient is a 68y old Female who presents with a chief complaint of altered mental status and a blood pressure of 80/50 (23 Mar 2019 06:47)    Currently admitted to medicine with the primary diagnosis of NSTEMI (non-ST elevated myocardial infarction)     Today is hospital day 2d. This morning she is resting comfortably in bed with 1:1 sit, dishelved and confused. no events on the monitor.    PAST MEDICAL & SURGICAL HISTORY  Depression, unspecified depression type  Alzheimer disease  Diabetes  HTN (hypertension)  High cholesterol  No significant past surgical history    ALLERGIES:  No Known Allergies    MEDICATIONS:  STANDING MEDICATIONS  aspirin  chewable 81 milliGRAM(s) Oral daily  atorvastatin 80 milliGRAM(s) Oral at bedtime  cholecalciferol 2000 Unit(s) Oral daily  dextrose 5%. 1000 milliLiter(s) IV Continuous <Continuous>  dextrose 50% Injectable 12.5 Gram(s) IV Push once  dextrose 50% Injectable 25 Gram(s) IV Push once  dextrose 50% Injectable 25 Gram(s) IV Push once  ferrous    sulfate 325 milliGRAM(s) Oral two times a day  heparin  Infusion 800 Unit(s)/Hr IV Continuous <Continuous>  hydrALAZINE 75 milliGRAM(s) Oral two times a day  insulin glargine Injectable (LANTUS) 17 Unit(s) SubCutaneous at bedtime  insulin lispro Injectable (HumaLOG) 6 Unit(s) SubCutaneous three times a day before meals  metoprolol tartrate 12.5 milliGRAM(s) Oral every 6 hours  NIFEdipine XL 90 milliGRAM(s) Oral daily    PRN MEDICATIONS  dextrose 40% Gel 15 Gram(s) Oral once PRN  glucagon  Injectable 1 milliGRAM(s) IntraMuscular once PRN  heparin  Injectable 4100 Unit(s) IV Push every 6 hours PRN    VITALS:   T(C): 37.2, Max: 37.2 (19 @ 21:06)  T(F): 98.9, Max: 98.9 (19 @ 21:06)  HR: 86 (68 - 86)  BP: 179/88 (151/80 - 179/88)  RR: 18 (18 - 19)  SpO2: 98% (98% - 98%)    LABS:                        9.5    5.05  )-----------( 254      ( 22 Mar 2019 12:56 )             29.3     -    135  |  100  |  55<H>  ----------------------------<  94  4.5   |  21  |  4.2<HH>    Ca    9.8      22 Mar 2019 12:56    TPro  8.4<H>  /  Alb  4.0  /  TBili  0.3  /  DBili  x   /  AST  40  /  ALT  21  /  AlkPhos  52  03-22    PT/INR - ( 22 Mar 2019 12:56 )   PT: 10.50 sec;   INR: 0.91 ratio         PTT - ( 23 Mar 2019 21:55 )  PTT:34.6 sec  Urinalysis Basic - ( 22 Mar 2019 14:04 )    Color: Yellow / Appearance: Cloudy / S.015 / pH: x  Gluc: x / Ketone: Negative  / Bili: Negative / Urobili: 0.2 mg/dL   Blood: x / Protein: 100 mg/dL / Nitrite: Negative   Leuk Esterase: Negative / RBC: x / WBC x   Sq Epi: x / Non Sq Epi: Occasional /HPF / Bacteria: x      Culture - Urine (collected 22 Mar 2019 14:04)  Source: .Urine Clean Catch (Midstream)  Final Report (23 Mar 2019 23:10):    No growth    Culture - Blood (collected 22 Mar 2019 12:56)  Source: .Blood Blood-Peripheral  Preliminary Report (24 Mar 2019 02:02):    No growth to date.    Culture - Blood (collected 22 Mar 2019 12:56)  Source: .Blood Blood-Peripheral  Preliminary Report (24 Mar 2019 02:02):    No growth to date.      CARDIAC MARKERS ( 23 Mar 2019 00:18 )  x     / 1.31 ng/mL / x     / x     / x      CARDIAC MARKERS ( 22 Mar 2019 21:22 )  x     / 1.47 ng/mL / x     / x     / x      CARDIAC MARKERS ( 22 Mar 2019 12:56 )  x     / 1.31 ng/mL / x     / x     / x          RADIOLOGY:  < from: Xray Chest 1 View-PORTABLE IMMEDIATE (19 @ 15:52) >  Impression:      No radiographic evidence of acute cardiopulmonary disease.    < end of copied text >    PHYSICAL EXAM:  GEN: No acute distress, dishelved, 1: 1 sit  LUNGS: Clear to anterior auscultation bilaterally   HEART: S1/S2 present. RRR.   ABD: Soft, non-tender, non-distended. Bowel sounds present  EXT: no edema  NEURO: confused, agitated, moves all extremities

## 2019-03-25 LAB
ANION GAP SERPL CALC-SCNC: 12 MMOL/L — SIGNIFICANT CHANGE UP (ref 7–14)
APTT BLD: 28.9 SEC — SIGNIFICANT CHANGE UP (ref 27–39.2)
BASOPHILS # BLD AUTO: 0.02 K/UL — SIGNIFICANT CHANGE UP (ref 0–0.2)
BASOPHILS NFR BLD AUTO: 0.4 % — SIGNIFICANT CHANGE UP (ref 0–1)
BUN SERPL-MCNC: 54 MG/DL — HIGH (ref 10–20)
CALCIUM SERPL-MCNC: 9.3 MG/DL — SIGNIFICANT CHANGE UP (ref 8.5–10.1)
CHLORIDE SERPL-SCNC: 102 MMOL/L — SIGNIFICANT CHANGE UP (ref 98–110)
CO2 SERPL-SCNC: 22 MMOL/L — SIGNIFICANT CHANGE UP (ref 17–32)
CREAT SERPL-MCNC: 4.1 MG/DL — CRITICAL HIGH (ref 0.7–1.5)
EOSINOPHIL # BLD AUTO: 0.59 K/UL — SIGNIFICANT CHANGE UP (ref 0–0.7)
EOSINOPHIL NFR BLD AUTO: 11.2 % — HIGH (ref 0–8)
FERRITIN SERPL-MCNC: 321 NG/ML — HIGH (ref 15–150)
GLUCOSE BLDC GLUCOMTR-MCNC: 111 MG/DL — HIGH (ref 70–99)
GLUCOSE BLDC GLUCOMTR-MCNC: 91 MG/DL — SIGNIFICANT CHANGE UP (ref 70–99)
GLUCOSE BLDC GLUCOMTR-MCNC: 95 MG/DL — SIGNIFICANT CHANGE UP (ref 70–99)
GLUCOSE BLDC GLUCOMTR-MCNC: 98 MG/DL — SIGNIFICANT CHANGE UP (ref 70–99)
GLUCOSE SERPL-MCNC: 92 MG/DL — SIGNIFICANT CHANGE UP (ref 70–99)
HCT VFR BLD CALC: 27.6 % — LOW (ref 37–47)
HGB BLD-MCNC: 8.7 G/DL — LOW (ref 12–16)
IMM GRANULOCYTES NFR BLD AUTO: 0.4 % — HIGH (ref 0.1–0.3)
LYMPHOCYTES # BLD AUTO: 2 K/UL — SIGNIFICANT CHANGE UP (ref 1.2–3.4)
LYMPHOCYTES # BLD AUTO: 38 % — SIGNIFICANT CHANGE UP (ref 20.5–51.1)
MCHC RBC-ENTMCNC: 30.7 PG — SIGNIFICANT CHANGE UP (ref 27–31)
MCHC RBC-ENTMCNC: 31.5 G/DL — LOW (ref 32–37)
MCV RBC AUTO: 97.5 FL — SIGNIFICANT CHANGE UP (ref 81–99)
MONOCYTES # BLD AUTO: 0.58 K/UL — SIGNIFICANT CHANGE UP (ref 0.1–0.6)
MONOCYTES NFR BLD AUTO: 11 % — HIGH (ref 1.7–9.3)
NEUTROPHILS # BLD AUTO: 2.05 K/UL — SIGNIFICANT CHANGE UP (ref 1.4–6.5)
NEUTROPHILS NFR BLD AUTO: 39 % — LOW (ref 42.2–75.2)
NRBC # BLD: 0 /100 WBCS — SIGNIFICANT CHANGE UP (ref 0–0)
PLATELET # BLD AUTO: 258 K/UL — SIGNIFICANT CHANGE UP (ref 130–400)
POTASSIUM SERPL-MCNC: 4.3 MMOL/L — SIGNIFICANT CHANGE UP (ref 3.5–5)
POTASSIUM SERPL-SCNC: 4.3 MMOL/L — SIGNIFICANT CHANGE UP (ref 3.5–5)
RBC # BLD: 2.83 M/UL — LOW (ref 4.2–5.4)
RBC # FLD: 14.6 % — HIGH (ref 11.5–14.5)
SODIUM SERPL-SCNC: 136 MMOL/L — SIGNIFICANT CHANGE UP (ref 135–146)
WBC # BLD: 5.26 K/UL — SIGNIFICANT CHANGE UP (ref 4.8–10.8)
WBC # FLD AUTO: 5.26 K/UL — SIGNIFICANT CHANGE UP (ref 4.8–10.8)

## 2019-03-25 RX ORDER — NIFEDIPINE 30 MG
30 TABLET, EXTENDED RELEASE 24 HR ORAL DAILY
Qty: 0 | Refills: 0 | Status: DISCONTINUED | OUTPATIENT
Start: 2019-03-26 | End: 2019-03-26

## 2019-03-25 RX ORDER — APIXABAN 2.5 MG/1
1 TABLET, FILM COATED ORAL
Qty: 60 | Refills: 0 | OUTPATIENT
Start: 2019-03-25 | End: 2019-04-23

## 2019-03-25 RX ORDER — APIXABAN 2.5 MG/1
2.5 TABLET, FILM COATED ORAL EVERY 12 HOURS
Qty: 0 | Refills: 0 | Status: DISCONTINUED | OUTPATIENT
Start: 2019-03-25 | End: 2019-03-26

## 2019-03-25 RX ADMIN — CLOPIDOGREL BISULFATE 75 MILLIGRAM(S): 75 TABLET, FILM COATED ORAL at 12:10

## 2019-03-25 RX ADMIN — HEPARIN SODIUM 14 UNIT(S)/HR: 5000 INJECTION INTRAVENOUS; SUBCUTANEOUS at 08:45

## 2019-03-25 RX ADMIN — Medication 4 UNIT(S): at 17:56

## 2019-03-25 RX ADMIN — APIXABAN 2.5 MILLIGRAM(S): 2.5 TABLET, FILM COATED ORAL at 17:55

## 2019-03-25 RX ADMIN — Medication 81 MILLIGRAM(S): at 12:10

## 2019-03-25 RX ADMIN — Medication 12.5 MILLIGRAM(S): at 12:10

## 2019-03-25 RX ADMIN — Medication 12.5 MILLIGRAM(S): at 17:55

## 2019-03-25 RX ADMIN — Medication 75 MILLIGRAM(S): at 17:55

## 2019-03-25 RX ADMIN — Medication 75 MILLIGRAM(S): at 06:14

## 2019-03-25 RX ADMIN — Medication 325 MILLIGRAM(S): at 06:14

## 2019-03-25 RX ADMIN — Medication 90 MILLIGRAM(S): at 06:14

## 2019-03-25 RX ADMIN — ATORVASTATIN CALCIUM 80 MILLIGRAM(S): 80 TABLET, FILM COATED ORAL at 21:54

## 2019-03-25 RX ADMIN — Medication 12.5 MILLIGRAM(S): at 06:14

## 2019-03-25 RX ADMIN — APIXABAN 2.5 MILLIGRAM(S): 2.5 TABLET, FILM COATED ORAL at 12:38

## 2019-03-25 RX ADMIN — Medication 2000 UNIT(S): at 12:10

## 2019-03-25 RX ADMIN — Medication 4 UNIT(S): at 08:45

## 2019-03-25 RX ADMIN — Medication 4 UNIT(S): at 12:10

## 2019-03-25 RX ADMIN — Medication 325 MILLIGRAM(S): at 17:55

## 2019-03-25 NOTE — PROGRESS NOTE ADULT - ASSESSMENT
68 year old female with a past medical history of HTN, DLD, DM II, CKD V, Alzheimer's dementia and Depression presenting from an Assisted Living Facility with altered mental status and a blood pressure of 80/50. Nurse  # creatinine around baseline  # check ph level  # anemia most probably due to ckd, on feso4 will start JANUARY once h <9  # Hypotension decrease procardia to 30 q24h   # CT head noted  # no acute indication for RRT  # from renal standpoint can follow up as OP

## 2019-03-25 NOTE — PROGRESS NOTE ADULT - SUBJECTIVE AND OBJECTIVE BOX
Nephrology progress note    Patient is seen and examined, events over the last 24 h noted .    Allergies:  No Known Allergies    Hospital Medications:   MEDICATIONS  (STANDING):  aspirin  chewable 81 milliGRAM(s) Oral daily  atorvastatin 80 milliGRAM(s) Oral at bedtime  cholecalciferol 2000 Unit(s) Oral daily  clopidogrel Tablet 75 milliGRAM(s) Oral daily  dextrose 5%. 1000 milliLiter(s) (50 mL/Hr) IV Continuous <Continuous>  dextrose 50% Injectable 12.5 Gram(s) IV Push once  dextrose 50% Injectable 25 Gram(s) IV Push once  dextrose 50% Injectable 25 Gram(s) IV Push once  ferrous    sulfate 325 milliGRAM(s) Oral two times a day  heparin  Infusion 1200 Unit(s)/Hr (14 mL/Hr) IV Continuous <Continuous>  hydrALAZINE 75 milliGRAM(s) Oral two times a day  insulin glargine Injectable (LANTUS) 14 Unit(s) SubCutaneous at bedtime  insulin lispro Injectable (HumaLOG) 4 Unit(s) SubCutaneous three times a day before meals  metoprolol tartrate 12.5 milliGRAM(s) Oral every 6 hours  NIFEdipine XL 90 milliGRAM(s) Oral daily        VITALS:  T(F): 98.1 (19 @ 06:00), Max: 98.1 (19 @ 06:00)  HR: 80 (19 @ 10:11)  BP: 97/51 (19 @ 10:11)  RR: 18 (19 @ 06:00)  SpO2: --  Wt(kg): --     @ 07:01  -   @ 07:00  --------------------------------------------------------  IN: 240 mL / OUT: 0 mL / NET: 240 mL          PHYSICAL EXAM:  Constitutional: NAD  HEENT: anicteric sclera, oropharynx clear, MMM  Neck: No JVD  Respiratory: CTAB, no wheezes, rales or rhonchi  Cardiovascular: S1, S2, RRR  Gastrointestinal: BS+, soft, NT/ND  Extremities: No cyanosis or clubbing. No peripheral edema  :  No patterson.   Skin: No rashes    LABS:      136  |  102  |  54<H>  ----------------------------<  92  4.3   |  22  |  4.1<HH>    Ca    9.3      25 Mar 2019 06:59  Phos  5.0     03-24  Mg     2.3     03-24                            8.7    5.26  )-----------( 258      ( 25 Mar 2019 06:59 )             27.6       Urine Studies:  Urinalysis Basic - ( 22 Mar 2019 14:04 )    Color: Yellow / Appearance: Cloudy / S.015 / pH:   Gluc:  / Ketone: Negative  / Bili: Negative / Urobili: 0.2 mg/dL   Blood:  / Protein: 100 mg/dL / Nitrite: Negative   Leuk Esterase: Negative / RBC:  / WBC    Sq Epi:  / Non Sq Epi: Occasional /HPF / Bacteria:         RADIOLOGY & ADDITIONAL STUDIES: Nephrology progress note    Patient is seen and examined, events over the last 24 h noted .  denied any complaints   has a 1:1 sit    Allergies:  No Known Allergies    Hospital Medications:   MEDICATIONS  (STANDING):  aspirin  chewable 81 milliGRAM(s) Oral daily  atorvastatin 80 milliGRAM(s) Oral at bedtime  cholecalciferol 2000 Unit(s) Oral daily  clopidogrel Tablet 75 milliGRAM(s) Oral daily  ferrous    sulfate 325 milliGRAM(s) Oral two times a day  heparin  Infusion 1200 Unit(s)/Hr (14 mL/Hr) IV Continuous <Continuous>  hydrALAZINE 75 milliGRAM(s) Oral two times a day  insulin glargine Injectable (LANTUS) 14 Unit(s) SubCutaneous at bedtime  insulin lispro Injectable (HumaLOG) 4 Unit(s) SubCutaneous three times a day before meals  metoprolol tartrate 12.5 milliGRAM(s) Oral every 6 hours  NIFEdipine XL 90 milliGRAM(s) Oral daily        VITALS:  T(F): 98.1 (19 @ 06:00), Max: 98.1 (19 @ 06:00)  HR: 80 (19 @ 10:11)  BP: 97/51 (19 @ 10:11)  RR: 18 (19 @ 06:00)       @ 07:01  -   @ 07:00  --------------------------------------------------------  IN: 240 mL / OUT: 0 mL / NET: 240 mL          PHYSICAL EXAM:  Constitutional: NAD  HEENT: anicteric sclera, oropharynx clear, MMM  Neck: No JVD  Respiratory: CTAB, no wheezes, rales or rhonchi  Cardiovascular: S1, S2, RRR  Gastrointestinal: BS+, soft, NT/ND  Extremities: No cyanosis or clubbing. No peripheral edema  :  No patterson.   Skin: No rashes    LABS:      136  |  102  |  54<H>  ----------------------------<  92  4.3   |  22  |  4.1<HH>    Ca    9.3      25 Mar 2019 06:59  Phos  5.0     03-24  Mg     2.3     03-24                            8.7    5.26  )-----------( 258      ( 25 Mar 2019 06:59 )             27.6       Urine Studies:  Urinalysis Basic - ( 22 Mar 2019 14:04 )    Color: Yellow / Appearance: Cloudy / S.015 / pH:   Gluc:  / Ketone: Negative  / Bili: Negative / Urobili: 0.2 mg/dL   Blood:  / Protein: 100 mg/dL / Nitrite: Negative   Leuk Esterase: Negative / RBC:  / WBC    Sq Epi:  / Non Sq Epi: Occasional /HPF / Bacteria:         RADIOLOGY & ADDITIONAL STUDIES:

## 2019-03-25 NOTE — PROGRESS NOTE ADULT - ASSESSMENT
68-year-old female with a PMH of HTN, DLD, type II DM, CKD stage V, Alzheimer dementia, and depression who presented from an assisted living facility with altered mental status and a BP of  "80/50."  In the ED, the patient was noted to have a troponin level of 1.31.  She is a very poor historian. She was noted to have atrial fibrillation on her telemetry monitor.    * NSTEMI  - EKG: T-wave inversions in leads III and aVF  - troponin: 1.31- 1.47 - 1.31  - s/p ASA and Plavix loading doses in the ED  - currently on asa 81, heparin drip and plavix  - c/w statin and switch labetalol to metoprolol as per cardio recs  - As per cardio"Patient is not a good candidate for cardiac catheterization given poor functional / cognitive status, advanced renal disease, and overall poor prognosis"  -will continue medical therapy for now    *New-onset atrial fibrillation  - HR is currently controlled; c/w metoprolol  - CHADsVASc score is 4  - continue anticoagulation with heparin infusion; will increase heparin dose given subtherapeutic PTT levels. PTT at 8 pm.  - TSH level pending and 2D echo ordered    * HTN uncontrolled  - procardia increased to 90  - On metoprolol and hydralazine  - will monitor BP    *Normo/Macrocytic anemia  - f/u iron studies, vitamin b12, folate    *DM II  -FS controlled on the low side  -will adjust FS accordingly    *CKD V  -stable baseline crea 4  -avoid nephrotoxic drugs and hypotension    *Suspected Vitamin-D deficiency  -On Vitamin-D oral    DVT ppx; heparin drip  Diet; carb consistent, DASH, renal, and nephro  Ambulate as tolerated, fall precautions  1:1 constant observation 68-year-old female with a PMH of HTN, DLD, type II DM, CKD stage V, Alzheimer dementia, and depression who presented from an assisted living facility with altered mental status and a BP of  "80/50."  In the ED, the patient was noted to have a troponin level of 1.31.  She is a very poor historian. She was noted to have atrial fibrillation on her telemetry monitor.    * NSTEMI  - currently on asa and plavix  - c/w statin and switch labetalol to metoprolol as per cardio recs  - Not a good candidate for cath per cardio  - f/u Echo    * New-onset atrial fibrillation  - HR is currently controlled; c/w metoprolol  - CHADsVASc score is 4  - Started on Eliquis 2.5 mg BID, renally adjusted dose  - TSH level nl    * HTN, normal when measured manually   - some low readings, will decrease procardia to 30  - On metoprolol and hydralazine    *Normo/Macrocytic anemia  - f/u iron studies, vitamin b12, folate    *DM II  -FS controlled on the low side  -will adjust FS accordingly    *CKD V  -stable baseline crea 4  -avoid nephrotoxic drugs and hypotension    *Suspected Vitamin-D deficiency  -On Vitamin-D oral    DVT ppx; heparin drip  Diet; carb consistent, DASH, renal, and nephro  Ambulate as tolerated, fall precautions  1:1 constant observation 68-year-old female with a PMH of HTN, DLD, type II DM, CKD stage V, Alzheimer dementia, and depression who presented from an assisted living facility with altered mental status and a BP of  "80/50."  In the ED, the patient was noted to have a troponin level of 1.31.  She is a very poor historian. She was noted to have atrial fibrillation on her telemetry monitor.    * NSTEMI  - currently on asa and plavix  - c/w statin and switch labetalol to metoprolol as per cardio recs  - Not a good candidate for cath per cardio  - f/u Echo    * New-onset atrial fibrillation  - HR is currently controlled; c/w metoprolol  - CHADsVASc score is 4  - Started on Eliquis 2.5 mg BID, renally adjusted dose  - TSH level nl    * HTN, normal when measured manually   - some low readings, will decrease procardia to 30  - On metoprolol and hydralazine    *Normo/Macrocytic anemia  - f/u iron studies, vitamin b12, folate    *DM II  -FS controlled on the low side, monitor    *CKD V  -stable baseline crea 4  -avoid nephrotoxic drugs and hypotension    *Suspected Vitamin-D deficiency  -On Vitamin-D oral    DVT ppx; heparin drip  Diet; carb consistent, DASH, renal, and nephro  Ambulate as tolerated, fall precautions  1:1 constant observation  Dispo: Medically cleared, pending results of ECHO, anticipate d/c tomorrow

## 2019-03-25 NOTE — PROGRESS NOTE ADULT - SUBJECTIVE AND OBJECTIVE BOX
Pt seen, preceding events noted  comfortable, sitting in chair  No CP  back to her baseline  spoke to nephrology, dr brock huff  cleared from renal  need cardiac f/u      MEDICATIONS  (STANDING):  aspirin  chewable 81 milliGRAM(s) Oral daily  atorvastatin 80 milliGRAM(s) Oral at bedtime  cholecalciferol 2000 Unit(s) Oral daily  clopidogrel Tablet 75 milliGRAM(s) Oral daily  dextrose 5%. 1000 milliLiter(s) (50 mL/Hr) IV Continuous <Continuous>  dextrose 50% Injectable 12.5 Gram(s) IV Push once  dextrose 50% Injectable 25 Gram(s) IV Push once  dextrose 50% Injectable 25 Gram(s) IV Push once  ferrous    sulfate 325 milliGRAM(s) Oral two times a day  heparin  Infusion 1200 Unit(s)/Hr (14 mL/Hr) IV Continuous <Continuous>  hydrALAZINE 75 milliGRAM(s) Oral two times a day  insulin glargine Injectable (LANTUS) 14 Unit(s) SubCutaneous at bedtime  insulin lispro Injectable (HumaLOG) 4 Unit(s) SubCutaneous three times a day before meals  metoprolol tartrate 12.5 milliGRAM(s) Oral every 6 hours  NIFEdipine XL 90 milliGRAM(s) Oral daily    MEDICATIONS  (PRN):  dextrose 40% Gel 15 Gram(s) Oral once PRN Blood Glucose LESS THAN 70 milliGRAM(s)/deciliter  glucagon  Injectable 1 milliGRAM(s) IntraMuscular once PRN Glucose LESS THAN 70 milligrams/deciliter      Vital Signs Last 24 Hrs  T(C): 36.7 (25 Mar 2019 06:00), Max: 36.7 (25 Mar 2019 06:00)  T(F): 98.1 (25 Mar 2019 06:00), Max: 98.1 (25 Mar 2019 06:00)  HR: 80 (25 Mar 2019 10:11) (67 - 85)  BP: 97/51 (25 Mar 2019 10:11) (97/51 - 186/77)  BP(mean): --  RR: 18 (25 Mar 2019 06:00) (18 - 18)  SpO2: --    PHYSICAL EXAM:    Constitutional: NAD, well-groomed, well-developed  HEENT: PERRLA, EOMI, Normal Hearing, MMM  Neck: No LAD, No JVD  Back: Normal spine flexure, No CVA tenderness  Respiratory: CTAB/L  Cardiovascular: S1 and S2, RRR, no M/G/R  Gastrointestinal: BS+, soft, NT/ND  Extremities: No peripheral edema  Vascular: 2+ peripheral pulses  Neurological:  demented  , no focal deficits    LABS:                        8.7    5.26  )-----------( 258      ( 25 Mar 2019 06:59 )             27.6     03-25    136  |  102  |  54<H>  ----------------------------<  92  4.3   |  22  |  4.1<HH>    Ca    9.3      25 Mar 2019 06:59  Phos  5.0     03-24  Mg     2.3     03-24      PTT - ( 25 Mar 2019 06:59 )  PTT:28.9 sec    Drug Screen Urine:  Alcohol Level    n/a    RADIOLOGY & ADDITIONAL STUDIES:    echo report pending

## 2019-03-25 NOTE — PROGRESS NOTE ADULT - SUBJECTIVE AND OBJECTIVE BOX
Patient seen and examined, denies any complaints although she is a poor historian. BP was elevated this AM, repeat was low, done manually and was 120/70. So likely that electronic machine readings are not accurate.      PHYSICAL EXAM:T(C): 36.8 (03-25-19 @ 12:43), Max: 36.8 (03-25-19 @ 12:43)  HR: 81 (03-25-19 @ 12:43) (67 - 85)  BP: 114/67 (03-25-19 @ 12:43) (97/51 - 186/77)  RR: 18 (03-25-19 @ 12:43) (18 - 18)  SpO2: --  GENERAL: NAD, well-developed  HEAD:  Atraumatic, Normocephalic  EYES: EOMI, PERRLA, conjunctiva and sclera clear  NECK: Supple, No JVD  CHEST/LUNG: Clear to auscultation bilaterally; No wheeze  HEART: Regular rate and rhythm; No murmurs, rubs, or gallops  ABDOMEN: Soft, Nontender, Nondistended; Bowel sounds present  EXTREMITIES:  2+ Peripheral Pulses, No clubbing, cyanosis, or edema  PSYCH: AAOx2  NEUROLOGY: non-focal  SKIN: No rashes or lesions                              8.7    5.26  )-----------( 258      ( 25 Mar 2019 06:59 )             27.6       03-25    136  |  102  |  54<H>  ----------------------------<  92  4.3   |  22  |  4.1<HH>    Ca    9.3      25 Mar 2019 06:59  Phos  5.0     03-24  Mg     2.3     03-24

## 2019-03-26 VITALS
TEMPERATURE: 97 F | SYSTOLIC BLOOD PRESSURE: 149 MMHG | RESPIRATION RATE: 18 BRPM | HEART RATE: 77 BPM | DIASTOLIC BLOOD PRESSURE: 82 MMHG

## 2019-03-26 DIAGNOSIS — I10 ESSENTIAL (PRIMARY) HYPERTENSION: ICD-10-CM

## 2019-03-26 LAB
GLUCOSE BLDC GLUCOMTR-MCNC: 83 MG/DL — SIGNIFICANT CHANGE UP (ref 70–99)
GLUCOSE BLDC GLUCOMTR-MCNC: 96 MG/DL — SIGNIFICANT CHANGE UP (ref 70–99)

## 2019-03-26 RX ORDER — NIFEDIPINE 30 MG
1 TABLET, EXTENDED RELEASE 24 HR ORAL
Qty: 30 | Refills: 0 | OUTPATIENT
Start: 2019-03-26 | End: 2019-04-24

## 2019-03-26 RX ORDER — FUROSEMIDE 40 MG
1 TABLET ORAL
Qty: 0 | Refills: 0 | COMMUNITY

## 2019-03-26 RX ORDER — METOPROLOL TARTRATE 50 MG
12.5 TABLET ORAL
Qty: 15 | Refills: 0 | OUTPATIENT
Start: 2019-03-26 | End: 2019-04-24

## 2019-03-26 RX ORDER — CLOPIDOGREL BISULFATE 75 MG/1
1 TABLET, FILM COATED ORAL
Qty: 30 | Refills: 0 | OUTPATIENT
Start: 2019-03-26 | End: 2019-04-24

## 2019-03-26 RX ADMIN — Medication 12.5 MILLIGRAM(S): at 00:04

## 2019-03-26 RX ADMIN — Medication 12.5 MILLIGRAM(S): at 18:16

## 2019-03-26 RX ADMIN — Medication 75 MILLIGRAM(S): at 18:16

## 2019-03-26 RX ADMIN — APIXABAN 2.5 MILLIGRAM(S): 2.5 TABLET, FILM COATED ORAL at 18:16

## 2019-03-26 RX ADMIN — Medication 2000 UNIT(S): at 11:58

## 2019-03-26 RX ADMIN — CLOPIDOGREL BISULFATE 75 MILLIGRAM(S): 75 TABLET, FILM COATED ORAL at 11:59

## 2019-03-26 RX ADMIN — Medication 81 MILLIGRAM(S): at 11:58

## 2019-03-26 RX ADMIN — Medication 12.5 MILLIGRAM(S): at 11:58

## 2019-03-26 RX ADMIN — Medication 325 MILLIGRAM(S): at 18:16

## 2019-03-26 NOTE — DISCHARGE NOTE PROVIDER - NSDCCPCAREPLAN_GEN_ALL_CORE_FT
PRINCIPAL DISCHARGE DIAGNOSIS  Diagnosis: NSTEMI (non-ST elevated myocardial infarction)  Assessment and Plan of Treatment: Continue medications as perscribed, follow-up with primary care doctor      SECONDARY DISCHARGE DIAGNOSES  Diagnosis: Paroxysmal atrial fibrillation  Assessment and Plan of Treatment: Diagnosed on ECG in Emergency room, Started on Eliquis anticoagulation. Adjusted Heart rate medications. Follow-up with primary doctor.

## 2019-03-26 NOTE — DISCHARGE NOTE PROVIDER - CARE PROVIDER_API CALL
Layla Rudolph)  Internal Medicine  [address] [place] [address]  Phone: [phone]  Fax: [phone]  Follow Up Time:

## 2019-03-26 NOTE — DISCHARGE NOTE NURSING/CASE MANAGEMENT/SOCIAL WORK - NSDCDPATPORTLINK_GEN_ALL_CORE
You can access the [facility] Patient Portal, offered by [facility], by registering with the following website: [email or web address]

## 2019-03-26 NOTE — CHART NOTE - NSCHARTNOTEFT_GEN_A_CORE
<<<RESIDENT DISCHARGE NOTE>>>     MITCHELL NGUYEN  MRN-[id number]    VITAL SIGNS:  T(F): 97.1 (03-26-19 @ 12:45), Max: 97.1 (03-26-19 @ 12:45)  HR: 77 (03-26-19 @ 12:45)  BP: 149/82 (03-26-19 @ 12:45)  SpO2: --      PHYSICAL EXAMINATION:  General: Awake, alert, oriented  Head & Neck: No JVD, atraumatic  Pulmonary: Clear to auscultation  Cardiovascular: Regular S1/S2, no murmurs  Gastrointestinal/Abdomen & Pelvis: Soft, non-tender, non-distended  Neurologic/Motor: Non-focal, A0*1    TEST RESULTS:                        8.7    5.26  )-----------( 258      ( 25 Mar 2019 06:59 )             27.6       03-25    136  |  102  |  54<H>  ----------------------------<  92  4.3   |  22  |  4.1<HH>    Ca    9.3      25 Mar 2019 06:59        FINAL DISCHARGE INTERVIEW:  Resident(s) Present: (Name: Kavon, RN Present: (Name: Lauren)    DISCHARGE MEDICATION RECONCILIATION  reviewed with Attending (Name: Nicci)    DISPOSITION:   [  ] Home,    [  ] Home with Visiting Nursing Services,   [    ]  SNF/ NH,    [   ] Acute Rehab (4A),   [ x  ] Other (Specify: [facility] long term [facility])

## 2019-03-26 NOTE — PROGRESS NOTE ADULT - ASSESSMENT
68 year old female with a past medical history of HTN, DLD, DM II, CKD V, Alzheimer's dementia and Depression presenting from an Assisted Living Facility with altered mental status and a blood pressure of 80/50. Nurse  # creatinine around baseline, stable   # check ph level  # anemia most probably due to ckd, on feso4 , increase to q 8, start procrit 5000 units s/c weekly   #BP at goal    # no acute indication for RRT  # will follow

## 2019-03-26 NOTE — DISCHARGE NOTE PROVIDER - HOSPITAL COURSE
68-year-old female with a PMH of HTN, DLD, type II DM, CKD stage V, Alzheimer dementia, and depression who presented from an assisted living facility with altered mental status and a BP of  "80/50."  In the ED, the patient was noted to have a troponin level of 1.31.  She is a very poor historian. She was noted to have atrial fibrillation on her telemetry monitor.        * NSTEMI    - currently on asa and plavix    - c/w statin and switch labetalol to metoprolol as per cardio recs    - Not a good candidate for cath per cardio    - f/u Echo        * New-onset atrial fibrillation    - HR is currently controlled; c/w metoprolol    - CHADsVASc score is 4    - Started on Eliquis 2.5 mg BID, renally adjusted dose    - TSH level nl        * HTN, normal when measured manually     - some low readings, will decrease procardia to 30    - On metoprolol and hydralazine        *Normo/Macrocytic anemia    - f/u iron studies, vitamin b12, folate        *DM II    -FS controlled on the low side, monitor        *CKD V    -stable baseline crea 4    -avoid nephrotoxic drugs and hypotension        *Suspected Vitamin-D deficiency    -On Vitamin-D oral        DVT ppx; heparin drip    Diet; carb consistent, DASH, renal, and nephro    Ambulate as tolerated, fall precautions    1:1 constant observation    Dispo: Medically cleared,

## 2019-03-26 NOTE — PROGRESS NOTE ADULT - ASSESSMENT
S/P NSTEMI  CKD  Dementia  Anemia        D/C back to [facility] today  [facility]pt[facility]  Keep on eliquis for a month and then switch to plavix  Will do f/u EKG in 2 wks. as outpt.  Will f/u with pt at [place] next Mon

## 2019-03-26 NOTE — PROGRESS NOTE ADULT - SUBJECTIVE AND OBJECTIVE BOX
Pt seen, chart reviewewd  preceding 24 hr events noted  pt decline echo  Back to her dementia baseline  CKD, s/p MI, anemia  asymptomatic .      SOCIAL HISTORY:N/A    REVIEW OF SYSTEMS:    Constitutional: No fever, weight loss or fatigue  ENT:  No difficulty hearing, tinnitus, vertigo; No sinus or throat pain  Neck: No pain or stiffness  Respiratory: No cough, wheezing, chills or hemoptysis  Cardiovascular: No chest pain, palpitations, shortness of breath, dizziness or leg swelling  Gastrointestinal: No abdominal or epigastric pain. No nausea, vomiting or hematemesis; No diarrhea or constipation. No melena or hematochezia.  Neurological: No headaches, memory loss, loss of strength, numbness or tremors (++) dementia  Musculoskeletal: No joint pain or swelling; No muscle, back or extremity pain  Psychiatric: No depression, anxiety, mood swings or difficulty sleeping    MEDICATIONS  (STANDING):  apixaban 2.5 milliGRAM(s) Oral every 12 hours  aspirin  chewable 81 milliGRAM(s) Oral daily  atorvastatin 80 milliGRAM(s) Oral at bedtime  cholecalciferol 2000 Unit(s) Oral daily  clopidogrel Tablet 75 milliGRAM(s) Oral daily  dextrose 5%. 1000 milliLiter(s) (50 mL/Hr) IV Continuous <Continuous>  dextrose 50% Injectable 12.5 Gram(s) IV Push once  dextrose 50% Injectable 25 Gram(s) IV Push once  dextrose 50% Injectable 25 Gram(s) IV Push once  ferrous    sulfate 325 milliGRAM(s) Oral two times a day  hydrALAZINE 75 milliGRAM(s) Oral two times a day  insulin glargine Injectable (LANTUS) 14 Unit(s) SubCutaneous at bedtime  insulin lispro Injectable (HumaLOG) 4 Unit(s) SubCutaneous three times a day before meals  metoprolol tartrate 12.5 milliGRAM(s) Oral every 6 hours  NIFEdipine XL 30 milliGRAM(s) Oral daily    MEDICATIONS  (PRN):  dextrose 40% Gel 15 Gram(s) Oral once PRN Blood Glucose LESS THAN 70 milliGRAM(s)/deciliter  glucagon  Injectable 1 milliGRAM(s) IntraMuscular once PRN Glucose LESS THAN 70 milligrams/deciliter      Vital Signs Last 24 Hrs  T(C): 35.6 (25 Mar 2019 20:39), Max: 36.8 (25 Mar 2019 12:43)  T(F): 96.1 (25 Mar 2019 20:39), Max: 98.2 (25 Mar 2019 12:43)  HR: 75 (26 Mar 2019 06:19) (20 - 81)  BP: 154/72 (26 Mar 2019 06:19) (114/67 - 154/72)  BP(mean): --  RR: 18 (25 Mar 2019 20:39) (18 - 18)  SpO2: --    PHYSICAL EXAM:    Constitutional: NAD, well-groomed, well-developed  HEENT: PERRLA, EOMI, Normal Hearing, MMM  Neck: No LAD, No JVD  Back: Normal spine flexure, No CVA tenderness  Respiratory: CTAB/L  Cardiovascular: S1 and S2, RRR, no M/G/R  Gastrointestinal: BS+, soft, NT/ND  Extremities: No peripheral edema  Vascular: 2+ peripheral pulses  Neurological:   demented   no focal deficits    LABS:                        8.7    5.26  )-----------( 258      ( 25 Mar 2019 06:59 )             27.6     03-25    136  |  102  |  54<H>  ----------------------------<  92  4.3   |  22  |  4.1<HH>    Ca    9.3      25 Mar 2019 06:59      PTT - ( 25 Mar 2019 06:59 )  PTT:28.9 sec    Drug Screen Urine:  Alcohol Level  N/A      RADIOLOGY & ADDITIONAL STUDIES:  n/a

## 2019-03-26 NOTE — PROGRESS NOTE ADULT - SUBJECTIVE AND OBJECTIVE BOX
patient seen   no distress   lying comfortable       Standing Inpatient Medications  apixaban 2.5 milliGRAM(s) Oral every 12 hours  aspirin  chewable 81 milliGRAM(s) Oral daily  atorvastatin 80 milliGRAM(s) Oral at bedtime  cholecalciferol 2000 Unit(s) Oral daily  clopidogrel Tablet 75 milliGRAM(s) Oral daily  dextrose 5%. 1000 milliLiter(s) IV Continuous <Continuous>  dextrose 50% Injectable 12.5 Gram(s) IV Push once  dextrose 50% Injectable 25 Gram(s) IV Push once  dextrose 50% Injectable 25 Gram(s) IV Push once  ferrous    sulfate 325 milliGRAM(s) Oral two times a day  hydrALAZINE 75 milliGRAM(s) Oral two times a day  insulin glargine Injectable (LANTUS) 14 Unit(s) SubCutaneous at bedtime  insulin lispro Injectable (HumaLOG) 4 Unit(s) SubCutaneous three times a day before meals  metoprolol tartrate 12.5 milliGRAM(s) Oral every 6 hours  NIFEdipine XL 30 milliGRAM(s) Oral daily    PRN Inpatient Medications  dextrose 40% Gel 15 Gram(s) Oral once PRN  glucagon  Injectable 1 milliGRAM(s) IntraMuscular once PRN      VITALS/PHYSICAL EXAM  --------------------------------------------------------------------------------  T(C): 35.6 (03-25-19 @ 20:39), Max: 36.8 (03-25-19 @ 12:43)  HR: 75 (03-26-19 @ 06:19) (20 - 81)  BP: 154/72 (03-26-19 @ 06:19) (97/51 - 154/72)  RR: 18 (03-25-19 @ 20:39) (18 - 18)  SpO2: --  Wt(kg): --        03-25-19 @ 07:01  -  03-26-19 @ 07:00  --------------------------------------------------------  IN: 820 mL / OUT: 200 mL / NET: 620 mL      Physical Exam:  	Gen: NAD  	refusing PE       LABS/STUDIES  --------------------------------------------------------------------------------              8.7    5.26  >-----------<  258      [03-25-19 @ 06:59]              27.6     136  |  102  |  54  ----------------------------<  92      [03-25-19 @ 06:59]  4.3   |  22  |  4.1        Ca     9.3     [03-25-19 @ 06:59]        PTT: 28.9       [03-25-19 @ 06:59]      Creatinine Trend:  SCr 4.1 [03-25 @ 06:59]  SCr 3.9 [03-24 @ 02:02]  SCr 4.2 [03-22 @ 12:56]    Urinalysis - [03-22-19 @ 14:04]      Color Yellow / Appearance Cloudy / SG 1.015 / pH 6.0      Gluc Negative / Ketone Negative  / Bili Negative / Urobili 0.2       Blood Negative / Protein 100 / Leuk Est Negative / Nitrite Negative      RBC  / WBC  / Hyaline  / Gran  / Sq Epi  / Non Sq Epi Occasional / Bacteria       Iron 76, TIBC 148, %sat 51      [03-24-19 @ 02:02]  Ferritin 321      [03-24-19 @ 02:02]  PTH -- (Ca 9.9)      [09-08-18 @ 08:49]   281  TSH 2.68      [03-23-19 @ 16:28]  Lipid: chol 188, , HDL 80, LDL 86      [09-08-18 @ 08:49]

## 2019-03-26 NOTE — PROGRESS NOTE ADULT - REASON FOR ADMISSION
altered mental status and a blood pressure of 80/50
Non STEMI
altered mental status and a blood pressure of 80/50

## 2019-03-28 LAB
CULTURE RESULTS: SIGNIFICANT CHANGE UP
CULTURE RESULTS: SIGNIFICANT CHANGE UP
SPECIMEN SOURCE: SIGNIFICANT CHANGE UP
SPECIMEN SOURCE: SIGNIFICANT CHANGE UP

## 2019-03-29 DIAGNOSIS — G30.9 ALZHEIMER'S DISEASE, UNSPECIFIED: ICD-10-CM

## 2019-03-29 DIAGNOSIS — F32.9 MAJOR DEPRESSIVE DISORDER, SINGLE EPISODE, UNSPECIFIED: ICD-10-CM

## 2019-03-29 DIAGNOSIS — I21.4 NON-ST ELEVATION (NSTEMI) MYOCARDIAL INFARCTION: ICD-10-CM

## 2019-03-29 DIAGNOSIS — I12.9 HYPERTENSIVE CHRONIC KIDNEY DISEASE WITH STAGE 1 THROUGH STAGE 4 CHRONIC KIDNEY DISEASE, OR UNSPECIFIED CHRONIC KIDNEY DISEASE: ICD-10-CM

## 2019-03-29 DIAGNOSIS — I48.91 UNSPECIFIED ATRIAL FIBRILLATION: ICD-10-CM

## 2019-03-29 DIAGNOSIS — E55.9 VITAMIN D DEFICIENCY, UNSPECIFIED: ICD-10-CM

## 2019-03-29 DIAGNOSIS — I95.9 HYPOTENSION, UNSPECIFIED: ICD-10-CM

## 2019-03-29 DIAGNOSIS — D63.1 ANEMIA IN CHRONIC KIDNEY DISEASE: ICD-10-CM

## 2019-03-29 DIAGNOSIS — E11.22 TYPE 2 DIABETES MELLITUS WITH DIABETIC CHRONIC KIDNEY DISEASE: ICD-10-CM

## 2019-03-29 DIAGNOSIS — Z79.84 LONG TERM (CURRENT) USE OF ORAL HYPOGLYCEMIC DRUGS: ICD-10-CM

## 2019-03-29 DIAGNOSIS — F02.80 DEMENTIA IN OTHER DISEASES CLASSIFIED ELSEWHERE, UNSPECIFIED SEVERITY, WITHOUT BEHAVIORAL DISTURBANCE, PSYCHOTIC DISTURBANCE, MOOD DISTURBANCE, AND ANXIETY: ICD-10-CM

## 2019-03-29 DIAGNOSIS — N18.4 CHRONIC KIDNEY DISEASE, STAGE 4 (SEVERE): ICD-10-CM

## 2019-03-29 DIAGNOSIS — E78.5 HYPERLIPIDEMIA, UNSPECIFIED: ICD-10-CM

## 2019-04-01 ENCOUNTER — OUTPATIENT (OUTPATIENT)
Dept: OUTPATIENT SERVICES | Facility: HOSPITAL | Age: 69
LOS: 1 days | Discharge: HOME | End: 2019-04-01

## 2019-04-01 DIAGNOSIS — N18.9 CHRONIC KIDNEY DISEASE, UNSPECIFIED: ICD-10-CM

## 2019-04-15 ENCOUNTER — OUTPATIENT (OUTPATIENT)
Dept: OUTPATIENT SERVICES | Facility: HOSPITAL | Age: 69
LOS: 1 days | Discharge: HOME | End: 2019-04-15

## 2019-04-15 DIAGNOSIS — I10 ESSENTIAL (PRIMARY) HYPERTENSION: ICD-10-CM

## 2019-04-15 DIAGNOSIS — D64.9 ANEMIA, UNSPECIFIED: ICD-10-CM

## 2019-05-06 ENCOUNTER — OUTPATIENT (OUTPATIENT)
Dept: OUTPATIENT SERVICES | Facility: HOSPITAL | Age: 69
LOS: 1 days | Discharge: HOME | End: 2019-05-06

## 2019-05-06 DIAGNOSIS — N18.9 CHRONIC KIDNEY DISEASE, UNSPECIFIED: ICD-10-CM

## 2019-05-06 DIAGNOSIS — D63.1 ANEMIA IN CHRONIC KIDNEY DISEASE: ICD-10-CM

## 2019-05-06 DIAGNOSIS — E11.9 TYPE 2 DIABETES MELLITUS WITHOUT COMPLICATIONS: ICD-10-CM

## 2019-06-03 ENCOUNTER — OUTPATIENT (OUTPATIENT)
Dept: OUTPATIENT SERVICES | Facility: HOSPITAL | Age: 69
LOS: 1 days | Discharge: HOME | End: 2019-06-03

## 2019-06-03 DIAGNOSIS — N18.9 CHRONIC KIDNEY DISEASE, UNSPECIFIED: ICD-10-CM

## 2019-07-01 ENCOUNTER — OUTPATIENT (OUTPATIENT)
Dept: OUTPATIENT SERVICES | Facility: HOSPITAL | Age: 69
LOS: 1 days | Discharge: HOME | End: 2019-07-01

## 2019-07-01 DIAGNOSIS — N18.9 CHRONIC KIDNEY DISEASE, UNSPECIFIED: ICD-10-CM

## 2019-08-05 ENCOUNTER — OUTPATIENT (OUTPATIENT)
Dept: OUTPATIENT SERVICES | Facility: HOSPITAL | Age: 69
LOS: 1 days | Discharge: HOME | End: 2019-08-05

## 2019-08-05 DIAGNOSIS — N18.9 CHRONIC KIDNEY DISEASE, UNSPECIFIED: ICD-10-CM

## 2019-09-02 ENCOUNTER — OUTPATIENT (OUTPATIENT)
Dept: OUTPATIENT SERVICES | Facility: HOSPITAL | Age: 69
LOS: 1 days | Discharge: HOME | End: 2019-09-02

## 2019-09-02 DIAGNOSIS — N18.9 CHRONIC KIDNEY DISEASE, UNSPECIFIED: ICD-10-CM

## 2019-10-07 ENCOUNTER — OUTPATIENT (OUTPATIENT)
Dept: OUTPATIENT SERVICES | Facility: HOSPITAL | Age: 69
LOS: 1 days | Discharge: HOME | End: 2019-10-07

## 2019-10-07 DIAGNOSIS — N18.9 CHRONIC KIDNEY DISEASE, UNSPECIFIED: ICD-10-CM

## 2019-11-04 ENCOUNTER — OUTPATIENT (OUTPATIENT)
Dept: OUTPATIENT SERVICES | Facility: HOSPITAL | Age: 69
LOS: 1 days | Discharge: HOME | End: 2019-11-04

## 2019-11-04 DIAGNOSIS — N18.9 CHRONIC KIDNEY DISEASE, UNSPECIFIED: ICD-10-CM

## 2019-11-27 ENCOUNTER — OUTPATIENT (OUTPATIENT)
Dept: OUTPATIENT SERVICES | Facility: HOSPITAL | Age: 69
LOS: 1 days | Discharge: HOME | End: 2019-11-27

## 2019-11-27 DIAGNOSIS — N39.0 URINARY TRACT INFECTION, SITE NOT SPECIFIED: ICD-10-CM

## 2019-12-02 ENCOUNTER — OUTPATIENT (OUTPATIENT)
Dept: OUTPATIENT SERVICES | Facility: HOSPITAL | Age: 69
LOS: 1 days | Discharge: HOME | End: 2019-12-02

## 2019-12-02 DIAGNOSIS — N18.9 CHRONIC KIDNEY DISEASE, UNSPECIFIED: ICD-10-CM

## 2020-04-06 ENCOUNTER — INPATIENT (INPATIENT)
Facility: HOSPITAL | Age: 70
LOS: 7 days | End: 2020-04-14
Attending: INTERNAL MEDICINE | Admitting: INTERNAL MEDICINE
Payer: MEDICARE

## 2020-04-06 VITALS
HEART RATE: 98 BPM | OXYGEN SATURATION: 99 % | TEMPERATURE: 97 F | DIASTOLIC BLOOD PRESSURE: 107 MMHG | RESPIRATION RATE: 18 BRPM | SYSTOLIC BLOOD PRESSURE: 181 MMHG

## 2020-04-06 DIAGNOSIS — D50.9 IRON DEFICIENCY ANEMIA, UNSPECIFIED: ICD-10-CM

## 2020-04-06 DIAGNOSIS — Z66 DO NOT RESUSCITATE: ICD-10-CM

## 2020-04-06 DIAGNOSIS — I48.91 UNSPECIFIED ATRIAL FIBRILLATION: ICD-10-CM

## 2020-04-06 DIAGNOSIS — I16.0 HYPERTENSIVE URGENCY: ICD-10-CM

## 2020-04-06 DIAGNOSIS — R00.0 TACHYCARDIA, UNSPECIFIED: ICD-10-CM

## 2020-04-06 DIAGNOSIS — R79.9 ABNORMAL FINDING OF BLOOD CHEMISTRY, UNSPECIFIED: ICD-10-CM

## 2020-04-06 DIAGNOSIS — R63.8 OTHER SYMPTOMS AND SIGNS CONCERNING FOOD AND FLUID INTAKE: ICD-10-CM

## 2020-04-06 DIAGNOSIS — F32.9 MAJOR DEPRESSIVE DISORDER, SINGLE EPISODE, UNSPECIFIED: ICD-10-CM

## 2020-04-06 DIAGNOSIS — I12.0 HYPERTENSIVE CHRONIC KIDNEY DISEASE WITH STAGE 5 CHRONIC KIDNEY DISEASE OR END STAGE RENAL DISEASE: ICD-10-CM

## 2020-04-06 DIAGNOSIS — U07.1 COVID-19: ICD-10-CM

## 2020-04-06 DIAGNOSIS — J96.01 ACUTE RESPIRATORY FAILURE WITH HYPOXIA: ICD-10-CM

## 2020-04-06 DIAGNOSIS — J12.89 OTHER VIRAL PNEUMONIA: ICD-10-CM

## 2020-04-06 DIAGNOSIS — N17.9 ACUTE KIDNEY FAILURE, UNSPECIFIED: ICD-10-CM

## 2020-04-06 DIAGNOSIS — Z51.5 ENCOUNTER FOR PALLIATIVE CARE: ICD-10-CM

## 2020-04-06 DIAGNOSIS — E87.2 ACIDOSIS: ICD-10-CM

## 2020-04-06 DIAGNOSIS — N18.6 END STAGE RENAL DISEASE: ICD-10-CM

## 2020-04-06 DIAGNOSIS — F03.90 UNSPECIFIED DEMENTIA WITHOUT BEHAVIORAL DISTURBANCE: ICD-10-CM

## 2020-04-06 DIAGNOSIS — E87.5 HYPERKALEMIA: ICD-10-CM

## 2020-04-06 DIAGNOSIS — Z78.1 PHYSICAL RESTRAINT STATUS: ICD-10-CM

## 2020-04-06 LAB
ALBUMIN SERPL ELPH-MCNC: 3.1 G/DL — LOW (ref 3.5–5.2)
ALP SERPL-CCNC: 49 U/L — SIGNIFICANT CHANGE UP (ref 30–115)
ALT FLD-CCNC: 25 U/L — SIGNIFICANT CHANGE UP (ref 0–41)
ANION GAP SERPL CALC-SCNC: 24 MMOL/L — HIGH (ref 7–14)
AST SERPL-CCNC: 60 U/L — HIGH (ref 0–41)
BASE EXCESS BLDV CALC-SCNC: -7 MMOL/L — LOW (ref -2–2)
BASOPHILS # BLD AUTO: 0.01 K/UL — SIGNIFICANT CHANGE UP (ref 0–0.2)
BASOPHILS NFR BLD AUTO: 0.1 % — SIGNIFICANT CHANGE UP (ref 0–1)
BILIRUB SERPL-MCNC: 0.3 MG/DL — SIGNIFICANT CHANGE UP (ref 0.2–1.2)
BUN SERPL-MCNC: 134 MG/DL — CRITICAL HIGH (ref 10–20)
CA-I SERPL-SCNC: 1.25 MMOL/L — SIGNIFICANT CHANGE UP (ref 1.12–1.3)
CALCIUM SERPL-MCNC: 8.7 MG/DL — SIGNIFICANT CHANGE UP (ref 8.5–10.1)
CHLORIDE SERPL-SCNC: 103 MMOL/L — SIGNIFICANT CHANGE UP (ref 98–110)
CO2 SERPL-SCNC: 13 MMOL/L — LOW (ref 17–32)
CREAT SERPL-MCNC: 8.3 MG/DL — CRITICAL HIGH (ref 0.7–1.5)
EOSINOPHIL # BLD AUTO: 0 K/UL — SIGNIFICANT CHANGE UP (ref 0–0.7)
EOSINOPHIL NFR BLD AUTO: 0 % — SIGNIFICANT CHANGE UP (ref 0–8)
GAS PNL BLDV: 143 MMOL/L — SIGNIFICANT CHANGE UP (ref 136–145)
GAS PNL BLDV: SIGNIFICANT CHANGE UP
GLUCOSE SERPL-MCNC: 97 MG/DL — SIGNIFICANT CHANGE UP (ref 70–99)
HCO3 BLDV-SCNC: 19 MMOL/L — LOW (ref 22–29)
HCT VFR BLD CALC: 46.6 % — SIGNIFICANT CHANGE UP (ref 37–47)
HCT VFR BLDA CALC: 44.1 % — HIGH (ref 34–44)
HGB BLD CALC-MCNC: 14.4 G/DL — SIGNIFICANT CHANGE UP (ref 14–18)
HGB BLD-MCNC: 15 G/DL — SIGNIFICANT CHANGE UP (ref 12–16)
IMM GRANULOCYTES NFR BLD AUTO: 1.3 % — HIGH (ref 0.1–0.3)
LACTATE BLDV-MCNC: 1.8 MMOL/L — HIGH (ref 0.5–1.6)
LYMPHOCYTES # BLD AUTO: 1.03 K/UL — LOW (ref 1.2–3.4)
LYMPHOCYTES # BLD AUTO: 10.9 % — LOW (ref 20.5–51.1)
MCHC RBC-ENTMCNC: 31.5 PG — HIGH (ref 27–31)
MCHC RBC-ENTMCNC: 32.2 G/DL — SIGNIFICANT CHANGE UP (ref 32–37)
MCV RBC AUTO: 97.9 FL — SIGNIFICANT CHANGE UP (ref 81–99)
MONOCYTES # BLD AUTO: 0.39 K/UL — SIGNIFICANT CHANGE UP (ref 0.1–0.6)
MONOCYTES NFR BLD AUTO: 4.1 % — SIGNIFICANT CHANGE UP (ref 1.7–9.3)
NEUTROPHILS # BLD AUTO: 7.91 K/UL — HIGH (ref 1.4–6.5)
NEUTROPHILS NFR BLD AUTO: 83.6 % — HIGH (ref 42.2–75.2)
NRBC # BLD: 0 /100 WBCS — SIGNIFICANT CHANGE UP (ref 0–0)
PCO2 BLDV: 40 MMHG — LOW (ref 41–51)
PH BLDV: 7.29 — SIGNIFICANT CHANGE UP (ref 7.26–7.43)
PLATELET # BLD AUTO: 177 K/UL — SIGNIFICANT CHANGE UP (ref 130–400)
PO2 BLDV: 34 MMHG — SIGNIFICANT CHANGE UP (ref 20–40)
POTASSIUM BLDV-SCNC: 4.2 MMOL/L — SIGNIFICANT CHANGE UP (ref 3.3–5.6)
POTASSIUM SERPL-MCNC: 7.5 MMOL/L — CRITICAL HIGH (ref 3.5–5)
POTASSIUM SERPL-SCNC: 7.5 MMOL/L — CRITICAL HIGH (ref 3.5–5)
PROT SERPL-MCNC: 8.7 G/DL — HIGH (ref 6–8)
RBC # BLD: 4.76 M/UL — SIGNIFICANT CHANGE UP (ref 4.2–5.4)
RBC # FLD: 13.6 % — SIGNIFICANT CHANGE UP (ref 11.5–14.5)
SAO2 % BLDV: 52 % — SIGNIFICANT CHANGE UP
SODIUM SERPL-SCNC: 140 MMOL/L — SIGNIFICANT CHANGE UP (ref 135–146)
WBC # BLD: 9.46 K/UL — SIGNIFICANT CHANGE UP (ref 4.8–10.8)
WBC # FLD AUTO: 9.46 K/UL — SIGNIFICANT CHANGE UP (ref 4.8–10.8)

## 2020-04-06 PROCEDURE — 71045 X-RAY EXAM CHEST 1 VIEW: CPT | Mod: 26

## 2020-04-06 PROCEDURE — 93010 ELECTROCARDIOGRAM REPORT: CPT

## 2020-04-06 PROCEDURE — 99291 CRITICAL CARE FIRST HOUR: CPT

## 2020-04-06 RX ORDER — ASPIRIN/CALCIUM CARB/MAGNESIUM 324 MG
81 TABLET ORAL DAILY
Refills: 0 | Status: DISCONTINUED | OUTPATIENT
Start: 2020-04-06 | End: 2020-04-14

## 2020-04-06 RX ORDER — ACETAMINOPHEN 500 MG
650 TABLET ORAL EVERY 6 HOURS
Refills: 0 | Status: DISCONTINUED | OUTPATIENT
Start: 2020-04-06 | End: 2020-04-14

## 2020-04-06 RX ORDER — SODIUM BICARBONATE 1 MEQ/ML
50 SYRINGE (ML) INTRAVENOUS ONCE
Refills: 0 | Status: COMPLETED | OUTPATIENT
Start: 2020-04-06 | End: 2020-04-06

## 2020-04-06 RX ORDER — HEPARIN SODIUM 5000 [USP'U]/ML
5000 INJECTION INTRAVENOUS; SUBCUTANEOUS EVERY 8 HOURS
Refills: 0 | Status: DISCONTINUED | OUTPATIENT
Start: 2020-04-06 | End: 2020-04-14

## 2020-04-06 RX ORDER — DEXTROSE 50 % IN WATER 50 %
50 SYRINGE (ML) INTRAVENOUS ONCE
Refills: 0 | Status: DISCONTINUED | OUTPATIENT
Start: 2020-04-06 | End: 2020-04-06

## 2020-04-06 RX ORDER — NIFEDIPINE 30 MG
30 TABLET, EXTENDED RELEASE 24 HR ORAL DAILY
Refills: 0 | Status: DISCONTINUED | OUTPATIENT
Start: 2020-04-06 | End: 2020-04-14

## 2020-04-06 RX ORDER — SODIUM BICARBONATE 1 MEQ/ML
0.25 SYRINGE (ML) INTRAVENOUS
Qty: 150 | Refills: 0 | Status: DISCONTINUED | OUTPATIENT
Start: 2020-04-06 | End: 2020-04-07

## 2020-04-06 RX ORDER — METOPROLOL TARTRATE 50 MG
25 TABLET ORAL
Refills: 0 | Status: DISCONTINUED | OUTPATIENT
Start: 2020-04-06 | End: 2020-04-14

## 2020-04-06 RX ORDER — HYDRALAZINE HCL 50 MG
50 TABLET ORAL EVERY 8 HOURS
Refills: 0 | Status: DISCONTINUED | OUTPATIENT
Start: 2020-04-06 | End: 2020-04-14

## 2020-04-06 RX ORDER — LABETALOL HCL 100 MG
20 TABLET ORAL ONCE
Refills: 0 | Status: COMPLETED | OUTPATIENT
Start: 2020-04-06 | End: 2020-04-06

## 2020-04-06 RX ORDER — DEXTROSE 50 % IN WATER 50 %
25 SYRINGE (ML) INTRAVENOUS ONCE
Refills: 0 | Status: DISCONTINUED | OUTPATIENT
Start: 2020-04-06 | End: 2020-04-14

## 2020-04-06 RX ORDER — SODIUM POLYSTYRENE SULFONATE 4.1 MEQ/G
30 POWDER, FOR SUSPENSION ORAL ONCE
Refills: 0 | Status: COMPLETED | OUTPATIENT
Start: 2020-04-06 | End: 2020-04-06

## 2020-04-06 RX ORDER — GLUCAGON INJECTION, SOLUTION 0.5 MG/.1ML
1 INJECTION, SOLUTION SUBCUTANEOUS ONCE
Refills: 0 | Status: DISCONTINUED | OUTPATIENT
Start: 2020-04-06 | End: 2020-04-14

## 2020-04-06 RX ORDER — ALBUTEROL 90 UG/1
2.5 AEROSOL, METERED ORAL ONCE
Refills: 0 | Status: COMPLETED | OUTPATIENT
Start: 2020-04-06 | End: 2020-04-06

## 2020-04-06 RX ORDER — HYDRALAZINE HCL 50 MG
75 TABLET ORAL ONCE
Refills: 0 | Status: COMPLETED | OUTPATIENT
Start: 2020-04-06 | End: 2020-04-06

## 2020-04-06 RX ORDER — INSULIN LISPRO 100/ML
VIAL (ML) SUBCUTANEOUS
Refills: 0 | Status: DISCONTINUED | OUTPATIENT
Start: 2020-04-06 | End: 2020-04-14

## 2020-04-06 RX ORDER — ATORVASTATIN CALCIUM 80 MG/1
40 TABLET, FILM COATED ORAL AT BEDTIME
Refills: 0 | Status: DISCONTINUED | OUTPATIENT
Start: 2020-04-06 | End: 2020-04-14

## 2020-04-06 RX ORDER — DEXTROSE 10 % IN WATER 10 %
500 INTRAVENOUS SOLUTION INTRAVENOUS ONCE
Refills: 0 | Status: COMPLETED | OUTPATIENT
Start: 2020-04-06 | End: 2020-04-06

## 2020-04-06 RX ORDER — SODIUM CHLORIDE 9 MG/ML
1000 INJECTION, SOLUTION INTRAVENOUS
Refills: 0 | Status: DISCONTINUED | OUTPATIENT
Start: 2020-04-06 | End: 2020-04-14

## 2020-04-06 RX ORDER — CLOPIDOGREL BISULFATE 75 MG/1
75 TABLET, FILM COATED ORAL DAILY
Refills: 0 | Status: DISCONTINUED | OUTPATIENT
Start: 2020-04-06 | End: 2020-04-14

## 2020-04-06 RX ORDER — DEXTROSE 50 % IN WATER 50 %
12.5 SYRINGE (ML) INTRAVENOUS ONCE
Refills: 0 | Status: DISCONTINUED | OUTPATIENT
Start: 2020-04-06 | End: 2020-04-14

## 2020-04-06 RX ORDER — INSULIN HUMAN 100 [IU]/ML
10 INJECTION, SOLUTION SUBCUTANEOUS ONCE
Refills: 0 | Status: COMPLETED | OUTPATIENT
Start: 2020-04-06 | End: 2020-04-06

## 2020-04-06 RX ORDER — DEXTROSE 50 % IN WATER 50 %
15 SYRINGE (ML) INTRAVENOUS ONCE
Refills: 0 | Status: DISCONTINUED | OUTPATIENT
Start: 2020-04-06 | End: 2020-04-14

## 2020-04-06 RX ORDER — CALCIUM GLUCONATE 100 MG/ML
1 VIAL (ML) INTRAVENOUS ONCE
Refills: 0 | Status: COMPLETED | OUTPATIENT
Start: 2020-04-06 | End: 2020-04-06

## 2020-04-06 RX ORDER — ALBUTEROL 90 UG/1
2 AEROSOL, METERED ORAL ONCE
Refills: 0 | Status: COMPLETED | OUTPATIENT
Start: 2020-04-06 | End: 2020-04-06

## 2020-04-06 RX ORDER — SODIUM CHLORIDE 9 MG/ML
1000 INJECTION, SOLUTION INTRAVENOUS ONCE
Refills: 0 | Status: COMPLETED | OUTPATIENT
Start: 2020-04-06 | End: 2020-04-06

## 2020-04-06 RX ORDER — METOPROLOL TARTRATE 50 MG
25 TABLET ORAL ONCE
Refills: 0 | Status: DISCONTINUED | OUTPATIENT
Start: 2020-04-06 | End: 2020-04-06

## 2020-04-06 RX ORDER — SODIUM CHLORIDE 9 MG/ML
1000 INJECTION INTRAMUSCULAR; INTRAVENOUS; SUBCUTANEOUS
Refills: 0 | Status: DISCONTINUED | OUTPATIENT
Start: 2020-04-06 | End: 2020-04-07

## 2020-04-06 RX ADMIN — Medication 20 MILLIGRAM(S): at 18:41

## 2020-04-06 RX ADMIN — Medication 1 MILLILITER(S): at 17:10

## 2020-04-06 RX ADMIN — Medication 100 MEQ/KG/HR: at 21:23

## 2020-04-06 RX ADMIN — Medication 50 MILLIEQUIVALENT(S): at 17:10

## 2020-04-06 RX ADMIN — Medication 75 MILLIGRAM(S): at 21:23

## 2020-04-06 RX ADMIN — ATORVASTATIN CALCIUM 40 MILLIGRAM(S): 80 TABLET, FILM COATED ORAL at 23:39

## 2020-04-06 RX ADMIN — ALBUTEROL 2 PUFF(S): 90 AEROSOL, METERED ORAL at 17:11

## 2020-04-06 RX ADMIN — INSULIN HUMAN 10 UNIT(S): 100 INJECTION, SOLUTION SUBCUTANEOUS at 17:10

## 2020-04-06 RX ADMIN — HEPARIN SODIUM 5000 UNIT(S): 5000 INJECTION INTRAVENOUS; SUBCUTANEOUS at 23:39

## 2020-04-06 RX ADMIN — Medication 100 GRAM(S): at 17:11

## 2020-04-06 RX ADMIN — SODIUM CHLORIDE 1000 MILLILITER(S): 9 INJECTION, SOLUTION INTRAVENOUS at 15:46

## 2020-04-06 NOTE — ED PROVIDER NOTE - ATTENDING CONTRIBUTION TO CARE
69 year old female, pmhx as documented above, presenting with worsening BUN/creatinine as outpatient. Patient is non-verbal at baseline and unable to provide further history - sent in from [facility]. Also of note per NH papers, patient with cough x 3 days. Patient unable to provide further history.    Vital Signs: I have reviewed the initial vital signs.  Constitutional: NAD, well-nourished, appears stated age, no acute distress.  HEENT: Airway patent, moist MM, no erythema/swelling/deformity of oral structures. EOMI, PERRLA.  CV: regular rate, regular rhythm, well-perfused extremities, 2+ b/l DP and radial pulses equal.  Lungs: (+) bilateral rhonchi, no increased WOB.  ABD: NTND, no guarding or rebound, no pulsatile mass, no hernias.   MSK: Neck supple, nontender, nl ROM, no stepoff. Chest nontender. Back nontender in TLS spine or to b/l bony structures or flanks. Ext nontender, nl rom, no deformity.   INTEG: Skin warm, dry, no rash.  NEURO: does not follow commands but moves all extremities  PSYCH: non-verbal    Will obtain labs, EKG, start IVF, re-eval.

## 2020-04-06 NOTE — ED PROVIDER NOTE - PHYSICAL EXAMINATION
Gen: NAD, AOx3  Head: NCAT  HEENT: PERRL, oral mucosa moist, normal conjunctiva, oropharynx clear without exudate or erythema  Lung: CTAB, no respiratory distress, no wheezing, rales, rhonchi  CV: normal s1/s2, rrr, Normal perfusion, pulses 2+ throughout  Abd: soft, NTND, no CVA tenderness  MSK: No edema, no visible deformities, full range of motion in all 4 extremities  Neuro: nonverbal/does not follow commands at baseline  Skin: No rash   Psych: normal affect

## 2020-04-06 NOTE — ED PROVIDER NOTE - CARE PLAN
Principal Discharge DX:	Hyperkalemia  Secondary Diagnosis:	Cough Principal Discharge DX:	Renal failure (ARF), acute on chronic  Secondary Diagnosis:	Hyperkalemia  Secondary Diagnosis:	Cough

## 2020-04-06 NOTE — H&P ADULT - NSHPSOCIALHISTORY_GEN_ALL_CORE
Lives at [facility] [facility]  Unknown if pt ever smoked. No alcohol or illicit drug use.  Worked as a nurse in the past.     Baseline: mostly non verbal, sometimes does speak a few words in [place] and can for most part understand as per the NH staff. Pt has baseline dementia.

## 2020-04-06 NOTE — H&P ADULT - NSHPLABSRESULTS_GEN_ALL_CORE
15.0   9.46  )-----------( 177      ( 06 Apr 2020 15:55 )             46.6       04-06    140  |  103  |  134<HH>  ----------------------------<  97  7.5<HH>   |  13<L>  |  8.3<HH>    Ca    8.7      06 Apr 2020 15:55    TPro  8.7<H>  /  Alb  3.1<L>  /  TBili  0.3  /  DBili  x   /  AST  60<H>  /  ALT  25  /  AlkPhos  49  04-06    Blood Gas Venous - Potassium: 4.2 mmoL/L (04.06.20 @ 18:41)  Blood Gas Venous - Lactate: 1.8 mmoL/L (04.06.20 @ 18:41)    Blood Gas Profile - Venous (04.06.20 @ 18:41)    pH, Venous: 7.29    pCO2, Venous: 40 mmHg    pO2, Venous: 34 mmHg    HCO3, Venous: 19 mmoL/L    Base Excess, Venous: -7.0 mmoL/L    Oxygen Saturation, Venous: 52 %    < from: 12 Lead ECG (04.06.20 @ 18:06) >    Ventricular Rate 108 BPM  Atrial Rate 108 BPM  P-R Interval 152 ms  QRS Duration 88 ms  Q-T Interval 350 ms  QTC Calculation(Bezet) 469 ms  P Axis 67 degrees  R Axis -39 degrees  T Axis 99 degrees    Diagnosis Line Sinus tachycardia  Left axis deviation  Possible Anterior infarct , age undetermined  T wave abnormality, consider lateral ischemia  Abnormal ECG

## 2020-04-06 NOTE — H&P ADULT - HISTORY OF PRESENT ILLNESS
70 yo female  PMHx: T2DM, HTN, CKD unknown baseline), LUCIANO, dementia, dyslipidemia, Afib (not on AC), depression  CC: Abnormal labs; Non productive cough  History obtained from NH personnel at [place] [facility] and pt's son aCleb dash. Pt at baseline is mostly non verbal, sometimes does speak a few words in Pashto and can for most part understand as per the NH staff. Pt has baseline dementia.   As per the NH staff, pt was sent to the hospital for abnormal labs including high creatinine and for evaluation of non productive cough for the past 3 days. NH staff denied any fever, shortness of breath, abdominal pain, nausea/vomiting. Pt's son said that pt has been having very poor PO intake and has been spitting out food and not eating at all. As per the son, pt has low kidney function but has been enough till now to avoid dialysis. The son said that they would like to avoid dialysis as much as possible until it is imminent.   ER Course: Vitals on admission were /107, HR 98, T 97.4F, SpO2 99% on 2L NC O2. Labs were significant for Cr of 8.3 with  and eGFR of 4. K was 7.5 which was a hemolyzed sample and was 4.2 on repeat VBG. EKG showed sinus tachycardia with QTC of 469.

## 2020-04-06 NOTE — ED PROVIDER NOTE - CLINICAL SUMMARY MEDICAL DECISION MAKING FREE TEXT BOX
Patient presented with acute on chronic worsening renal failure. Otherwise on arrival patient HD stable, but non-verbal and history limited. Obtained EKG which was non-ischemic without evidence of hyperkalemia. Initial labs showed (+) metabolic acidosis which is likely 2/2 acute on chronic renal failure. , Cr 8.3, pH 7.29. Also hyperkalemic, although this was hemolyzed. Treated prophylactically for hyperkalemia - VBG subsequently showed normal potassium. Consulted nephrology who recommended bicarb gtt and they will follow. Patient was poor dialysis candidate in the past per old records. Consulted ICU who cleared patient for telemetry admission. HD stable at time of admission.

## 2020-04-06 NOTE — CONSULT NOTE ADULT - ASSESSMENT
68 yo female with a pmh od CKD (stage 5 per son  kidneys at "7-8 %" no plan for Hd" , HTN, and dementia present for elevated BUN/creatine.     Pt has advanced CKD at baseline mentioned above spoke to son, in agreement would be poor HD candidate, want to avoid HD  Agree w/ IVF switch to bicarb drip   ideally place a patterson to monitor UOP though might not be practical   check UA   HyperK noted, hemolyzed   give Kayexalate check blood gas  Bp very elevated s/p labetalol may need nitro drip

## 2020-04-06 NOTE — ED PROVIDER NOTE - PMH
Afib    Anemia    CKD (chronic kidney disease)    Dementia    Depression    DM (diabetes mellitus), secondary    Dyslipidemia    HTN (hypertension)

## 2020-04-06 NOTE — ED PROVIDER NOTE - PROGRESS NOTE DETAILS
Case discussed with nephrology given labs - they will come see patient. Spoke with ICU - will await nephrology recs prior to accepting. If nephrology wants to dialyze, will take to ICU. If not - admit to tele. Case discussed with nephrology Dr. Arechiga - recommending bicarb gtt and will re-eval in the AM to determine if dialysis necessary.

## 2020-04-06 NOTE — H&P ADULT - ASSESSMENT
70 yo female with PMHx: T2DM, HTN, CKD unknown baseline), LUCIANO, dementia, dyslipidemia, Afib (not on AC), depression, presented for evaluation of abnormal labs and nonproductive cough.    #WILIAM on CKD (Unknown baseline)  -As per pt's son, pt has decreased kidney function with recent decreased PO intake due to dementia  -Cr on admission 8.3; Baseline unknown but likely low -Get records from Dr Grajeda office  -BUN/Cr ratio 16 - Intrinsic vs obstructive.   -Check urine lytes; Check FeUrea  -Hold home dose of lasix 20mg   -C/w IVF and bicarb drip as per nephrology.  -Place patterson catheter. Monitor strict I/Os  -Monitor BMP. Check Phosp    #Hyperkalemia  -K 7.5  : HEMOLYZED sample; Repeat on VBG 4.2; No EKG changes  -Will give kayexalate as per nephro. s/p Ca gluconate  -Repeat BMP     #Hypertensive urgency  -BP on admission 181/107  -Will restart home dose of procardia and hydralazine.   -Can add labetalol as needed.     #Nonproductive cough  -No evidence of sepsis on admission  -CXR shows hazy opacities. EKG shws QTC of 469; No hypoxia   -F/u COVID-19 PCR  -Check procalcitonin and CRP  -Supplemental O2 to keep SpO2>92%  -Will not start plaquenil or abx at this time.     #T2DM - Pt not on oral agents. Monitor FS [id number] for now. Insulin sliding scale  #Afib- Unclear why pt not on AC. C/w metoprolol   #Dementia- at baseline  #HLD-c/w statin  #Unclear why pt is on plavix -could not find pt file in ER. NH staff did not know  #LUCIANO- Hb normal     #DVT ppx: heparin sq  #GI ppx: not indicated  #Diet: DASH CC soft  #Actvity: IAT  #FULL CODE-d/w son   #Dispo: tele

## 2020-04-06 NOTE — H&P ADULT - NSHPREVIEWOFSYSTEMS_GEN_ALL_CORE
ROS: As per HPI    Vital Signs Last 24 Hrs  T(C): 36.4 (06 Apr 2020 20:48), Max: 36.7 (06 Apr 2020 15:51)  T(F): 97.6 (06 Apr 2020 20:48), Max: 98 (06 Apr 2020 15:51)  HR: 76 (06 Apr 2020 20:48) (64 - 98)  BP: 191/118 (06 Apr 2020 20:48) (164/110 - 221/132)  RR: 18 (06 Apr 2020 20:48) (18 - 19)  SpO2: 100% (06 Apr 2020 20:48) (96% - 100%)

## 2020-04-06 NOTE — ED PROVIDER NOTE - OBJECTIVE STATEMENT
68 yo male with a pmh od CKD, HTN, and dementia present for elevated BUN/creatine. pt is nonverbal/does not follow commands at baseline. spoke with nurse at care facility, [facility], who states she was sent in by her pcp Dr. Grajeda for fluids. she also noted to have a nonproductive cough for 3 days and facility denies fevers, n/v/d, or respiratory distress. 70 yo female with a pmh od CKD, HTN, and dementia present for elevated BUN/creatine. pt is nonverbal/does not follow commands at baseline. spoke with nurse at care facility, [facility], who states she was sent in by her pcp Dr. Grajeda for fluids. she also noted to have a nonproductive cough for 3 days and facility denies fevers, n/v/d, or respiratory distress.

## 2020-04-06 NOTE — ED ADULT NURSE REASSESSMENT NOTE - NS ED NURSE REASSESS COMMENT FT1
Received patient from  previous RN, patient is awake , alert but confused. Patient is R/O covid. Isolation precautions maintained. Safety maintained. Awaiting on medication from Pharmacy. Will continue to monitor.

## 2020-04-06 NOTE — H&P ADULT - NSHPPHYSICALEXAM_GEN_ALL_CORE
Gen: NAD  Head: NCAT  HEENT: PERRL, oral mucosa moist, normal conjunctiva, oropharynx clear without exudate or erythema  Lung: CTAB, no respiratory distress, no wheezing, rales, rhonchi  CV: normal s1/s2, rrr, Normal perfusion, pulses 2+ throughout  Abd: soft, NTND, no CVA tenderness  MSK: No edema, no visible deformities, full range of motion in all 4 extremities  Neuro: nonverbal/does not follow commands at baseline  Skin: No rash   Psych: normal affect

## 2020-04-06 NOTE — CONSULT NOTE ADULT - SUBJECTIVE AND OBJECTIVE BOX
NEPHROLOGY CONSULTATION NOTE       70 yo female with a pmh od CKD (stage 5 per son  kidneys at "7-8 %" no plan for Hd" , HTN, and dementia present for elevated BUN/creatine. pt is nonverbal/does not follow commands at baseline. Per chart ED spoke with nurse at care facility, [facility], who states she was sent in by her pcp Dr. Grajeda for fluids. she also noted to have a nonproductive cough for 3 days and facility denies fevers, n/v/d, or respiratory distress.    Spoke to pt's Son Caleb dash [phone] who  confirmed above hx, and  reported that there is no plan for Hd, "we want to avoid dialysis"       PAST MEDICAL & SURGICAL HISTORY:  Dementia  DM (diabetes mellitus), secondary  Dyslipidemia  Anemia  Afib  Depression  HTN (hypertension)  CKD (chronic kidney disease)    Allergies:  No Known Allergies    Home Medications Reviewed  Hospital Medications:   MEDICATIONS  (STANDING):  labetalol Injectable 20 milliGRAM(s) IV Push Once      SOCIAL HISTORY:  Denies ETOH,Smoking,   FAMILY HISTORY:        REVIEW OF SYSTEMS:  unable to obtain    VITALS:  T(F): 96.7 (04-06-20 @ 17:55), Max: 98 (04-06-20 @ 15:51)  HR: 64 (04-06-20 @ 17:55)  BP: 221/132 (04-06-20 @ 17:55)  RR: 19 (04-06-20 @ 17:55)  SpO2: 96% (04-06-20 @ 17:55)        I&O's Detail        PHYSICAL EXAM:  Constitutional: NAD  HEENT: anicteric sclera, oropharynx clear, MMM  Neck: No JVD  Respiratory: CTAB, no wheezes, rales or rhonchi  Cardiovascular: S1, S2, RRR  Gastrointestinal: BS+, soft, NT/ND  Extremities: No cyanosis or clubbing. No peripheral edema  Neurological: confused   Psychiatric: Normal mood, normal affect  : No CVA tenderness. No patterson.   Skin: No rashes  Vascular Access:    LABS:  04-06    140  |  103  |  134<HH>  ----------------------------<  97  7.5<HH>   |  13<L>  |  8.3<HH>    Ca    8.7      06 Apr 2020 15:55    TPro  8.7<H>  /  Alb  3.1<L>  /  TBili  0.3  /  DBili      /  AST  60<H>  /  ALT  25  /  AlkPhos  49  04-06    Creatinine Trend: 8.3 <--                        15.0   9.46  )-----------( 177      ( 06 Apr 2020 15:55 )             46.6     Urine Studies:              RADIOLOGY & ADDITIONAL STUDIES:

## 2020-04-06 NOTE — GOALS OF CARE CONVERSATION - ADVANCED CARE PLANNING - CONVERSATION DETAILS
Discussed with son over the phone about patient's diagnosis and explained to the son that if the kidney function does not improve, patient will need dialysis and given poor baseline functional status, that will lead to more complications.   Pt's son understood that she might need HD but wanted to avoid it until imminent.   Pt's son did have a goals of care conversation with her mother in the past where she expressed to be resusucitated and intubated if needed. The conversation was 10 years ago though.   as per the son now, he wanted the pt to be full code, but if it happened again and resuscitative measures meant doing more harm than good, he would consider DNR/DNI.

## 2020-04-07 LAB
ALBUMIN SERPL ELPH-MCNC: 2.8 G/DL — LOW (ref 3.5–5.2)
ALP SERPL-CCNC: 55 U/L — SIGNIFICANT CHANGE UP (ref 30–115)
ALT FLD-CCNC: 30 U/L — SIGNIFICANT CHANGE UP (ref 0–41)
ANION GAP SERPL CALC-SCNC: 23 MMOL/L — HIGH (ref 7–14)
AST SERPL-CCNC: 58 U/L — HIGH (ref 0–41)
BASOPHILS # BLD AUTO: 0.02 K/UL — SIGNIFICANT CHANGE UP (ref 0–0.2)
BASOPHILS NFR BLD AUTO: 0.2 % — SIGNIFICANT CHANGE UP (ref 0–1)
BILIRUB SERPL-MCNC: 0.3 MG/DL — SIGNIFICANT CHANGE UP (ref 0.2–1.2)
BUN SERPL-MCNC: 129 MG/DL — CRITICAL HIGH (ref 10–20)
CALCIUM SERPL-MCNC: 8.6 MG/DL — SIGNIFICANT CHANGE UP (ref 8.5–10.1)
CHLORIDE SERPL-SCNC: 103 MMOL/L — SIGNIFICANT CHANGE UP (ref 98–110)
CK MB CFR SERPL CALC: 13.1 NG/ML — HIGH (ref 0.6–6.3)
CK SERPL-CCNC: 433 U/L — HIGH (ref 0–225)
CO2 SERPL-SCNC: 19 MMOL/L — SIGNIFICANT CHANGE UP (ref 17–32)
CREAT SERPL-MCNC: 8.8 MG/DL — CRITICAL HIGH (ref 0.7–1.5)
EOSINOPHIL # BLD AUTO: 0 K/UL — SIGNIFICANT CHANGE UP (ref 0–0.7)
EOSINOPHIL NFR BLD AUTO: 0 % — SIGNIFICANT CHANGE UP (ref 0–8)
GAS PNL BLDA: SIGNIFICANT CHANGE UP
GLUCOSE BLDC GLUCOMTR-MCNC: 104 MG/DL — HIGH (ref 70–99)
GLUCOSE BLDC GLUCOMTR-MCNC: 104 MG/DL — HIGH (ref 70–99)
GLUCOSE BLDC GLUCOMTR-MCNC: 129 MG/DL — HIGH (ref 70–99)
GLUCOSE BLDC GLUCOMTR-MCNC: 132 MG/DL — HIGH (ref 70–99)
GLUCOSE SERPL-MCNC: 124 MG/DL — HIGH (ref 70–99)
HCT VFR BLD CALC: 41.4 % — SIGNIFICANT CHANGE UP (ref 37–47)
HGB BLD-MCNC: 14 G/DL — SIGNIFICANT CHANGE UP (ref 12–16)
IMM GRANULOCYTES NFR BLD AUTO: 1.6 % — HIGH (ref 0.1–0.3)
LDH SERPL L TO P-CCNC: 703 — HIGH (ref 50–242)
LYMPHOCYTES # BLD AUTO: 0.62 K/UL — LOW (ref 1.2–3.4)
LYMPHOCYTES # BLD AUTO: 7.6 % — LOW (ref 20.5–51.1)
MAGNESIUM SERPL-MCNC: 2.6 MG/DL — HIGH (ref 1.8–2.4)
MCHC RBC-ENTMCNC: 32.3 PG — HIGH (ref 27–31)
MCHC RBC-ENTMCNC: 33.8 G/DL — SIGNIFICANT CHANGE UP (ref 32–37)
MCV RBC AUTO: 95.4 FL — SIGNIFICANT CHANGE UP (ref 81–99)
MONOCYTES # BLD AUTO: 0.26 K/UL — SIGNIFICANT CHANGE UP (ref 0.1–0.6)
MONOCYTES NFR BLD AUTO: 3.2 % — SIGNIFICANT CHANGE UP (ref 1.7–9.3)
NEUTROPHILS # BLD AUTO: 7.12 K/UL — HIGH (ref 1.4–6.5)
NEUTROPHILS NFR BLD AUTO: 87.4 % — HIGH (ref 42.2–75.2)
NRBC # BLD: 0 /100 WBCS — SIGNIFICANT CHANGE UP (ref 0–0)
PHOSPHATE SERPL-MCNC: 6.4 MG/DL — HIGH (ref 2.1–4.9)
PLATELET # BLD AUTO: 191 K/UL — SIGNIFICANT CHANGE UP (ref 130–400)
POTASSIUM SERPL-MCNC: 4.4 MMOL/L — SIGNIFICANT CHANGE UP (ref 3.5–5)
POTASSIUM SERPL-SCNC: 4.4 MMOL/L — SIGNIFICANT CHANGE UP (ref 3.5–5)
PROT SERPL-MCNC: 7.5 G/DL — SIGNIFICANT CHANGE UP (ref 6–8)
RBC # BLD: 4.34 M/UL — SIGNIFICANT CHANGE UP (ref 4.2–5.4)
RBC # FLD: 13.4 % — SIGNIFICANT CHANGE UP (ref 11.5–14.5)
SARS-COV-2 RNA SPEC QL NAA+PROBE: DETECTED
SODIUM SERPL-SCNC: 145 MMOL/L — SIGNIFICANT CHANGE UP (ref 135–146)
WBC # BLD: 8.15 K/UL — SIGNIFICANT CHANGE UP (ref 4.8–10.8)
WBC # FLD AUTO: 8.15 K/UL — SIGNIFICANT CHANGE UP (ref 4.8–10.8)

## 2020-04-07 RX ORDER — SODIUM CHLORIDE 9 MG/ML
1000 INJECTION, SOLUTION INTRAVENOUS
Refills: 0 | Status: DISCONTINUED | OUTPATIENT
Start: 2020-04-07 | End: 2020-04-08

## 2020-04-07 RX ORDER — ACETAMINOPHEN 500 MG
650 TABLET ORAL EVERY 6 HOURS
Refills: 0 | Status: DISCONTINUED | OUTPATIENT
Start: 2020-04-07 | End: 2020-04-14

## 2020-04-07 RX ORDER — HYDROXYCHLOROQUINE SULFATE 200 MG
200 TABLET ORAL EVERY 12 HOURS
Refills: 0 | Status: COMPLETED | OUTPATIENT
Start: 2020-04-08 | End: 2020-04-12

## 2020-04-07 RX ORDER — HYDROXYCHLOROQUINE SULFATE 200 MG
TABLET ORAL
Refills: 0 | Status: COMPLETED | OUTPATIENT
Start: 2020-04-07 | End: 2020-04-12

## 2020-04-07 RX ORDER — HYDROXYCHLOROQUINE SULFATE 200 MG
400 TABLET ORAL EVERY 12 HOURS
Refills: 0 | Status: COMPLETED | OUTPATIENT
Start: 2020-04-07 | End: 2020-04-08

## 2020-04-07 RX ORDER — LABETALOL HCL 100 MG
100 TABLET ORAL ONCE
Refills: 0 | Status: COMPLETED | OUTPATIENT
Start: 2020-04-07 | End: 2020-04-07

## 2020-04-07 RX ORDER — SODIUM CHLORIDE 9 MG/ML
1000 INJECTION, SOLUTION INTRAVENOUS
Refills: 0 | Status: DISCONTINUED | OUTPATIENT
Start: 2020-04-07 | End: 2020-04-07

## 2020-04-07 RX ADMIN — Medication 50 MILLIGRAM(S): at 22:43

## 2020-04-07 RX ADMIN — CLOPIDOGREL BISULFATE 75 MILLIGRAM(S): 75 TABLET, FILM COATED ORAL at 12:25

## 2020-04-07 RX ADMIN — Medication 81 MILLIGRAM(S): at 12:25

## 2020-04-07 RX ADMIN — SODIUM CHLORIDE 75 MILLILITER(S): 9 INJECTION, SOLUTION INTRAVENOUS at 22:43

## 2020-04-07 RX ADMIN — Medication 50 MILLIGRAM(S): at 05:27

## 2020-04-07 RX ADMIN — ATORVASTATIN CALCIUM 40 MILLIGRAM(S): 80 TABLET, FILM COATED ORAL at 22:44

## 2020-04-07 RX ADMIN — Medication 400 MILLIGRAM(S): at 16:24

## 2020-04-07 RX ADMIN — Medication 50 MILLIGRAM(S): at 12:25

## 2020-04-07 RX ADMIN — HEPARIN SODIUM 5000 UNIT(S): 5000 INJECTION INTRAVENOUS; SUBCUTANEOUS at 05:26

## 2020-04-07 RX ADMIN — Medication 25 MILLIGRAM(S): at 17:14

## 2020-04-07 RX ADMIN — Medication 25 MILLIGRAM(S): at 05:27

## 2020-04-07 RX ADMIN — SODIUM CHLORIDE 75 MILLILITER(S): 9 INJECTION, SOLUTION INTRAVENOUS at 08:59

## 2020-04-07 RX ADMIN — Medication 100 MILLIGRAM(S): at 01:29

## 2020-04-07 RX ADMIN — Medication 650 MILLIGRAM(S): at 02:08

## 2020-04-07 RX ADMIN — Medication 30 MILLIGRAM(S): at 05:27

## 2020-04-07 RX ADMIN — HEPARIN SODIUM 5000 UNIT(S): 5000 INJECTION INTRAVENOUS; SUBCUTANEOUS at 12:25

## 2020-04-07 RX ADMIN — HEPARIN SODIUM 5000 UNIT(S): 5000 INJECTION INTRAVENOUS; SUBCUTANEOUS at 22:44

## 2020-04-07 RX ADMIN — SODIUM POLYSTYRENE SULFONATE 30 GRAM(S): 4.1 POWDER, FOR SUSPENSION ORAL at 01:29

## 2020-04-07 NOTE — PROGRESS NOTE ADULT - ASSESSMENT
68 yo female with a pmh od CKD (stage 5 per son  kidneys at "7-8 %" no plan for Hd" , HTN, and dementia present for elevated BUN/creatine.     Pt has advanced CKD at baseline mentioned above spoke to son, in agreement would be poor HD candidate, want to avoid HD  Agree w/ IVF switch to bicarb drip   ideally place a patterson to monitor UOP though might not be practical   check UA   HyperK noted, hemolyzed   give Kayexalate check blood gas  Bp very elevated s/p labetalol may need nitro drip 68 yo female with a pmh od CKD (stage 5 per son  kidneys at "7-8 %" no plan for Hd" , HTN, and dementia present for elevated BUN/creatine.     # CKD 4-5 creat stable/ would continue IVF / K better today / bicarb better / no need for RRT today   strict I and O   check UA   HyperK noted, better   hold on SPS today   Bp better controlled today   will follow

## 2020-04-07 NOTE — PROGRESS NOTE ADULT - ASSESSMENT
hyperkalemia  renal failure  resp  failure        face mask  IV bicarb drip  prognosis poor  left message with son on voice mail

## 2020-04-07 NOTE — PROGRESS NOTE ADULT - SUBJECTIVE AND OBJECTIVE BOX
Nephrology progress note    THIS IS AN INCOMPLETE NOTE . FULL NOTE TO FOLLOW SHORTLY    Patient is seen and examined, events over the last 24 h noted .    Allergies:  No Known Allergies    Hospital Medications:   MEDICATIONS  (STANDING):  aspirin enteric coated 81 milliGRAM(s) Oral daily  atorvastatin 40 milliGRAM(s) Oral at bedtime  clopidogrel Tablet 75 milliGRAM(s) Oral daily  dextrose 5% 1000 milliLiter(s) (75 mL/Hr) IV Continuous <Continuous>  dextrose 5%. 1000 milliLiter(s) (50 mL/Hr) IV Continuous <Continuous>  dextrose 50% Injectable 12.5 Gram(s) IV Push once  dextrose 50% Injectable 25 Gram(s) IV Push once  dextrose 50% Injectable 25 Gram(s) IV Push once  heparin  Injectable 5000 Unit(s) SubCutaneous every 8 hours  hydrALAZINE 50 milliGRAM(s) Oral every 8 hours  insulin lispro (HumaLOG) corrective regimen sliding scale   SubCutaneous three times a day before meals  metoprolol tartrate 25 milliGRAM(s) Oral two times a day  NIFEdipine XL 30 milliGRAM(s) Oral daily        VITALS:  T(F): 98 (04-07-20 @ 05:27), Max: 98 (04-06-20 @ 15:51)  HR: 92 (04-07-20 @ 05:27)  BP: 157/93 (04-07-20 @ 05:27)  RR: 18 (04-07-20 @ 05:27)  SpO2: 94% (04-07-20 @ 08:35)  Wt(kg): --    04-06 @ 07:01  -  04-07 @ 07:00  --------------------------------------------------------  IN: 650 mL / OUT: 0 mL / NET: 650 mL          PHYSICAL EXAM:  Constitutional: NAD  HEENT: anicteric sclera, oropharynx clear, MMM  Neck: No JVD  Respiratory: CTAB, no wheezes, rales or rhonchi  Cardiovascular: S1, S2, RRR  Gastrointestinal: BS+, soft, NT/ND  Extremities: No cyanosis or clubbing. No peripheral edema  :  No patterson.   Skin: No rashes    LABS:  04-06    140  |  103  |  134<HH>  ----------------------------<  97  7.5<HH>   |  13<L>  |  8.3<HH>    Ca    8.7      06 Apr 2020 15:55    TPro  8.7<H>  /  Alb  3.1<L>  /  TBili  0.3  /  DBili      /  AST  60<H>  /  ALT  25  /  AlkPhos  49  04-06                          15.0   9.46  )-----------( 177      ( 06 Apr 2020 15:55 )             46.6       Urine Studies:      RADIOLOGY & ADDITIONAL STUDIES: Nephrology progress note  Patient is seen and examined, events over the last 24 h noted .  lethargic confused   IV access not working     Allergies:  No Known Allergies    Hospital Medications:   MEDICATIONS  (STANDING):  aspirin enteric coated 81 milliGRAM(s) Oral daily  atorvastatin 40 milliGRAM(s) Oral at bedtime  clopidogrel Tablet 75 milliGRAM(s) Oral daily  dextrose 5% 1000 milliLiter(s) (75 mL/Hr) IV Continuous <Continuous>  dextrose 5%. 1000 milliLiter(s) (50 mL/Hr) IV Continuous <Continuous>  heparin  Injectable 5000 Unit(s) SubCutaneous every 8 hours  hydrALAZINE 50 milliGRAM(s) Oral every 8 hours  insulin lispro (HumaLOG) corrective regimen sliding scale   SubCutaneous three times a day before meals  metoprolol tartrate 25 milliGRAM(s) Oral two times a day  NIFEdipine XL 30 milliGRAM(s) Oral daily        VITALS:  T(F): 98 (04-07-20 @ 05:27), Max: 98 (04-06-20 @ 15:51)  HR: 92 (04-07-20 @ 05:27)  BP: 157/93 (04-07-20 @ 05:27)  RR: 18 (04-07-20 @ 05:27)  SpO2: 94% (04-07-20 @ 08:35)      04-06 @ 07:01  -  04-07 @ 07:00  --------------------------------------------------------  IN: 650 mL / OUT: 0 mL / NET: 650 mL          PHYSICAL EXAM:  Constitutional: NAD  HEENT: anicteric sclera, oropharynx clear, MMM  Neck: No JVD  Respiratory: CTAB, no wheezes, rales or rhonchi  Cardiovascular: S1, S2, RRR  Gastrointestinal: BS+, soft, NT/ND  Extremities: No cyanosis or clubbing. No peripheral edema  :  No patterson.   Skin: No rashes    LABS:  04-07    145  |  103  |  129<HH>  ----------------------------<  124<H>  4.4   |  19  |  8.8<HH>    Ca    8.6      07 Apr 2020 11:28  Phos  6.4     04-07  Mg     2.6     04-07    TPro  7.5  /  Alb  2.8<L>  /  TBili  0.3  /  DBili  x   /  AST  58<H>  /  ALT  30  /  AlkPhos  55  04-07    04-06    140  |  103  |  134<HH>  ----------------------------<  97  7.5<HH>   |  13<L>  |  8.3<HH>    Ca    8.7      06 Apr 2020 15:55    TPro  8.7<H>  /  Alb  3.1<L>  /  TBili  0.3  /  DBili      /  AST  60<H>  /  ALT  25  /  AlkPhos  49  04-06                          15.0   9.46  )-----------( 177      ( 06 Apr 2020 15:55 )             46.6       Urine Studies:      RADIOLOGY & ADDITIONAL STUDIES:

## 2020-04-07 NOTE — PROGRESS NOTE ADULT - SUBJECTIVE AND OBJECTIVE BOX
Hospital Day:  1d    Chief Complaint: Patient is a 69y old Female with a history of T2DM, HTN, CKD unknown baseline), LUCIANO, dementia (minimally verbal at baseline), dyslipidemia, Afib (not on AC), depression   who presents with a chief complaint of Sent from NH for evaluation for non productive cough and abnormal labs, subsequently diagnosed with COVID-19 pneumonia.    24 hour events:  -No acute events overnight  -This AM, increased from NC to 15L non-rebreather to achieve SpO2 of 95%    Subjective:  Unable to obtain ROS, patient non-verbal at baseline    Past Medical Hx:   Dementia  DM (diabetes mellitus), secondary  Dyslipidemia  Anemia  Afib  Depression  HTN (hypertension)  CKD (chronic kidney disease)    Past Sx:  No significant past surgical history    Allergies:  No Known Allergies    Current Meds:   Standing Meds:  aspirin enteric coated 81 milliGRAM(s) Oral daily  atorvastatin 40 milliGRAM(s) Oral at bedtime  clopidogrel Tablet 75 milliGRAM(s) Oral daily  dextrose 5% 1000 milliLiter(s) (75 mL/Hr) IV Continuous <Continuous>  dextrose 5%. 1000 milliLiter(s) (50 mL/Hr) IV Continuous <Continuous>  dextrose 50% Injectable 12.5 Gram(s) IV Push once  dextrose 50% Injectable 25 Gram(s) IV Push once  dextrose 50% Injectable 25 Gram(s) IV Push once  heparin  Injectable 5000 Unit(s) SubCutaneous every 8 hours  hydrALAZINE 50 milliGRAM(s) Oral every 8 hours  insulin lispro (HumaLOG) corrective regimen sliding scale   SubCutaneous three times a day before meals  metoprolol tartrate 25 milliGRAM(s) Oral two times a day  NIFEdipine XL 30 milliGRAM(s) Oral daily    PRN Meds:  acetaminophen   Tablet .. 650 milliGRAM(s) Oral every 6 hours PRN Temp greater or equal to 38C (100.4F), Moderate Pain (4 - 6)  acetaminophen   Tablet .. 650 milliGRAM(s) Oral every 6 hours PRN Temp greater or equal to 38C (100.4F)  dextrose 40% Gel 15 Gram(s) Oral once PRN Blood Glucose LESS THAN 70 milliGRAM(s)/deciliter  glucagon  Injectable 1 milliGRAM(s) IntraMuscular once PRN Glucose LESS THAN 70 milligrams/deciliter    HOME MEDICATIONS:  aspirin 81 mg oral tablet: 1 tab(s) orally once a day  atorvastatin 40 mg oral tablet: 1 tab(s) orally once a day  ferrous sulfate 325 mg (65 mg elemental iron) oral tablet: 1 tab(s) orally once a day  hydrALAZINE 25 mg oral tablet: 3 tab(s) orally 2 times a day  Lasix 20 mg oral tablet: 1 tab(s) orally once a day  Metoprolol Tartrate 25 mg oral tablet: 1 tab(s) orally 2 times a day  NIFEdipine 30 mg oral tablet, extended release: 1 tab(s) orally once a day  Plavix 75 mg oral tablet: 1 tab(s) orally once a day  Tylenol 325 mg oral tablet: 2 tab(s) orally every 6 hours, As Needed  Vitamin D3 2000 intl units (50 mcg) oral tablet: 1 tab(s) orally once a day    Vital Signs:   T(F): 98 (04-07-20 @ 05:27), Max: 98 (04-06-20 @ 15:51)  HR: 92 (04-07-20 @ 05:27) (64 - 107)  BP: 157/93 (04-07-20 @ 05:27) (157/93 - 221/132)  RR: 18 (04-07-20 @ 05:27) (18 - 20)  SpO2: 94% (04-07-20 @ 08:35) (83% - 100%)      04-06-20 @ 07:01  -  04-07-20 @ 07:00  --------------------------------------------------------  IN: 650 mL / OUT: 0 mL / NET: 650 mL    Physical Exam:   GENERAL: agitated, mild respiratory distress  HEENT: NCAT  CHEST/LUNG: CTAB  HEART: Regular rate and rhythm. No murmurs, rubs, or gallops  ABDOMEN: soft, non-tender, non-distended  EXTREMITIES: No LE edema b/l  NERVOUS SYSTEM: non-verbal.     Labs:                         15.0   9.46  )-----------( 177      ( 06 Apr 2020 15:55 )             46.6     Neutophil% 83.6, Lymphocyte% 10.9, Monocyte% 4.1, Bands% 1.3 04-06-20 @ 15:55    06 Apr 2020 15:55    140    |  103    |  134    ----------------------------<  97     7.5     |  13     |  8.3      Ca    8.7        06 Apr 2020 15:55    TPro  8.7    /  Alb  3.1    /  TBili  0.3    /  DBili  x      /  AST  60     /  ALT  25     /  AlkPhos  49     06 Apr 2020 15:55      Radiology:   < from: Xray Chest 1 View-PORTABLE IMMEDIATE (04.06.20 @ 17:51) >  Impression:      There are patchy, bilateral airspace opacities, right side greater than left, compatible with a viral pneumonia, such as COVID-19, in the appropriate clinical setting.     < end of copied text >      Assessment and Plan:   Patient is a 69y old  Female who presents with a chief complaint of Sent from NH for evaluation for non productive cough and abnormal labs, subsequently diagnosed with COVID-19 pneumonia.    #Acute hypoxic respiratory failure 2/2 to COVID-19 pneumonia:  -SpO2: 94% on 15L non-rebreather (04-07 @ 08:35) (83% - 100%) -> will wean to NC as patient not tolerated facemask  -QTc: 469msec  -pending COVID PCR    #WILIAM on CKD with HAGMA:   -As per pt's son, pt has decreased kidney function with recent decreased PO intake due to dementia. Son does not want HD  -Cr on admission 8.3; Baseline ~3.5 per Dr. Grajeda  -BUN/Cr ratio 16 - Intrinsic vs obstructive.   -nephro recs noted: not a good candidate for HD  -c/w D5W w/ 75mEq of HCO3 @ 75mL/hr  -Hold home dose of lasix 20mg   -Place patterson catheter. Monitor strict I/Os    #Hyperkalemia 2/2 to WILIAM  -K 7.5  : HEMOLYZED sample; Repeat on VBG 4.2; No EKG changes  -Will give kayexalate as per nephro. s/p Ca gluconate  -f/u repeat BMP    #Hypertensive urgency  -BP on admission 181/107  -Will restart home dose of procardia and hydralazine.   -Can add labetalol as needed.     #T2DM - Pt not on oral agents. Monitor FS [id number] for now. Insulin sliding scale  #Afib- Unclear why pt not on AC. C/w metoprolol   #Dementia- at baseline  #HLD-c/w statin  #Unclear why pt is on plavix -could not find pt file in ER. NH staff did not know  #LUCIANO- Hb normal   _____________________________________________  #DVT ppx: heparin sq  #GI ppx: not indicated  #Diet: DASH CC soft  #Actvity: IAT  #FULL CODE-d/w son     Family discussion: left two messages for son.  DISPO: remains acute. Pending improvement in oxygen requirements and WILIAM.

## 2020-04-07 NOTE — PROGRESS NOTE ADULT - SUBJECTIVE AND OBJECTIVE BOX
Pts chart reviewed  spoke with resident  dr Banks  will contact son again today  Note to follow Pts chart reviewed  spoke with resident  dr Banks  will contact son again today  left voice mail x 2 on his phone  House staff also left messages  Note to follow  Pt on non breather mask  acidotic on bicarb drip  renal eval noted          MEDICATIONS  (STANDING):  aspirin enteric coated 81 milliGRAM(s) Oral daily  atorvastatin 40 milliGRAM(s) Oral at bedtime  clopidogrel Tablet 75 milliGRAM(s) Oral daily  dextrose 5% 1000 milliLiter(s) (75 mL/Hr) IV Continuous <Continuous>  dextrose 5%. 1000 milliLiter(s) (50 mL/Hr) IV Continuous <Continuous>  dextrose 50% Injectable 12.5 Gram(s) IV Push once  dextrose 50% Injectable 25 Gram(s) IV Push once  dextrose 50% Injectable 25 Gram(s) IV Push once  heparin  Injectable 5000 Unit(s) SubCutaneous every 8 hours  hydrALAZINE 50 milliGRAM(s) Oral every 8 hours  insulin lispro (HumaLOG) corrective regimen sliding scale   SubCutaneous three times a day before meals  metoprolol tartrate 25 milliGRAM(s) Oral two times a day  NIFEdipine XL 30 milliGRAM(s) Oral daily    MEDICATIONS  (PRN):  acetaminophen   Tablet .. 650 milliGRAM(s) Oral every 6 hours PRN Temp greater or equal to 38C (100.4F), Moderate Pain (4 - 6)  acetaminophen   Tablet .. 650 milliGRAM(s) Oral every 6 hours PRN Temp greater or equal to 38C (100.4F)  dextrose 40% Gel 15 Gram(s) Oral once PRN Blood Glucose LESS THAN 70 milliGRAM(s)/deciliter  glucagon  Injectable 1 milliGRAM(s) IntraMuscular once PRN Glucose LESS THAN 70 milligrams/deciliter      Vital Signs Last 24 Hrs  T(C): 36.7 (07 Apr 2020 05:27), Max: 36.7 (06 Apr 2020 15:51)  T(F): 98 (07 Apr 2020 05:27), Max: 98 (06 Apr 2020 15:51)  HR: 92 (07 Apr 2020 05:27) (64 - 107)  BP: 157/93 (07 Apr 2020 05:27) (157/93 - 221/132)  BP(mean): --  RR: 18 (07 Apr 2020 05:27) (18 - 20)  SpO2: 94% (07 Apr 2020 08:35) (83% - 100%)    LABS:                        14.0   8.15  )-----------( 191      ( 07 Apr 2020 11:28 )             41.4     04-06    140  |  103  |  134<HH>  ----------------------------<  97  7.5<HH>   |  13<L>  |  8.3<HH>    Ca    8.7      06 Apr 2020 15:55    TPro  8.7<H>  /  Alb  3.1<L>  /  TBili  0.3  /  DBili  x   /  AST  60<H>  /  ALT  25  /  AlkPhos  49  04-06        Drug Screen Urine:  Alcohol Level        RADIOLOGY & ADDITIONAL STUDIES:

## 2020-04-08 LAB
ALBUMIN SERPL ELPH-MCNC: 2.8 G/DL — LOW (ref 3.5–5.2)
ALP SERPL-CCNC: 63 U/L — SIGNIFICANT CHANGE UP (ref 30–115)
ALT FLD-CCNC: 28 U/L — SIGNIFICANT CHANGE UP (ref 0–41)
ANION GAP SERPL CALC-SCNC: 25 MMOL/L — HIGH (ref 7–14)
APTT BLD: 32.1 SEC — SIGNIFICANT CHANGE UP (ref 27–39.2)
AST SERPL-CCNC: 52 U/L — HIGH (ref 0–41)
BASOPHILS # BLD AUTO: 0.02 K/UL — SIGNIFICANT CHANGE UP (ref 0–0.2)
BASOPHILS NFR BLD AUTO: 0.3 % — SIGNIFICANT CHANGE UP (ref 0–1)
BILIRUB SERPL-MCNC: 0.2 MG/DL — SIGNIFICANT CHANGE UP (ref 0.2–1.2)
BUN SERPL-MCNC: 128 MG/DL — CRITICAL HIGH (ref 10–20)
CALCIUM SERPL-MCNC: 8.8 MG/DL — SIGNIFICANT CHANGE UP (ref 8.5–10.1)
CHLORIDE SERPL-SCNC: 98 MMOL/L — SIGNIFICANT CHANGE UP (ref 98–110)
CO2 SERPL-SCNC: 23 MMOL/L — SIGNIFICANT CHANGE UP (ref 17–32)
CREAT SERPL-MCNC: 9.3 MG/DL — CRITICAL HIGH (ref 0.7–1.5)
CRP SERPL-MCNC: 16.37 MG/DL — HIGH (ref 0–0.4)
D DIMER BLD IA.RAPID-MCNC: 2415 NG/ML DDU — HIGH (ref 0–230)
EOSINOPHIL # BLD AUTO: 0.01 K/UL — SIGNIFICANT CHANGE UP (ref 0–0.7)
EOSINOPHIL NFR BLD AUTO: 0.2 % — SIGNIFICANT CHANGE UP (ref 0–8)
GLUCOSE BLDC GLUCOMTR-MCNC: 101 MG/DL — HIGH (ref 70–99)
GLUCOSE BLDC GLUCOMTR-MCNC: 124 MG/DL — HIGH (ref 70–99)
GLUCOSE BLDC GLUCOMTR-MCNC: 156 MG/DL — HIGH (ref 70–99)
GLUCOSE BLDC GLUCOMTR-MCNC: 86 MG/DL — SIGNIFICANT CHANGE UP (ref 70–99)
GLUCOSE SERPL-MCNC: 128 MG/DL — HIGH (ref 70–99)
HCT VFR BLD CALC: 41.8 % — SIGNIFICANT CHANGE UP (ref 37–47)
HGB BLD-MCNC: 14.7 G/DL — SIGNIFICANT CHANGE UP (ref 12–16)
IMM GRANULOCYTES NFR BLD AUTO: 1.5 % — HIGH (ref 0.1–0.3)
INR BLD: 0.88 RATIO — SIGNIFICANT CHANGE UP (ref 0.65–1.3)
LYMPHOCYTES # BLD AUTO: 0.55 K/UL — LOW (ref 1.2–3.4)
LYMPHOCYTES # BLD AUTO: 8.3 % — LOW (ref 20.5–51.1)
MAGNESIUM SERPL-MCNC: 2.4 MG/DL — SIGNIFICANT CHANGE UP (ref 1.8–2.4)
MCHC RBC-ENTMCNC: 33.3 PG — HIGH (ref 27–31)
MCHC RBC-ENTMCNC: 35.2 G/DL — SIGNIFICANT CHANGE UP (ref 32–37)
MCV RBC AUTO: 94.8 FL — SIGNIFICANT CHANGE UP (ref 81–99)
MONOCYTES # BLD AUTO: 0.21 K/UL — SIGNIFICANT CHANGE UP (ref 0.1–0.6)
MONOCYTES NFR BLD AUTO: 3.2 % — SIGNIFICANT CHANGE UP (ref 1.7–9.3)
NEUTROPHILS # BLD AUTO: 5.72 K/UL — SIGNIFICANT CHANGE UP (ref 1.4–6.5)
NEUTROPHILS NFR BLD AUTO: 86.5 % — HIGH (ref 42.2–75.2)
NRBC # BLD: 0 /100 WBCS — SIGNIFICANT CHANGE UP (ref 0–0)
PLATELET # BLD AUTO: 189 K/UL — SIGNIFICANT CHANGE UP (ref 130–400)
POTASSIUM SERPL-MCNC: 4.4 MMOL/L — SIGNIFICANT CHANGE UP (ref 3.5–5)
POTASSIUM SERPL-SCNC: 4.4 MMOL/L — SIGNIFICANT CHANGE UP (ref 3.5–5)
PROCALCITONIN SERPL-MCNC: 1.68 NG/ML — HIGH (ref 0.02–0.1)
PROT SERPL-MCNC: 7.5 G/DL — SIGNIFICANT CHANGE UP (ref 6–8)
PROTHROM AB SERPL-ACNC: 10.1 SEC — SIGNIFICANT CHANGE UP (ref 9.95–12.87)
RBC # BLD: 4.41 M/UL — SIGNIFICANT CHANGE UP (ref 4.2–5.4)
RBC # FLD: 13.4 % — SIGNIFICANT CHANGE UP (ref 11.5–14.5)
SODIUM SERPL-SCNC: 146 MMOL/L — SIGNIFICANT CHANGE UP (ref 135–146)
WBC # BLD: 6.61 K/UL — SIGNIFICANT CHANGE UP (ref 4.8–10.8)
WBC # FLD AUTO: 6.61 K/UL — SIGNIFICANT CHANGE UP (ref 4.8–10.8)

## 2020-04-08 PROCEDURE — 99221 1ST HOSP IP/OBS SF/LOW 40: CPT

## 2020-04-08 PROCEDURE — 71045 X-RAY EXAM CHEST 1 VIEW: CPT | Mod: 26

## 2020-04-08 PROCEDURE — 99497 ADVNCD CARE PLAN 30 MIN: CPT | Mod: 25

## 2020-04-08 RX ORDER — HYDROMORPHONE HYDROCHLORIDE 2 MG/ML
0.5 INJECTION INTRAMUSCULAR; INTRAVENOUS; SUBCUTANEOUS EVERY 6 HOURS
Refills: 0 | Status: DISCONTINUED | OUTPATIENT
Start: 2020-04-08 | End: 2020-04-10

## 2020-04-08 RX ORDER — HYDROMORPHONE HYDROCHLORIDE 2 MG/ML
0.5 INJECTION INTRAMUSCULAR; INTRAVENOUS; SUBCUTANEOUS ONCE
Refills: 0 | Status: DISCONTINUED | OUTPATIENT
Start: 2020-04-08 | End: 2020-04-08

## 2020-04-08 RX ORDER — HYDROMORPHONE HYDROCHLORIDE 2 MG/ML
0.5 INJECTION INTRAMUSCULAR; INTRAVENOUS; SUBCUTANEOUS
Refills: 0 | Status: DISCONTINUED | OUTPATIENT
Start: 2020-04-08 | End: 2020-04-08

## 2020-04-08 RX ORDER — SODIUM CHLORIDE 9 MG/ML
1000 INJECTION, SOLUTION INTRAVENOUS
Refills: 0 | Status: DISCONTINUED | OUTPATIENT
Start: 2020-04-08 | End: 2020-04-10

## 2020-04-08 RX ORDER — MORPHINE SULFATE 50 MG/1
1 CAPSULE, EXTENDED RELEASE ORAL ONCE
Refills: 0 | Status: DISCONTINUED | OUTPATIENT
Start: 2020-04-08 | End: 2020-04-08

## 2020-04-08 RX ADMIN — HYDROMORPHONE HYDROCHLORIDE 0.5 MILLIGRAM(S): 2 INJECTION INTRAMUSCULAR; INTRAVENOUS; SUBCUTANEOUS at 12:35

## 2020-04-08 RX ADMIN — Medication 30 MILLIGRAM(S): at 05:35

## 2020-04-08 RX ADMIN — Medication 400 MILLIGRAM(S): at 01:22

## 2020-04-08 RX ADMIN — CLOPIDOGREL BISULFATE 75 MILLIGRAM(S): 75 TABLET, FILM COATED ORAL at 12:35

## 2020-04-08 RX ADMIN — Medication 50 MILLIGRAM(S): at 05:35

## 2020-04-08 RX ADMIN — Medication 81 MILLIGRAM(S): at 12:35

## 2020-04-08 RX ADMIN — Medication 0.5 MILLIGRAM(S): at 14:44

## 2020-04-08 RX ADMIN — HEPARIN SODIUM 5000 UNIT(S): 5000 INJECTION INTRAVENOUS; SUBCUTANEOUS at 05:36

## 2020-04-08 RX ADMIN — HEPARIN SODIUM 5000 UNIT(S): 5000 INJECTION INTRAVENOUS; SUBCUTANEOUS at 23:54

## 2020-04-08 RX ADMIN — SODIUM CHLORIDE 75 MILLILITER(S): 9 INJECTION, SOLUTION INTRAVENOUS at 12:38

## 2020-04-08 RX ADMIN — MORPHINE SULFATE 1 MILLIGRAM(S): 50 CAPSULE, EXTENDED RELEASE ORAL at 02:46

## 2020-04-08 RX ADMIN — HEPARIN SODIUM 5000 UNIT(S): 5000 INJECTION INTRAVENOUS; SUBCUTANEOUS at 14:36

## 2020-04-08 RX ADMIN — Medication 50 MILLIGRAM(S): at 14:35

## 2020-04-08 RX ADMIN — Medication 200 MILLIGRAM(S): at 14:36

## 2020-04-08 RX ADMIN — Medication 25 MILLIGRAM(S): at 05:35

## 2020-04-08 NOTE — PROGRESS NOTE ADULT - SUBJECTIVE AND OBJECTIVE BOX
Hospital Day:  2d    Chief Complaint: Patient is a 69y old Female with a history of T2DM, HTN, CKD unknown baseline), LUCIANO, dementia (minimally verbal at baseline), dyslipidemia, Afib (not on AC), depression who presents with a chief complaint of Sent from NH for evaluation for non productive cough and abnormal labs, subsequently diagnosed with COVID-19 pneumonia.    24 hour events:  -No acute events overnight  -Patient desturating to 85% on 15L NRB. Case discussed with patient's son, patient made DNR/DNI    Subjective:  Unable to obtain ROS    Past Medical Hx:   Dementia  DM (diabetes mellitus), secondary  Dyslipidemia  Anemia  Afib  Depression  HTN (hypertension)  CKD (chronic kidney disease)    Past Sx:  No significant past surgical history    Allergies:  No Known Allergies    Current Meds:   Standing Meds:  aspirin enteric coated 81 milliGRAM(s) Oral daily  atorvastatin 40 milliGRAM(s) Oral at bedtime  clopidogrel Tablet 75 milliGRAM(s) Oral daily  dextrose 5%. 1000 milliLiter(s) (50 mL/Hr) IV Continuous <Continuous>  dextrose 50% Injectable 12.5 Gram(s) IV Push once  dextrose 50% Injectable 25 Gram(s) IV Push once  dextrose 50% Injectable 25 Gram(s) IV Push once  heparin  Injectable 5000 Unit(s) SubCutaneous every 8 hours  hydrALAZINE 50 milliGRAM(s) Oral every 8 hours  HYDROmorphone  Injectable 0.5 milliGRAM(s) IV Push once  hydroxychloroquine   Oral   hydroxychloroquine 200 milliGRAM(s) Oral every 12 hours  insulin lispro (HumaLOG) corrective regimen sliding scale   SubCutaneous three times a day before meals  metoprolol tartrate 25 milliGRAM(s) Oral two times a day  NIFEdipine XL 30 milliGRAM(s) Oral daily  sodium chloride 0.45%. 1000 milliLiter(s) (75 mL/Hr) IV Continuous <Continuous>    PRN Meds:  acetaminophen   Tablet .. 650 milliGRAM(s) Oral every 6 hours PRN Temp greater or equal to 38C (100.4F)  acetaminophen   Tablet .. 650 milliGRAM(s) Oral every 6 hours PRN Temp greater or equal to 38C (100.4F), Moderate Pain (4 - 6)  dextrose 40% Gel 15 Gram(s) Oral once PRN Blood Glucose LESS THAN 70 milliGRAM(s)/deciliter  glucagon  Injectable 1 milliGRAM(s) IntraMuscular once PRN Glucose LESS THAN 70 milligrams/deciliter    HOME MEDICATIONS:  aspirin 81 mg oral tablet: 1 tab(s) orally once a day  atorvastatin 40 mg oral tablet: 1 tab(s) orally once a day  ferrous sulfate 325 mg (65 mg elemental iron) oral tablet: 1 tab(s) orally once a day  hydrALAZINE 25 mg oral tablet: 3 tab(s) orally 2 times a day  Lasix 20 mg oral tablet: 1 tab(s) orally once a day  Metoprolol Tartrate 25 mg oral tablet: 1 tab(s) orally 2 times a day  NIFEdipine 30 mg oral tablet, extended release: 1 tab(s) orally once a day  Plavix 75 mg oral tablet: 1 tab(s) orally once a day  Tylenol 325 mg oral tablet: 2 tab(s) orally every 6 hours, As Needed  Vitamin D3 2000 intl units (50 mcg) oral tablet: 1 tab(s) orally once a day    Vital Signs:   T(F): 96.6 (04-08-20 @ 06:00), Max: 97.1 (04-07-20 @ 19:34)  HR: 78 (04-08-20 @ 06:00) (78 - 95)  BP: 151/79 (04-08-20 @ 06:00) (136/83 - 167/96)  RR: 18 (04-08-20 @ 06:00) (16 - 20)  SpO2: 94% (04-08-20 @ 05:49) (86% - 94%)      04-07-20 @ 07:01  -  04-08-20 @ 07:00  --------------------------------------------------------  IN: 825 mL / OUT: 0 mL / NET: 825 mL    Physical Exam:   GENERAL: agitated, tachypneic  HEENT: NCAT  CHEST/LUNG: CTAB  HEART: Regular rate and rhythm. No murmurs, rubs, or gallops  ABDOMEN: soft, non-tender, non-distended  EXTREMITIES: No LE edema b/l  NERVOUS SYSTEM: Alert and oriented x 0    Labs:                         14.7   6.61  )-----------( 189      ( 08 Apr 2020 05:55 )             41.8     Neutophil% 86.5, Lymphocyte% 8.3, Monocyte% 3.2, Bands% 1.5 04-08-20 @ 05:55    08 Apr 2020 05:55    146    |  98     |  128    ----------------------------<  128    4.4     |  23     |  9.3      Ca    8.8        08 Apr 2020 05:55  Phos  6.4       07 Apr 2020 11:28  Mg     2.4       08 Apr 2020 05:55    TPro  7.5    /  Alb  2.8    /  TBili  0.2    /  DBili  x      /  AST  52     /  ALT  28     /  AlkPhos  63     08 Apr 2020 05:55       pTT    32.1             ----< 0.88 INR  (04-08-20 @ 10:00)    10.10        PT          Troponin --, CKMB 13.1,  04-07-20 @ 11:28      COVID-19 PCR: Detected (04-06)    D-Dimer Assay, Quantitative: 2415 ng/mL DDU [0 - 230] (04-08)    Lactate Dehydrogenase, Serum: 703 [50 - 242] (04-07)    Radiology:       Assessment and Plan:   Patient is a 69y old Female with a history of T2DM, HTN, CKD unknown baseline), LUCIANO, dementia (minimally verbal at baseline), dyslipidemia, Afib (not on AC), depression who presents with a chief complaint of Sent from NH for evaluation for non productive cough and abnormal labs, subsequently diagnosed with COVID-19 pneumonia.    #Acute hypoxic respiratory failure 2/2 to COVID-19 pneumonia:  -COVID-19 PCR: Detected (04-06)  -SpO2: 85% on 15L NRB  -QTc:   -continue with plaquenil (4/7-4/12)  -Procalcitonin: pending   -CRP: pending   -D-dimer: 2415 (04-08)  -LDH: 703 (04-07)  -Ferritin: -Troponin:     #WILIAM on CKD with HAGMA:   -As per pt's son, pt has decreased kidney function with recent decreased PO intake due to dementia. Son does not want HD  -Cr on admission 8.3, worsening to 9.3 today; Baseline ~3.5 per Dr. Grajeda  -nephro recs noted: will discuss with son about possible HD  -switch to 1/2NS @ 75mL/hr  -Hold home dose of lasix 20mg   -Place patterson catheter. Monitor strict I/Os    #Hyperkalemia 2/2 to WILIAM: resolved    #Hypertensive urgency: well-controlled now  -BP on admission 181/107  -Will restart home dose of procardia and hydralazine.   -Can add labetalol as needed.     #T2DM - Pt not on oral agents. Monitor FS [id number] for now. Insulin sliding scale  #Afib- Unclear why pt not on AC. C/w metoprolol   #Dementia- at baseline  #HLD-c/w statin  #Unclear why pt is on plavix -could not find pt file in ER. NH staff did not know  #LUCIANO- Hb normal   _____________________________________________  #DVT ppx: heparin sq  #GI ppx: not indicated  #Diet: DASH CC soft  #Actvity: IAT  #DNR/DNI - MOLST completed 4/8    Family discussion: discussed extensively with son on 4/8   DISPO: remains acute. Pending improvement in oxygen requirements and WILIAM. Hospital Day:  2d    Chief Complaint: Patient is a 69y old Female with a history of T2DM, HTN, CKD unknown baseline), LUCIANO, dementia (minimally verbal at baseline), dyslipidemia, Afib (not on AC), depression who presents with a chief complaint of Sent from NH for evaluation for non productive cough and abnormal labs, subsequently diagnosed with COVID-19 pneumonia.    24 hour events:  -No acute events overnight  -Patient desturating to 85% on 15L NRB. Case discussed with patient's son, patient made DNR/DNI    Subjective:  Unable to obtain ROS    Past Medical Hx:   Dementia  DM (diabetes mellitus), secondary  Dyslipidemia  Anemia  Afib  Depression  HTN (hypertension)  CKD (chronic kidney disease)    Past Sx:  No significant past surgical history    Allergies:  No Known Allergies    Current Meds:   Standing Meds:  aspirin enteric coated 81 milliGRAM(s) Oral daily  atorvastatin 40 milliGRAM(s) Oral at bedtime  clopidogrel Tablet 75 milliGRAM(s) Oral daily  dextrose 5%. 1000 milliLiter(s) (50 mL/Hr) IV Continuous <Continuous>  dextrose 50% Injectable 12.5 Gram(s) IV Push once  dextrose 50% Injectable 25 Gram(s) IV Push once  dextrose 50% Injectable 25 Gram(s) IV Push once  heparin  Injectable 5000 Unit(s) SubCutaneous every 8 hours  hydrALAZINE 50 milliGRAM(s) Oral every 8 hours  HYDROmorphone  Injectable 0.5 milliGRAM(s) IV Push once  hydroxychloroquine   Oral   hydroxychloroquine 200 milliGRAM(s) Oral every 12 hours  insulin lispro (HumaLOG) corrective regimen sliding scale   SubCutaneous three times a day before meals  metoprolol tartrate 25 milliGRAM(s) Oral two times a day  NIFEdipine XL 30 milliGRAM(s) Oral daily  sodium chloride 0.45%. 1000 milliLiter(s) (75 mL/Hr) IV Continuous <Continuous>    PRN Meds:  acetaminophen   Tablet .. 650 milliGRAM(s) Oral every 6 hours PRN Temp greater or equal to 38C (100.4F)  acetaminophen   Tablet .. 650 milliGRAM(s) Oral every 6 hours PRN Temp greater or equal to 38C (100.4F), Moderate Pain (4 - 6)  dextrose 40% Gel 15 Gram(s) Oral once PRN Blood Glucose LESS THAN 70 milliGRAM(s)/deciliter  glucagon  Injectable 1 milliGRAM(s) IntraMuscular once PRN Glucose LESS THAN 70 milligrams/deciliter    HOME MEDICATIONS:  aspirin 81 mg oral tablet: 1 tab(s) orally once a day  atorvastatin 40 mg oral tablet: 1 tab(s) orally once a day  ferrous sulfate 325 mg (65 mg elemental iron) oral tablet: 1 tab(s) orally once a day  hydrALAZINE 25 mg oral tablet: 3 tab(s) orally 2 times a day  Lasix 20 mg oral tablet: 1 tab(s) orally once a day  Metoprolol Tartrate 25 mg oral tablet: 1 tab(s) orally 2 times a day  NIFEdipine 30 mg oral tablet, extended release: 1 tab(s) orally once a day  Plavix 75 mg oral tablet: 1 tab(s) orally once a day  Tylenol 325 mg oral tablet: 2 tab(s) orally every 6 hours, As Needed  Vitamin D3 2000 intl units (50 mcg) oral tablet: 1 tab(s) orally once a day    Vital Signs:   T(F): 96.6 (04-08-20 @ 06:00), Max: 97.1 (04-07-20 @ 19:34)  HR: 78 (04-08-20 @ 06:00) (78 - 95)  BP: 151/79 (04-08-20 @ 06:00) (136/83 - 167/96)  RR: 18 (04-08-20 @ 06:00) (16 - 20)  SpO2: 94% (04-08-20 @ 05:49) (86% - 94%)      04-07-20 @ 07:01  -  04-08-20 @ 07:00  --------------------------------------------------------  IN: 825 mL / OUT: 0 mL / NET: 825 mL    Physical Exam:   GENERAL: agitated, tachypneic  HEENT: NCAT  CHEST/LUNG: CTAB  HEART: Regular rate and rhythm. No murmurs, rubs, or gallops  ABDOMEN: soft, non-tender, non-distended  EXTREMITIES: No LE edema b/l  NERVOUS SYSTEM: Alert and oriented x 0    Labs:                         14.7   6.61  )-----------( 189      ( 08 Apr 2020 05:55 )             41.8     Neutophil% 86.5, Lymphocyte% 8.3, Monocyte% 3.2, Bands% 1.5 04-08-20 @ 05:55    08 Apr 2020 05:55    146    |  98     |  128    ----------------------------<  128    4.4     |  23     |  9.3      Ca    8.8        08 Apr 2020 05:55  Phos  6.4       07 Apr 2020 11:28  Mg     2.4       08 Apr 2020 05:55    TPro  7.5    /  Alb  2.8    /  TBili  0.2    /  DBili  x      /  AST  52     /  ALT  28     /  AlkPhos  63     08 Apr 2020 05:55       pTT    32.1             ----< 0.88 INR  (04-08-20 @ 10:00)    10.10        PT          Troponin --, CKMB 13.1,  04-07-20 @ 11:28      COVID-19 PCR: Detected (04-06)    D-Dimer Assay, Quantitative: 2415 ng/mL DDU [0 - 230] (04-08)    Lactate Dehydrogenase, Serum: 703 [50 - 242] (04-07)    Radiology:       Assessment and Plan:   Patient is a 69y old Female with a history of T2DM, HTN, CKD unknown baseline), LUCIANO, dementia (minimally verbal at baseline), dyslipidemia, Afib (not on AC), depression who presents with a chief complaint of Sent from NH for evaluation for non productive cough and abnormal labs, subsequently diagnosed with COVID-19 pneumonia.    #Acute hypoxic respiratory failure 2/2 to COVID-19 pneumonia:  -COVID-19 PCR: Detected (04-06)  -SpO2: 85% on 15L NRB  -PATIENT DNR/DNI  -will start dilaudid 0.5mg q6 hours prn for resp distress  -palliative care assistance appreciated  -continue with plaquenil (4/7-4/12)  -Procalcitonin: pending   -CRP: pending   -D-dimer: 2415 (04-08)  -LDH: 703 (04-07)  -Ferritin: -Troponin:     #WILIAM on CKD with HAGMA:   -As per pt's son, pt has decreased kidney function with recent decreased PO intake due to dementia. Son does not want HD  -Cr on admission 8.3, worsening to 9.3 today; Baseline ~3.5 per Dr. Grajeda  -nephemely gonzalez noted: will discuss with son about possible HD  -switch to 1/2NS @ 75mL/hr  -Hold home dose of lasix 20mg   -Place patterson catheter. Monitor strict I/Os    #Hyperkalemia 2/2 to WILIAM: resolved    #Hypertensive urgency: well-controlled now  -BP on admission 181/107  -Will restart home dose of procardia and hydralazine.   -Can add labetalol as needed.     #T2DM - Pt not on oral agents. Monitor FS [id number] for now. Insulin sliding scale  #Afib- Unclear why pt not on AC. C/w metoprolol   #Dementia- at baseline  #HLD-c/w statin  #Unclear why pt is on plavix -could not find pt file in ER. NH staff did not know  #LUCIANO- Hb normal   _____________________________________________  #DVT ppx: heparin sq  #GI ppx: not indicated  #Diet: DASH CC soft  #Actvity: IAT  #DNR/DNI - MOLST completed 4/8    Family discussion: discussed extensively with son on 4/8   DISPO: remains acute. Pending improvement in oxygen requirements and WILIAM.

## 2020-04-08 NOTE — PROGRESS NOTE ADULT - SUBJECTIVE AND OBJECTIVE BOX
Nephrology progress note    THIS IS AN INCOMPLETE NOTE . FULL NOTE TO FOLLOW SHORTLY    Patient is seen and examined, events over the last 24 h noted .    Allergies:  No Known Allergies    Hospital Medications:   MEDICATIONS  (STANDING):  aspirin enteric coated 81 milliGRAM(s) Oral daily  atorvastatin 40 milliGRAM(s) Oral at bedtime  clopidogrel Tablet 75 milliGRAM(s) Oral daily  dextrose 5% 1000 milliLiter(s) (75 mL/Hr) IV Continuous <Continuous>  dextrose 5%. 1000 milliLiter(s) (50 mL/Hr) IV Continuous <Continuous>  dextrose 50% Injectable 12.5 Gram(s) IV Push once  dextrose 50% Injectable 25 Gram(s) IV Push once  dextrose 50% Injectable 25 Gram(s) IV Push once  heparin  Injectable 5000 Unit(s) SubCutaneous every 8 hours  hydrALAZINE 50 milliGRAM(s) Oral every 8 hours  hydroxychloroquine   Oral   hydroxychloroquine 200 milliGRAM(s) Oral every 12 hours  insulin lispro (HumaLOG) corrective regimen sliding scale   SubCutaneous three times a day before meals  metoprolol tartrate 25 milliGRAM(s) Oral two times a day  NIFEdipine XL 30 milliGRAM(s) Oral daily        VITALS:  T(F): 96.6 (04-08-20 @ 06:00), Max: 97.1 (04-07-20 @ 19:34)  HR: 78 (04-08-20 @ 06:00)  BP: 151/79 (04-08-20 @ 06:00)  RR: 18 (04-08-20 @ 06:00)  SpO2: 94% (04-08-20 @ 05:49)  Wt(kg): --    04-06 @ 07:01  -  04-07 @ 07:00  --------------------------------------------------------  IN: 650 mL / OUT: 0 mL / NET: 650 mL    04-07 @ 07:01  -  04-08 @ 07:00  --------------------------------------------------------  IN: 825 mL / OUT: 0 mL / NET: 825 mL          PHYSICAL EXAM:  Constitutional: NAD  HEENT: anicteric sclera, oropharynx clear, MMM  Neck: No JVD  Respiratory: CTAB, no wheezes, rales or rhonchi  Cardiovascular: S1, S2, RRR  Gastrointestinal: BS+, soft, NT/ND  Extremities: No cyanosis or clubbing. No peripheral edema  :  No patterson.   Skin: No rashes    LABS:  04-08    146  |  98  |  128<HH>  ----------------------------<  128<H>  4.4   |  23  |  9.3<HH>    Ca    8.8      08 Apr 2020 05:55  Phos  6.4     04-07  Mg     2.4     04-08    TPro  7.5  /  Alb  2.8<L>  /  TBili  0.2  /  DBili      /  AST  52<H>  /  ALT  28  /  AlkPhos  63  04-08                          14.7   6.61  )-----------( 189      ( 08 Apr 2020 05:55 )             41.8       Urine Studies:      RADIOLOGY & ADDITIONAL STUDIES: Nephrology progress note  Patient is seen and examined, events over the last 24 h noted .  lethargic coughing   on IVF   wetting bed   No leonardo     Allergies:  No Known Allergies    Hospital Medications:   MEDICATIONS  (STANDING):    aspirin enteric coated 81 milliGRAM(s) Oral daily  atorvastatin 40 milliGRAM(s) Oral at bedtime  clopidogrel Tablet 75 milliGRAM(s) Oral daily  dextrose 5% 1000 milliLiter(s) (75 mL/Hr) IV Continuous <Continuous>  dextrose 5%. 1000 milliLiter(s) (50 mL/Hr) IV Continuous <Continuous>  heparin  Injectable 5000 Unit(s) SubCutaneous every 8 hours  hydrALAZINE 50 milliGRAM(s) Oral every 8 hours  hydroxychloroquine 200 milliGRAM(s) Oral every 12 hours  insulin lispro (HumaLOG) corrective regimen sliding scale   SubCutaneous three times a day before meals  metoprolol tartrate 25 milliGRAM(s) Oral two times a day  NIFEdipine XL 30 milliGRAM(s) Oral daily        VITALS:  T(F): 96.6 (04-08-20 @ 06:00), Max: 97.1 (04-07-20 @ 19:34)  HR: 78 (04-08-20 @ 06:00)  BP: 151/79 (04-08-20 @ 06:00)  RR: 18 (04-08-20 @ 06:00)  SpO2: 94% (04-08-20 @ 05:49)      04-06 @ 07:01  -  04-07 @ 07:00  --------------------------------------------------------  IN: 650 mL / OUT: 0 mL / NET: 650 mL    04-07 @ 07:01  -  04-08 @ 07:00  --------------------------------------------------------  IN: 825 mL / OUT: 0 mL / NET: 825 mL          PHYSICAL EXAM:  Constitutional: NAD  HEENT: anicteric sclera, oropharynx clear, MMM  Neck: No JVD  Respiratory: CTAB, no wheezes, rales or rhonchi  Cardiovascular: S1, S2, RRR  Gastrointestinal: BS+, soft, NT/ND  Extremities: No cyanosis or clubbing. No peripheral edema  :  No patterson.   Skin: No rashes    LABS:  04-08    146  |  98  |  128<HH>  ----------------------------<  128<H>  4.4   |  23  |  9.3<HH>  Creatinine Trend: 9.3<--, 8.8<--, 8.3<--  Potassium Trend: 4.4<--, 4.4<--, 7.5<--  Ca    8.8      08 Apr 2020 05:55  Phos  6.4     04-07  Mg     2.4     04-08    TPro  7.5  /  Alb  2.8<L>  /  TBili  0.2  /  DBili      /  AST  52<H>  /  ALT  28  /  AlkPhos  63  04-08                          14.7   6.61  )-----------( 189      ( 08 Apr 2020 05:55 )             41.8       Urine Studies:      RADIOLOGY & ADDITIONAL STUDIES:  < from: Xray Chest 1 View-PORTABLE IMMEDIATE (04.08.20 @ 09:39) >  Impression:      Stable right greater than left airspace opacifications.    < end of copied text >

## 2020-04-08 NOTE — CONSULT NOTE ADULT - ASSESSMENT
Consult Summary: Pt is a 69 year old with advanced dementia from assisted living w COVID symptoms (+) test. Pt has ESRD stage 4 GFR 4. Pt on NRB mask sats 80-90s. Pt's son Steven given updates about her medical condition. Made mom DNR/DNI. Called palliative for symptom management and discussion of comfort care as pt is in distress. grave prognosis     Spoke to son Chuy he is next of kin. Introduced to palliative care. He verbalized understanding, and he is appreciative of palliative care interventions for comfort. He is emotionally overwhelmed and afraid to visit even if we can get him a pass. He would like to facetime. He knows her prognosis is very poor. He does not want feeding tubes, and after 24 hrs if mom declining he will consider comfort measures completely but he is too overwhelmed to decide that right now. Pt crying, appropriately       Morphine Equivalent Daily Dose (MEDD): 7.5mg /24hrs     Recommendations:  DNR/DNI  no feeding tubes  ongoing medical management  if any significant decline call son and discuss comfort care, palliative team will follow  pastoral care follow up  Ativan 0.5mg Q 4 HRS PRN for anxiety/restlessness   Dilaudid 0.5mg Q 3 HRS PRN For dyspnea/respiratory distress          Please Call [phone] PRN

## 2020-04-08 NOTE — GOALS OF CARE CONVERSATION - ADVANCED CARE PLANNING - CONVERSATION/DISCUSSION
Prognosis/Palliative Care Referral/MOLST Discussed/Treatment Options
Diagnosis/Treatment Options/Prognosis/MOLST Discussed

## 2020-04-08 NOTE — CHART NOTE - NSCHARTNOTEFT_GEN_A_CORE
Palliative care LMSW reached out to the patient's son Duncan to provide support.  Patient's son verbalized understanding of the patient's overall medical conditions and very poor prognosis. Patient's son is appropriately sad; relating he has been taking care of her for some time. Patient's son requesting a facetime call. LMSW contacted unit , who related she will forward the information to the unit RN.    Supportive counseling provided in length. Palliative care will continue to closely monitor.

## 2020-04-08 NOTE — PROGRESS NOTE ADULT - ASSESSMENT
70 yo female with a pmh od CKD (stage 5 per son  kidneys at "7-8 %" no plan for Hd" , HTN, and dementia present for elevated BUN/creatine.     # CKD 4-5 creat stable/ would continue IVF / K better today / bicarb better / no need for RRT/ creat worse   CAse discussed with son Lauro Johnson over the phone and advised against starting RRT giving mother comorbidities and PMH and to continue medical management / Son agreeable to plan / will not do HD   strict I and O   check UA   HyperK noted, better   Bp better controlled today   will follow

## 2020-04-08 NOTE — GOALS OF CARE CONVERSATION - ADVANCED CARE PLANNING - CONVERSATION DETAILS
Discussed with son that patient has increasing oxygen requirements from COVID-19 pneumonia. Informed son that the next step would be intubation, but given patient's underlying dementia and medical comorbidities, intubation may not be the best option. Discussed with son that patient may not be able to be weaned off the ventilator, and that her mental status may further decline post-extubation. Son expressed understanding and request patient be made DNR/DNI. He wishes to continue other management. Palliative care consult placed Discussed with son that patient has increasing oxygen requirements from COVID-19 pneumonia. Informed son that the next step would be intubation, but given patient's underlying dementia and medical comorbidities, intubation may not be the best option. Discussed with son that patient may not be able to be weaned off the ventilator, and that her mental status may further decline post-extubation. Son expressed understanding and request patient be made DNR/DNI. He wishes to continue other management. Palliative care consult placed      Med attending:  agree with above discussion  and care plan

## 2020-04-08 NOTE — CONSULT NOTE ADULT - SUBJECTIVE AND OBJECTIVE BOX
REQUESTED OF: DR Flowers    Chart reviewed, Hospital Day 2    MITCHELL BOWEN 69yFemale  HPI:  68 yo female  PMHx: T2DM, HTN, CKD unknown baseline), LUCIANO, dementia, dyslipidemia, Afib (not on AC), depression  CC: Abnormal labs; Non productive cough  History obtained from NH personnel at [place] [facility] and pt's son Caleb dash. Pt at baseline is mostly non verbal, sometimes does speak a few words in Swedish and can for most part understand as per the NH staff. Pt has baseline dementia.   As per the NH staff, pt was sent to the hospital for abnormal labs including high creatinine and for evaluation of non productive cough for the past 3 days. NH staff denied any fever, shortness of breath, abdominal pain, nausea/vomiting. Pt's son said that pt has been having very poor PO intake and has been spitting out food and not eating at all. As per the son, pt has low kidney function but has been enough till now to avoid dialysis. The son said that they would like to avoid dialysis as much as possible until it is imminent.   ER Course: Vitals on admission were /107, HR 98, T 97.4F, SpO2 99% on 2L NC O2. Labs were significant for Cr of 8.3 with  and eGFR of 4. K was 7.5 which was a hemolyzed sample and was 4.2 on repeat VBG. EKG showed sinus tachycardia with QTC of 469. (06 Apr 2020 20:59)        PAST MEDICAL & SURGICAL HISTORY:  Dementia  DM (diabetes mellitus), secondary  Dyslipidemia  Anemia  Afib  Depression  HTN (hypertension)  CKD (chronic kidney disease)  No significant past surgical history      Subjective and Objective:  Today,  Discussed with Dr Banks medical resident    Focused Palliative Care Evaluation:                   Symptoms:                                      Pain                                     Dyspnea YES                                     N/V                                     Appetite                                     Anxiety YES                                     Other _____________________                     Support Devices:              PHYSICAL EXAM:      Constitutional:    Eyes:    ENMT:    Neck:    Breasts:    Back:    Respiratory:    Cardiovascular:    Gastrointestinal:    Genitourinary:    Rectal:    Extremities:    Vascular:    Neurological:    Skin:    Lymph Nodes:    Musculoskeletal:    Psychiatric:        T(C): 35.6, Max: 36.2 (19:34)  HR: 85 (78 - 95)  BP: 130/77 (130/77 - 158/91)  RR: 18 (16 - 20)  SpO2: 84% (68% - 94%)      LABS/STUDIES:  04-08    146  |  98  |  128<HH>  ----------------------------<  128<H>  4.4   |  23  |  9.3<HH>    Ca    8.8      08 Apr 2020 05:55  Phos  6.4     04-07  Mg     2.4     04-08    TPro  7.5  /  Alb  2.8<L>  /  TBili  0.2  /  DBili  x   /  AST  52<H>  /  ALT  28  /  AlkPhos  63  04-08                            14.7   6.61  )-----------( 189      ( 08 Apr 2020 05:55 )             41.8       MEDICATIONS  (STANDING):  aspirin enteric coated 81 milliGRAM(s) Oral daily  atorvastatin 40 milliGRAM(s) Oral at bedtime  clopidogrel Tablet 75 milliGRAM(s) Oral daily  dextrose 5%. 1000 milliLiter(s) (50 mL/Hr) IV Continuous <Continuous>  dextrose 50% Injectable 12.5 Gram(s) IV Push once  dextrose 50% Injectable 25 Gram(s) IV Push once  dextrose 50% Injectable 25 Gram(s) IV Push once  heparin  Injectable 5000 Unit(s) SubCutaneous every 8 hours  hydrALAZINE 50 milliGRAM(s) Oral every 8 hours  hydroxychloroquine   Oral   hydroxychloroquine 200 milliGRAM(s) Oral every 12 hours  insulin lispro (HumaLOG) corrective regimen sliding scale   SubCutaneous three times a day before meals  metoprolol tartrate 25 milliGRAM(s) Oral two times a day  NIFEdipine XL 30 milliGRAM(s) Oral daily  sodium chloride 0.45%. 1000 milliLiter(s) (75 mL/Hr) IV Continuous <Continuous>    MEDICATIONS  (PRN):  acetaminophen   Tablet .. 650 milliGRAM(s) Oral every 6 hours PRN Temp greater or equal to 38C (100.4F)  acetaminophen   Tablet .. 650 milliGRAM(s) Oral every 6 hours PRN Temp greater or equal to 38C (100.4F), Moderate Pain (4 - 6)  dextrose 40% Gel 15 Gram(s) Oral once PRN Blood Glucose LESS THAN 70 milliGRAM(s)/deciliter  glucagon  Injectable 1 milliGRAM(s) IntraMuscular once PRN Glucose LESS THAN 70 milligrams/deciliter          iStop:    Reference #: [id number]    There are no results for the search terms that you entered.    PPS  Level   10%       Note PPS = Palliative Performance Scale; (c)2001, [facility] Society       Range from 100% meaning Full ambulation/self-care/intake/Level of Consicous                                                                              to        10% meaning Bedbound/Unable to do any activity/extensive disease /Total Care/ No PO intake/ LOC=Full/drowsy/+/-confusion        (0% = death)                     Prior to acute illness, patient's functionality reportedly in assisted living, OOB to wheelchair with full ADL assist

## 2020-04-09 LAB
ALBUMIN SERPL ELPH-MCNC: 2.8 G/DL — LOW (ref 3.5–5.2)
ALP SERPL-CCNC: 71 U/L — SIGNIFICANT CHANGE UP (ref 30–115)
ALT FLD-CCNC: 24 U/L — SIGNIFICANT CHANGE UP (ref 0–41)
ANION GAP SERPL CALC-SCNC: 25 MMOL/L — HIGH (ref 7–14)
AST SERPL-CCNC: 34 U/L — SIGNIFICANT CHANGE UP (ref 0–41)
BASOPHILS # BLD AUTO: 0.01 K/UL — SIGNIFICANT CHANGE UP (ref 0–0.2)
BASOPHILS NFR BLD AUTO: 0.2 % — SIGNIFICANT CHANGE UP (ref 0–1)
BILIRUB SERPL-MCNC: 0.3 MG/DL — SIGNIFICANT CHANGE UP (ref 0.2–1.2)
BUN SERPL-MCNC: 123 MG/DL — CRITICAL HIGH (ref 10–20)
CALCIUM SERPL-MCNC: 8.6 MG/DL — SIGNIFICANT CHANGE UP (ref 8.5–10.1)
CHLORIDE SERPL-SCNC: 93 MMOL/L — LOW (ref 98–110)
CO2 SERPL-SCNC: 21 MMOL/L — SIGNIFICANT CHANGE UP (ref 17–32)
CREAT SERPL-MCNC: 8.7 MG/DL — CRITICAL HIGH (ref 0.7–1.5)
EOSINOPHIL # BLD AUTO: 0.01 K/UL — SIGNIFICANT CHANGE UP (ref 0–0.7)
EOSINOPHIL NFR BLD AUTO: 0.2 % — SIGNIFICANT CHANGE UP (ref 0–8)
GLUCOSE BLDC GLUCOMTR-MCNC: 60 MG/DL — LOW (ref 70–99)
GLUCOSE BLDC GLUCOMTR-MCNC: 73 MG/DL — SIGNIFICANT CHANGE UP (ref 70–99)
GLUCOSE BLDC GLUCOMTR-MCNC: 85 MG/DL — SIGNIFICANT CHANGE UP (ref 70–99)
GLUCOSE BLDC GLUCOMTR-MCNC: 88 MG/DL — SIGNIFICANT CHANGE UP (ref 70–99)
GLUCOSE BLDC GLUCOMTR-MCNC: 95 MG/DL — SIGNIFICANT CHANGE UP (ref 70–99)
GLUCOSE SERPL-MCNC: 67 MG/DL — LOW (ref 70–99)
HCT VFR BLD CALC: 40.5 % — SIGNIFICANT CHANGE UP (ref 37–47)
HGB BLD-MCNC: 13.6 G/DL — SIGNIFICANT CHANGE UP (ref 12–16)
IMM GRANULOCYTES NFR BLD AUTO: 1.4 % — HIGH (ref 0.1–0.3)
LYMPHOCYTES # BLD AUTO: 0.39 K/UL — LOW (ref 1.2–3.4)
LYMPHOCYTES # BLD AUTO: 5.9 % — LOW (ref 20.5–51.1)
MCHC RBC-ENTMCNC: 31.9 PG — HIGH (ref 27–31)
MCHC RBC-ENTMCNC: 33.6 G/DL — SIGNIFICANT CHANGE UP (ref 32–37)
MCV RBC AUTO: 95.1 FL — SIGNIFICANT CHANGE UP (ref 81–99)
MONOCYTES # BLD AUTO: 0.15 K/UL — SIGNIFICANT CHANGE UP (ref 0.1–0.6)
MONOCYTES NFR BLD AUTO: 2.3 % — SIGNIFICANT CHANGE UP (ref 1.7–9.3)
NEUTROPHILS # BLD AUTO: 5.98 K/UL — SIGNIFICANT CHANGE UP (ref 1.4–6.5)
NEUTROPHILS NFR BLD AUTO: 90 % — HIGH (ref 42.2–75.2)
NRBC # BLD: 0 /100 WBCS — SIGNIFICANT CHANGE UP (ref 0–0)
PLATELET # BLD AUTO: 208 K/UL — SIGNIFICANT CHANGE UP (ref 130–400)
POTASSIUM SERPL-MCNC: 3.7 MMOL/L — SIGNIFICANT CHANGE UP (ref 3.5–5)
POTASSIUM SERPL-SCNC: 3.7 MMOL/L — SIGNIFICANT CHANGE UP (ref 3.5–5)
PROT SERPL-MCNC: 7.3 G/DL — SIGNIFICANT CHANGE UP (ref 6–8)
RBC # BLD: 4.26 M/UL — SIGNIFICANT CHANGE UP (ref 4.2–5.4)
RBC # FLD: 13.3 % — SIGNIFICANT CHANGE UP (ref 11.5–14.5)
SODIUM SERPL-SCNC: 139 MMOL/L — SIGNIFICANT CHANGE UP (ref 135–146)
WBC # BLD: 6.63 K/UL — SIGNIFICANT CHANGE UP (ref 4.8–10.8)
WBC # FLD AUTO: 6.63 K/UL — SIGNIFICANT CHANGE UP (ref 4.8–10.8)

## 2020-04-09 PROCEDURE — 93010 ELECTROCARDIOGRAM REPORT: CPT

## 2020-04-09 RX ORDER — DEXTROSE 10 % IN WATER 10 %
250 INTRAVENOUS SOLUTION INTRAVENOUS ONCE
Refills: 0 | Status: COMPLETED | OUTPATIENT
Start: 2020-04-09 | End: 2020-04-09

## 2020-04-09 RX ADMIN — Medication 50 MILLIGRAM(S): at 14:39

## 2020-04-09 RX ADMIN — HEPARIN SODIUM 5000 UNIT(S): 5000 INJECTION INTRAVENOUS; SUBCUTANEOUS at 14:39

## 2020-04-09 RX ADMIN — ATORVASTATIN CALCIUM 40 MILLIGRAM(S): 80 TABLET, FILM COATED ORAL at 21:35

## 2020-04-09 RX ADMIN — Medication 25 MILLIGRAM(S): at 17:19

## 2020-04-09 RX ADMIN — HEPARIN SODIUM 5000 UNIT(S): 5000 INJECTION INTRAVENOUS; SUBCUTANEOUS at 06:26

## 2020-04-09 RX ADMIN — Medication 81 MILLIGRAM(S): at 14:39

## 2020-04-09 RX ADMIN — Medication 500 MILLILITER(S): at 18:34

## 2020-04-09 RX ADMIN — HEPARIN SODIUM 5000 UNIT(S): 5000 INJECTION INTRAVENOUS; SUBCUTANEOUS at 21:35

## 2020-04-09 RX ADMIN — Medication 50 MILLIGRAM(S): at 21:35

## 2020-04-09 RX ADMIN — Medication 0.5 MILLIGRAM(S): at 06:13

## 2020-04-09 RX ADMIN — Medication 200 MILLIGRAM(S): at 14:40

## 2020-04-09 RX ADMIN — CLOPIDOGREL BISULFATE 75 MILLIGRAM(S): 75 TABLET, FILM COATED ORAL at 14:39

## 2020-04-09 RX ADMIN — SODIUM CHLORIDE 75 MILLILITER(S): 9 INJECTION, SOLUTION INTRAVENOUS at 01:22

## 2020-04-09 NOTE — CHART NOTE - NSCHARTNOTEFT_GEN_A_CORE
LMSW reached out to son; extensive support provided.  Son confirms wish for DNR/I with ongoing medical management.  Plan to reach out to son tomorrow, 4/10.

## 2020-04-09 NOTE — PROGRESS NOTE ADULT - SUBJECTIVE AND OBJECTIVE BOX
Nephrology progress note    THIS IS AN INCOMPLETE NOTE . FULL NOTE TO FOLLOW SHORTLY    Patient is seen and examined, events over the last 24 h noted .    Allergies:  No Known Allergies    Hospital Medications:   MEDICATIONS  (STANDING):  aspirin enteric coated 81 milliGRAM(s) Oral daily  atorvastatin 40 milliGRAM(s) Oral at bedtime  clopidogrel Tablet 75 milliGRAM(s) Oral daily  dextrose 5%. 1000 milliLiter(s) (50 mL/Hr) IV Continuous <Continuous>  dextrose 50% Injectable 12.5 Gram(s) IV Push once  dextrose 50% Injectable 25 Gram(s) IV Push once  dextrose 50% Injectable 25 Gram(s) IV Push once  heparin  Injectable 5000 Unit(s) SubCutaneous every 8 hours  hydrALAZINE 50 milliGRAM(s) Oral every 8 hours  hydroxychloroquine   Oral   hydroxychloroquine 200 milliGRAM(s) Oral every 12 hours  insulin lispro (HumaLOG) corrective regimen sliding scale   SubCutaneous three times a day before meals  metoprolol tartrate 25 milliGRAM(s) Oral two times a day  NIFEdipine XL 30 milliGRAM(s) Oral daily  sodium chloride 0.45%. 1000 milliLiter(s) (75 mL/Hr) IV Continuous <Continuous>        VITALS:  T(F): 95 (04-09-20 @ 04:30), Max: 96 (04-08-20 @ 12:26)  HR: 80 (04-09-20 @ 06:30)  BP: 158/92 (04-09-20 @ 06:30)  RR: 18 (04-09-20 @ 06:30)  SpO2: 96% (04-09-20 @ 04:30)  Wt(kg): --    04-07 @ 07:01  -  04-08 @ 07:00  --------------------------------------------------------  IN: 825 mL / OUT: 0 mL / NET: 825 mL    04-08 @ 07:01  -  04-09 @ 07:00  --------------------------------------------------------  IN: 1050 mL / OUT: 0 mL / NET: 1050 mL          PHYSICAL EXAM:  Constitutional: NAD  HEENT: anicteric sclera, oropharynx clear, MMM  Neck: No JVD  Respiratory: CTAB, no wheezes, rales or rhonchi  Cardiovascular: S1, S2, RRR  Gastrointestinal: BS+, soft, NT/ND  Extremities: No cyanosis or clubbing. No peripheral edema  :  No patterson.   Skin: No rashes    LABS:  04-08    146  |  98  |  128<HH>  ----------------------------<  128<H>  4.4   |  23  |  9.3<HH>    Ca    8.8      08 Apr 2020 05:55  Phos  6.4     04-07  Mg     2.4     04-08    TPro  7.5  /  Alb  2.8<L>  /  TBili  0.2  /  DBili      /  AST  52<H>  /  ALT  28  /  AlkPhos  63  04-08                          14.7   6.61  )-----------( 189      ( 08 Apr 2020 05:55 )             41.8       Urine Studies:      RADIOLOGY & ADDITIONAL STUDIES: Nephrology progress note  Patient is seen and examined, events over the last 24 h noted .  lying in bed with NRB face mask  Lethargic     Allergies:  No Known Allergies    Hospital Medications:   MEDICATIONS  (STANDING):  aspirin enteric coated 81 milliGRAM(s) Oral daily  atorvastatin 40 milliGRAM(s) Oral at bedtime  clopidogrel Tablet 75 milliGRAM(s) Oral daily  dextrose 5%. 1000 milliLiter(s) (50 mL/Hr) IV Continuous <Continuous>  heparin  Injectable 5000 Unit(s) SubCutaneous every 8 hours  hydrALAZINE 50 milliGRAM(s) Oral every 8 hours  hydroxychloroquine 200 milliGRAM(s) Oral every 12 hours  insulin lispro (HumaLOG) corrective regimen sliding scale   SubCutaneous three times a day before meals  metoprolol tartrate 25 milliGRAM(s) Oral two times a day  NIFEdipine XL 30 milliGRAM(s) Oral daily  sodium chloride 0.45%. 1000 milliLiter(s) (75 mL/Hr) IV Continuous <Continuous>        VITALS:  T(F): 95 (04-09-20 @ 04:30), Max: 96 (04-08-20 @ 12:26)  HR: 80 (04-09-20 @ 06:30)  BP: 158/92 (04-09-20 @ 06:30)  RR: 18 (04-09-20 @ 06:30)  SpO2: 96% (04-09-20 @ 04:30)      04-07 @ 07:01  -  04-08 @ 07:00  --------------------------------------------------------  IN: 825 mL / OUT: 0 mL / NET: 825 mL    04-08 @ 07:01  -  04-09 @ 07:00  --------------------------------------------------------  IN: 1050 mL / OUT: 0 mL / NET: 1050 mL          PHYSICAL EXAM:  Constitutional: confused lethargic   HEENT: anicteric sclera, oropharynx clear, MMM  Neck: No JVD  Respiratory: CTAB, no wheezes, rales or rhonchi  Cardiovascular: S1, S2, RRR  Gastrointestinal: BS+, soft, NT/ND  Extremities: No cyanosis or clubbing. No peripheral edema  :  No patterson.   Skin: No rashes    LABS:  04-08    146  |  98  |  128<HH>  ----------------------------<  128<H>  4.4   |  23  |  9.3<HH>    Ca    8.8      08 Apr 2020 05:55  Phos  6.4     04-07  Mg     2.4     04-08    TPro  7.5  /  Alb  2.8<L>  /  TBili  0.2  /  DBili      /  AST  52<H>  /  ALT  28  /  AlkPhos  63  04-08                          14.7   6.61  )-----------( 189      ( 08 Apr 2020 05:55 )             41.8       Urine Studies:      RADIOLOGY & ADDITIONAL STUDIES:

## 2020-04-09 NOTE — PROGRESS NOTE ADULT - ASSESSMENT
70 yo female with a pmh od CKD (stage 5 per son  kidneys at "7-8 %" no plan for Hd" , HTN, and dementia present for elevated BUN/creatine.     # CKD 4-5 creat stable/ would continue IVF / K better today / bicarb better / no need for RRT/ creat worse   CAse discussed with son Lauro Johnson yesterday over the phone and advised against starting RRT giving mother comorbidities and PMH and to continue medical management / Son agreeable to plan / will not do HD   repeat BMP please   strict I and O   check UA   HyperK noted, better   Bp better controlled today / keep current   will follow

## 2020-04-09 NOTE — PROGRESS NOTE ADULT - SUBJECTIVE AND OBJECTIVE BOX
Hospital Day:  3d    Chief Complaint: Patient is a 69y old  Female who presents with a chief complaint of Sent from NH for evaluation for non productive cough and abnormal labs, subsequently diagnosed with COVID-19 pneumonia.    24 hour events:      Subjective:      Past Medical Hx:   Dementia  DM (diabetes mellitus), secondary  Dyslipidemia  Anemia  Afib  Depression  HTN (hypertension)  CKD (chronic kidney disease)    Past Sx:  No significant past surgical history    Allergies:  No Known Allergies    Current Meds:   Standing Meds:  aspirin enteric coated 81 milliGRAM(s) Oral daily  atorvastatin 40 milliGRAM(s) Oral at bedtime  clopidogrel Tablet 75 milliGRAM(s) Oral daily  dextrose 5%. 1000 milliLiter(s) (50 mL/Hr) IV Continuous <Continuous>  dextrose 50% Injectable 12.5 Gram(s) IV Push once  dextrose 50% Injectable 25 Gram(s) IV Push once  dextrose 50% Injectable 25 Gram(s) IV Push once  heparin  Injectable 5000 Unit(s) SubCutaneous every 8 hours  hydrALAZINE 50 milliGRAM(s) Oral every 8 hours  hydroxychloroquine   Oral   hydroxychloroquine 200 milliGRAM(s) Oral every 12 hours  insulin lispro (HumaLOG) corrective regimen sliding scale   SubCutaneous three times a day before meals  metoprolol tartrate 25 milliGRAM(s) Oral two times a day  NIFEdipine XL 30 milliGRAM(s) Oral daily  sodium chloride 0.45%. 1000 milliLiter(s) (75 mL/Hr) IV Continuous <Continuous>    PRN Meds:  acetaminophen   Tablet .. 650 milliGRAM(s) Oral every 6 hours PRN Temp greater or equal to 38C (100.4F)  acetaminophen   Tablet .. 650 milliGRAM(s) Oral every 6 hours PRN Temp greater or equal to 38C (100.4F), Moderate Pain (4 - 6)  dextrose 40% Gel 15 Gram(s) Oral once PRN Blood Glucose LESS THAN 70 milliGRAM(s)/deciliter  glucagon  Injectable 1 milliGRAM(s) IntraMuscular once PRN Glucose LESS THAN 70 milligrams/deciliter  HYDROmorphone  Injectable 0.5 milliGRAM(s) IV Push every 6 hours PRN dyspnea  LORazepam   Injectable 0.5 milliGRAM(s) IV Push every 4 hours PRN restlessness    HOME MEDICATIONS:  aspirin 81 mg oral tablet: 1 tab(s) orally once a day  atorvastatin 40 mg oral tablet: 1 tab(s) orally once a day  ferrous sulfate 325 mg (65 mg elemental iron) oral tablet: 1 tab(s) orally once a day  hydrALAZINE 25 mg oral tablet: 3 tab(s) orally 2 times a day  Lasix 20 mg oral tablet: 1 tab(s) orally once a day  Metoprolol Tartrate 25 mg oral tablet: 1 tab(s) orally 2 times a day  NIFEdipine 30 mg oral tablet, extended release: 1 tab(s) orally once a day  Plavix 75 mg oral tablet: 1 tab(s) orally once a day  Tylenol 325 mg oral tablet: 2 tab(s) orally every 6 hours, As Needed  Vitamin D3 2000 intl units (50 mcg) oral tablet: 1 tab(s) orally once a day      Vital Signs:   T(F): 95 (04-09-20 @ 04:30), Max: 96 (04-08-20 @ 12:26)  HR: 80 (04-09-20 @ 06:30) (78 - 94)  BP: 158/92 (04-09-20 @ 06:30) (130/77 - 160/87)  RR: 18 (04-09-20 @ 06:30) (18 - 20)  SpO2: 92% (04-09-20 @ 09:37) (84% - 96%)      04-08-20 @ 07:01  -  04-09-20 @ 07:00  --------------------------------------------------------  IN: 1050 mL / OUT: 0 mL / NET: 1050 mL        Physical Exam:   GENERAL: NAD  HEENT: NCAT  CHEST/LUNG: CTAB  HEART: Regular rate and rhythm. No murmurs, rubs, or gallops  ABDOMEN: soft, non-tender, non-distended  EXTREMITIES: No LE edema b/l  NERVOUS SYSTEM: Alert and oriented x 0    Labs:                         14.7   6.61  )-----------( 189      ( 08 Apr 2020 05:55 )             41.8       08 Apr 2020 05:55    146    |  98     |  128    ----------------------------<  128    4.4     |  23     |  9.3      Ca    8.8        08 Apr 2020 05:55  Phos  6.4       07 Apr 2020 11:28  Mg     2.4       08 Apr 2020 05:55    TPro  7.5    /  Alb  2.8    /  TBili  0.2    /  DBili  x      /  AST  52     /  ALT  28     /  AlkPhos  63     08 Apr 2020 05:55       pTT    32.1             ----< 0.88 INR  (04-08-20 @ 10:00)    10.10        PT      Troponin --, CKMB 13.1,  04-07-20 @ 11:28    COVID-19 PCR: Detected (04-06)    Procalcitonin, Serum: 1.68 ng/mL (04-08)    C-Reactive Protein, Serum: 16.37 mg/dL [0.00 - 0.40] (04-08)    D-Dimer Assay, Quantitative: 2415 ng/mL DDU [0 - 230] (04-08)      Lactate Dehydrogenase, Serum: 703 [50 - 242] (04-07)    Radiology:   < from: Xray Chest 1 View-PORTABLE IMMEDIATE (04.08.20 @ 09:39) >  Impression:      Stable right greater than left airspace opacifications.    < end of copied text >    Assessment and Plan:   Patient is a 69y old  Female who presents with a chief complaint of Sent from NH for evaluation for non productive cough and abnormal labs, subsequently diagnosed with COVID-19 pneumonia.    #Acute hypoxic respiratory failure 2/2 to COVID-19 pneumonia:  -COVID-19 PCR: Detected (04-06)  -SpO2: 92% on 15L NRB (04-09 @ 09:37) (84% - 96%)  -QTc: 469 on admission (4/6)  -continue with plaquenil (4/7-4/12)  -start dilaudid prn for dyspnea  -Procalcitonin: 1.68 (04-08)  -CRP: 16.37 (04-08)  -D-dimer: 2415 (04-08)  -LDH: 703 (04-07)  -Ferritin: -Troponin:     #WILIAM on CKD with HAGMA:   -Per nephrology: Son does not want HD  -Cr on admission 8.3, worsening to 9.3; Baseline ~3.5 per Dr. Grajeda  -continue with 1/2NS @ 75mL/hr  -Hold home dose of lasix 20mg   -Place patterson catheter. Monitor strict I/Os    #Hyperkalemia 2/2 to WILIAM: resolved    #Hypertensive urgency: well-controlled now  -BP on admission 181/107  -Will restart home dose of procardia and hydralazine.   -Can add labetalol as needed.     #T2DM - Pt not on oral agents. Monitor FS [id number] for now. Insulin sliding scale  #Afib- Unclear why pt not on AC. C/w metoprolol   #Dementia- at baseline  #HLD-c/w statin  #Unclear why pt is on plavix -could not find pt file in ER. NH staff did not know  #LUCIANO- Hb normal   _____________________________________________  #DVT ppx: heparin sq  #GI ppx: not indicated  #Diet: DASH CC soft  #Actvity: IAT  #DNR/DNI - MOLST completed 4/8    Family discussion: discussed extensively with son on 4/8   DISPO: remains acute. Pending improvement in oxygen requirements and WILIAM. Hospital Day:  3d    Chief Complaint: Patient is a 69y old  Female who presents with a chief complaint of Sent from NH for evaluation for non productive cough and abnormal labs, subsequently diagnosed with COVID-19 pneumonia.    24 hour events:  -No acute events overnight  Saturating 92% on 15L NRB  made DNR/DNI yesterday    Subjective:  Unable to obtain ROS    Past Medical Hx:   Dementia  DM (diabetes mellitus), secondary  Dyslipidemia  Anemia  Afib  Depression  HTN (hypertension)  CKD (chronic kidney disease)    Past Sx:  No significant past surgical history    Allergies:  No Known Allergies    Current Meds:   Standing Meds:  aspirin enteric coated 81 milliGRAM(s) Oral daily  atorvastatin 40 milliGRAM(s) Oral at bedtime  clopidogrel Tablet 75 milliGRAM(s) Oral daily  dextrose 5%. 1000 milliLiter(s) (50 mL/Hr) IV Continuous <Continuous>  dextrose 50% Injectable 12.5 Gram(s) IV Push once  dextrose 50% Injectable 25 Gram(s) IV Push once  dextrose 50% Injectable 25 Gram(s) IV Push once  heparin  Injectable 5000 Unit(s) SubCutaneous every 8 hours  hydrALAZINE 50 milliGRAM(s) Oral every 8 hours  hydroxychloroquine   Oral   hydroxychloroquine 200 milliGRAM(s) Oral every 12 hours  insulin lispro (HumaLOG) corrective regimen sliding scale   SubCutaneous three times a day before meals  metoprolol tartrate 25 milliGRAM(s) Oral two times a day  NIFEdipine XL 30 milliGRAM(s) Oral daily  sodium chloride 0.45%. 1000 milliLiter(s) (75 mL/Hr) IV Continuous <Continuous>    PRN Meds:  acetaminophen   Tablet .. 650 milliGRAM(s) Oral every 6 hours PRN Temp greater or equal to 38C (100.4F)  acetaminophen   Tablet .. 650 milliGRAM(s) Oral every 6 hours PRN Temp greater or equal to 38C (100.4F), Moderate Pain (4 - 6)  dextrose 40% Gel 15 Gram(s) Oral once PRN Blood Glucose LESS THAN 70 milliGRAM(s)/deciliter  glucagon  Injectable 1 milliGRAM(s) IntraMuscular once PRN Glucose LESS THAN 70 milligrams/deciliter  HYDROmorphone  Injectable 0.5 milliGRAM(s) IV Push every 6 hours PRN dyspnea  LORazepam   Injectable 0.5 milliGRAM(s) IV Push every 4 hours PRN restlessness    HOME MEDICATIONS:  aspirin 81 mg oral tablet: 1 tab(s) orally once a day  atorvastatin 40 mg oral tablet: 1 tab(s) orally once a day  ferrous sulfate 325 mg (65 mg elemental iron) oral tablet: 1 tab(s) orally once a day  hydrALAZINE 25 mg oral tablet: 3 tab(s) orally 2 times a day  Lasix 20 mg oral tablet: 1 tab(s) orally once a day  Metoprolol Tartrate 25 mg oral tablet: 1 tab(s) orally 2 times a day  NIFEdipine 30 mg oral tablet, extended release: 1 tab(s) orally once a day  Plavix 75 mg oral tablet: 1 tab(s) orally once a day  Tylenol 325 mg oral tablet: 2 tab(s) orally every 6 hours, As Needed  Vitamin D3 2000 intl units (50 mcg) oral tablet: 1 tab(s) orally once a day    Vital Signs:   T(F): 95 (04-09-20 @ 04:30), Max: 96 (04-08-20 @ 12:26)  HR: 80 (04-09-20 @ 06:30) (78 - 94)  BP: 158/92 (04-09-20 @ 06:30) (130/77 - 160/87)  RR: 18 (04-09-20 @ 06:30) (18 - 20)  SpO2: 92% (04-09-20 @ 09:37) (84% - 96%)      04-08-20 @ 07:01  -  04-09-20 @ 07:00  --------------------------------------------------------  IN: 1050 mL / OUT: 0 mL / NET: 1050 mL      Physical Exam:   GENERAL: NAD  HEENT: NCAT  CHEST/LUNG: CTAB  HEART: Regular rate and rhythm. No murmurs, rubs, or gallops  ABDOMEN: soft, non-tender, non-distended  EXTREMITIES: No LE edema b/l  NERVOUS SYSTEM: Alert and oriented x 0    Labs:                         14.7   6.61  )-----------( 189      ( 08 Apr 2020 05:55 )             41.8       08 Apr 2020 05:55    146    |  98     |  128    ----------------------------<  128    4.4     |  23     |  9.3      Ca    8.8        08 Apr 2020 05:55  Phos  6.4       07 Apr 2020 11:28  Mg     2.4       08 Apr 2020 05:55    TPro  7.5    /  Alb  2.8    /  TBili  0.2    /  DBili  x      /  AST  52     /  ALT  28     /  AlkPhos  63     08 Apr 2020 05:55       pTT    32.1             ----< 0.88 INR  (04-08-20 @ 10:00)    10.10        PT      Troponin --, CKMB 13.1,  04-07-20 @ 11:28    COVID-19 PCR: Detected (04-06)    Procalcitonin, Serum: 1.68 ng/mL (04-08)    C-Reactive Protein, Serum: 16.37 mg/dL [0.00 - 0.40] (04-08)    D-Dimer Assay, Quantitative: 2415 ng/mL DDU [0 - 230] (04-08)      Lactate Dehydrogenase, Serum: 703 [50 - 242] (04-07)    Radiology:   < from: Xray Chest 1 View-PORTABLE IMMEDIATE (04.08.20 @ 09:39) >  Impression:      Stable right greater than left airspace opacifications.    < end of copied text >    Assessment and Plan:   Patient is a 69y old  Female who presents with a chief complaint of Sent from NH for evaluation for non productive cough and abnormal labs, subsequently diagnosed with COVID-19 pneumonia.    #Acute hypoxic respiratory failure 2/2 to COVID-19 pneumonia:  -COVID-19 PCR: Detected (04-06)  -SpO2: 92% on 15L NRB (04-09 @ 09:37) (84% - 96%)  -QTc: 469 on admission (4/6)  -continue with plaquenil (4/7-4/12)  -start dilaudid prn for dyspnea  -Procalcitonin: 1.68 (04-08)  -CRP: 16.37 (04-08)  -D-dimer: 2415 (04-08)  -LDH: 703 (04-07)  -Ferritin: -Troponin:     #WILIAM on CKD with HAGMA:   -Per nephrology: Son does not want HD  -Cr on admission 8.3, worsening to 9.3; Baseline ~3.5 per Dr. Grajeda  -continue with 1/2NS @ 75mL/hr  -Hold home dose of lasix 20mg   -Place patterson catheter. Monitor strict I/Os    #Hyperkalemia 2/2 to WILIAM: resolved    #Hypertensive urgency: well-controlled now  -BP on admission 181/107  -Will restart home dose of procardia and hydralazine.   -Can add labetalol as needed.     #T2DM - Pt not on oral agents. Monitor FS [id number] for now. Insulin sliding scale  #Afib- Unclear why pt not on AC. C/w metoprolol   #Dementia- at baseline  #HLD-c/w statin  #Unclear why pt is on plavix -could not find pt file in ER. NH staff did not know  #LUCIANO- Hb normal   _____________________________________________  #DVT ppx: heparin sq  #GI ppx: not indicated  #Diet: DASH CC soft  #Actvity: IAT  #DNR/DNI - MOLST completed 4/8    Family discussion: discussed extensively with son on 4/8   DISPO: remains acute. Pending improvement in oxygen requirements and WILIAM.

## 2020-04-10 LAB
ALBUMIN SERPL ELPH-MCNC: 2.4 G/DL — LOW (ref 3.5–5.2)
ALP SERPL-CCNC: 78 U/L — SIGNIFICANT CHANGE UP (ref 30–115)
ALT FLD-CCNC: 23 U/L — SIGNIFICANT CHANGE UP (ref 0–41)
ANION GAP SERPL CALC-SCNC: 24 MMOL/L — HIGH (ref 7–14)
AST SERPL-CCNC: 32 U/L — SIGNIFICANT CHANGE UP (ref 0–41)
BASOPHILS # BLD AUTO: 0.01 K/UL — SIGNIFICANT CHANGE UP (ref 0–0.2)
BASOPHILS NFR BLD AUTO: 0.2 % — SIGNIFICANT CHANGE UP (ref 0–1)
BILIRUB SERPL-MCNC: <0.2 MG/DL — SIGNIFICANT CHANGE UP (ref 0.2–1.2)
BUN SERPL-MCNC: 124 MG/DL — CRITICAL HIGH (ref 10–20)
CALCIUM SERPL-MCNC: 8.7 MG/DL — SIGNIFICANT CHANGE UP (ref 8.5–10.1)
CHLORIDE SERPL-SCNC: 91 MMOL/L — LOW (ref 98–110)
CO2 SERPL-SCNC: 20 MMOL/L — SIGNIFICANT CHANGE UP (ref 17–32)
CREAT SERPL-MCNC: 8.6 MG/DL — CRITICAL HIGH (ref 0.7–1.5)
D DIMER BLD IA.RAPID-MCNC: 3480 NG/ML DDU — HIGH (ref 0–230)
EOSINOPHIL # BLD AUTO: 0 K/UL — SIGNIFICANT CHANGE UP (ref 0–0.7)
EOSINOPHIL NFR BLD AUTO: 0 % — SIGNIFICANT CHANGE UP (ref 0–8)
GLUCOSE BLDC GLUCOMTR-MCNC: 108 MG/DL — HIGH (ref 70–99)
GLUCOSE BLDC GLUCOMTR-MCNC: 66 MG/DL — LOW (ref 70–99)
GLUCOSE BLDC GLUCOMTR-MCNC: 74 MG/DL — SIGNIFICANT CHANGE UP (ref 70–99)
GLUCOSE BLDC GLUCOMTR-MCNC: 76 MG/DL — SIGNIFICANT CHANGE UP (ref 70–99)
GLUCOSE BLDC GLUCOMTR-MCNC: 81 MG/DL — SIGNIFICANT CHANGE UP (ref 70–99)
GLUCOSE BLDC GLUCOMTR-MCNC: 91 MG/DL — SIGNIFICANT CHANGE UP (ref 70–99)
GLUCOSE SERPL-MCNC: 77 MG/DL — SIGNIFICANT CHANGE UP (ref 70–99)
HCT VFR BLD CALC: 40.7 % — SIGNIFICANT CHANGE UP (ref 37–47)
HGB BLD-MCNC: 13.4 G/DL — SIGNIFICANT CHANGE UP (ref 12–16)
IMM GRANULOCYTES NFR BLD AUTO: 0.9 % — HIGH (ref 0.1–0.3)
LYMPHOCYTES # BLD AUTO: 0.36 K/UL — LOW (ref 1.2–3.4)
LYMPHOCYTES # BLD AUTO: 5.5 % — LOW (ref 20.5–51.1)
MAGNESIUM SERPL-MCNC: 2.2 MG/DL — SIGNIFICANT CHANGE UP (ref 1.8–2.4)
MCHC RBC-ENTMCNC: 31.5 PG — HIGH (ref 27–31)
MCHC RBC-ENTMCNC: 32.9 G/DL — SIGNIFICANT CHANGE UP (ref 32–37)
MCV RBC AUTO: 95.5 FL — SIGNIFICANT CHANGE UP (ref 81–99)
MONOCYTES # BLD AUTO: 0.13 K/UL — SIGNIFICANT CHANGE UP (ref 0.1–0.6)
MONOCYTES NFR BLD AUTO: 2 % — SIGNIFICANT CHANGE UP (ref 1.7–9.3)
NEUTROPHILS # BLD AUTO: 5.98 K/UL — SIGNIFICANT CHANGE UP (ref 1.4–6.5)
NEUTROPHILS NFR BLD AUTO: 91.4 % — HIGH (ref 42.2–75.2)
NRBC # BLD: 0 /100 WBCS — SIGNIFICANT CHANGE UP (ref 0–0)
PLATELET # BLD AUTO: 208 K/UL — SIGNIFICANT CHANGE UP (ref 130–400)
POTASSIUM SERPL-MCNC: 4 MMOL/L — SIGNIFICANT CHANGE UP (ref 3.5–5)
POTASSIUM SERPL-SCNC: 4 MMOL/L — SIGNIFICANT CHANGE UP (ref 3.5–5)
PROT SERPL-MCNC: 6.7 G/DL — SIGNIFICANT CHANGE UP (ref 6–8)
RBC # BLD: 4.26 M/UL — SIGNIFICANT CHANGE UP (ref 4.2–5.4)
RBC # FLD: 13.2 % — SIGNIFICANT CHANGE UP (ref 11.5–14.5)
SODIUM SERPL-SCNC: 135 MMOL/L — SIGNIFICANT CHANGE UP (ref 135–146)
WBC # BLD: 6.54 K/UL — SIGNIFICANT CHANGE UP (ref 4.8–10.8)
WBC # FLD AUTO: 6.54 K/UL — SIGNIFICANT CHANGE UP (ref 4.8–10.8)

## 2020-04-10 PROCEDURE — 99232 SBSQ HOSP IP/OBS MODERATE 35: CPT

## 2020-04-10 RX ORDER — HALOPERIDOL DECANOATE 100 MG/ML
0.5 INJECTION INTRAMUSCULAR EVERY 6 HOURS
Refills: 0 | Status: DISCONTINUED | OUTPATIENT
Start: 2020-04-10 | End: 2020-04-14

## 2020-04-10 RX ORDER — MORPHINE SULFATE 50 MG/1
10 CAPSULE, EXTENDED RELEASE ORAL EVERY 4 HOURS
Refills: 0 | Status: DISCONTINUED | OUTPATIENT
Start: 2020-04-10 | End: 2020-04-14

## 2020-04-10 RX ADMIN — HEPARIN SODIUM 5000 UNIT(S): 5000 INJECTION INTRAVENOUS; SUBCUTANEOUS at 22:36

## 2020-04-10 RX ADMIN — MORPHINE SULFATE 10 MILLIGRAM(S): 50 CAPSULE, EXTENDED RELEASE ORAL at 19:06

## 2020-04-10 RX ADMIN — Medication 25 MILLIGRAM(S): at 19:01

## 2020-04-10 RX ADMIN — HEPARIN SODIUM 5000 UNIT(S): 5000 INJECTION INTRAVENOUS; SUBCUTANEOUS at 13:29

## 2020-04-10 RX ADMIN — Medication 50 MILLIGRAM(S): at 13:29

## 2020-04-10 RX ADMIN — CLOPIDOGREL BISULFATE 75 MILLIGRAM(S): 75 TABLET, FILM COATED ORAL at 13:29

## 2020-04-10 RX ADMIN — Medication 200 MILLIGRAM(S): at 13:29

## 2020-04-10 RX ADMIN — HEPARIN SODIUM 5000 UNIT(S): 5000 INJECTION INTRAVENOUS; SUBCUTANEOUS at 05:58

## 2020-04-10 RX ADMIN — HALOPERIDOL DECANOATE 0.5 MILLIGRAM(S): 100 INJECTION INTRAMUSCULAR at 19:04

## 2020-04-10 RX ADMIN — Medication 81 MILLIGRAM(S): at 13:29

## 2020-04-10 NOTE — PROGRESS NOTE ADULT - ASSESSMENT
69yFemale being evaluated for goals of care and symptom management. Spoke to nurse and medical resident pt is worsening. She has dementia and has needed to be restrained as she pulls off her mask and IVs. Currently has no SEEMA. Pt's son did not want feeding tubes or dialysis.     Call placed to son l/m to call palliative care service today. Son was clear he wants mom not to suffer and knows she has a grave prognosis. Pt continues to desaturate into the 60's when her NRB mask is off she is on 100% NRB,          Recommendations:  DNR/DNI  no dialysis  no tube feeding  for comfort  Roxanol 10mg Q 4 HRS PRN for respiratory distress  Haldol 0.5mg Q 6 HRS PRN for agitation   palliative care will continue to update yolande  nephrology is signing off

## 2020-04-10 NOTE — PROGRESS NOTE ADULT - ASSESSMENT
Hospital Day:  4d    Chief Complaint: Patient is a 69y old  Female who presents with a chief complaint of Sent from NH for evaluation for non productive cough and abnormal labs, subsequently diagnosed with COVID-19 pneumonia.    24 hour events:  -No acute events overnight  -Saturating 91% on 15L NRB, desaturates to 60s when mask is off (patient agitated and pulls mask off when not restrained)    Subjective:  Unable to obtain ROS    Past Medical Hx:   Dementia  DM (diabetes mellitus), secondary  Dyslipidemia  Anemia  Afib  Depression  HTN (hypertension)  CKD (chronic kidney disease)    Past Sx:  No significant past surgical history    Allergies:  No Known Allergies    Current Meds:   Standing Meds:  aspirin enteric coated 81 milliGRAM(s) Oral daily  atorvastatin 40 milliGRAM(s) Oral at bedtime  clopidogrel Tablet 75 milliGRAM(s) Oral daily  dextrose 5%. 1000 milliLiter(s) (50 mL/Hr) IV Continuous <Continuous>  dextrose 50% Injectable 12.5 Gram(s) IV Push once  dextrose 50% Injectable 25 Gram(s) IV Push once  dextrose 50% Injectable 25 Gram(s) IV Push once  heparin  Injectable 5000 Unit(s) SubCutaneous every 8 hours  hydrALAZINE 50 milliGRAM(s) Oral every 8 hours  hydroxychloroquine   Oral   hydroxychloroquine 200 milliGRAM(s) Oral every 12 hours  insulin lispro (HumaLOG) corrective regimen sliding scale   SubCutaneous three times a day before meals  metoprolol tartrate 25 milliGRAM(s) Oral two times a day  NIFEdipine XL 30 milliGRAM(s) Oral daily  sodium chloride 0.45%. 1000 milliLiter(s) (75 mL/Hr) IV Continuous <Continuous>    PRN Meds:  acetaminophen   Tablet .. 650 milliGRAM(s) Oral every 6 hours PRN Temp greater or equal to 38C (100.4F)  acetaminophen   Tablet .. 650 milliGRAM(s) Oral every 6 hours PRN Temp greater or equal to 38C (100.4F), Moderate Pain (4 - 6)  dextrose 40% Gel 15 Gram(s) Oral once PRN Blood Glucose LESS THAN 70 milliGRAM(s)/deciliter  glucagon  Injectable 1 milliGRAM(s) IntraMuscular once PRN Glucose LESS THAN 70 milligrams/deciliter  haloperidol    Concentrate 0.5 milliGRAM(s) Oral every 6 hours PRN agitation  morphine Concentrate 10 milliGRAM(s) Oral every 4 hours PRN respiratory distress    HOME MEDICATIONS:  aspirin 81 mg oral tablet: 1 tab(s) orally once a day  atorvastatin 40 mg oral tablet: 1 tab(s) orally once a day  ferrous sulfate 325 mg (65 mg elemental iron) oral tablet: 1 tab(s) orally once a day  hydrALAZINE 25 mg oral tablet: 3 tab(s) orally 2 times a day  Lasix 20 mg oral tablet: 1 tab(s) orally once a day  Metoprolol Tartrate 25 mg oral tablet: 1 tab(s) orally 2 times a day  NIFEdipine 30 mg oral tablet, extended release: 1 tab(s) orally once a day  Plavix 75 mg oral tablet: 1 tab(s) orally once a day  Tylenol 325 mg oral tablet: 2 tab(s) orally every 6 hours, As Needed  Vitamin D3 2000 intl units (50 mcg) oral tablet: 1 tab(s) orally once a day    Vital Signs:   T(F): 95.9 (04-10-20 @ 10:00), Max: 98.9 (04-09-20 @ 21:03)  HR: 72 (04-10-20 @ 10:00) (70 - 93)  BP: 148/85 (04-10-20 @ 10:00) (140/84 - 165/90)  RR: 20 (04-10-20 @ 10:00) (18 - 22)  SpO2: 91% on 15L NRB (04-10-20 @ 01:04) (91% - 94%)      04-09-20 @ 07:01  -  04-10-20 @ 07:00  --------------------------------------------------------  IN: 825 mL / OUT: 1 mL / NET: 824 mL    Physical Exam:   GENERAL: NAD  HEENT: NCAT  CHEST/LUNG: CTAB  HEART: Regular rate and rhythm. No murmurs, rubs, or gallops  ABDOMEN: soft, non-tender, non-distended  EXTREMITIES: No LE edema b/l  NERVOUS SYSTEM: Alert and oriented x 3    Labs:                         13.6   6.63  )-----------( 208      ( 09 Apr 2020 12:29 )             40.5     Neutophil% 90.0, Lymphocyte% 5.9, Monocyte% 2.3, Bands% 1.4 04-09-20 @ 12:29    09 Apr 2020 12:29    139    |  93     |  123    ----------------------------<  67     3.7     |  21     |  8.7      Ca    8.6        09 Apr 2020 12:29    TPro  7.3    /  Alb  2.8    /  TBili  0.3    /  DBili  x      /  AST  34     /  ALT  24     /  AlkPhos  71     09 Apr 2020 12:29      Troponin --, CKMB 13.1,  04-07-20 @ 11:28    COVID-19 PCR: Detected (04-06)  Procalcitonin, Serum: 1.68 ng/mL (04-08)  C-Reactive Protein, Serum: 16.37 mg/dL [0.00 - 0.40] (04-08)  D-Dimer Assay, Quantitative: 2415 ng/mL DDU [0 - 230] (04-08)  Lactate Dehydrogenase, Serum: 703 [50 - 242] (04-07)    Radiology:       Assessment and Plan:   Patient is a 69y old  Female who presents with a chief complaint of Sent from NH for evaluation for non productive cough and abnormal labs, subsequently diagnosed with COVID-19 pneumonia.    #Acute hypoxic respiratory failure 2/2 to COVID-19 pneumonia:  -COVID-19 PCR: Detected (04-06)  -SpO2: 91% (04-10 @ 01:04) (91% - 94%)  -QTc:   -Procalcitonin: 1.68 (04-08)  -CRP: 16.37 (04-08)  -D-dimer: 2415 (04-08)  -LDH: 703 (04-07)  -Ferritin: -Troponin:     _____________________________________________  DVT ppx:  GI ppx:  Activity:  Diet:   Code Status:     Family discussion:  DISPO: remains acute. Pending improvement in oxygen requirements.

## 2020-04-10 NOTE — PROGRESS NOTE ADULT - SUBJECTIVE AND OBJECTIVE BOX
Hospital Day:  4d    Chief Complaint: Patient is a 69y old  Female who presents with a chief complaint of Sent from NH for evaluation for non productive cough and abnormal labs, subsequently diagnosed with COVID-19 pneumonia.    24 hour events:  -No acute events overnight  -Saturating 91% on 15L NRB, desaturates to 60s when mask is off (patient agitated and pulls mask off when not restrained)    Subjective:  Unable to obtain ROS    Past Medical Hx:   Dementia  DM (diabetes mellitus), secondary  Dyslipidemia  Anemia  Afib  Depression  HTN (hypertension)  CKD (chronic kidney disease)    Past Sx:  No significant past surgical history    Allergies:  No Known Allergies    Current Meds:   Standing Meds:  aspirin enteric coated 81 milliGRAM(s) Oral daily  atorvastatin 40 milliGRAM(s) Oral at bedtime  clopidogrel Tablet 75 milliGRAM(s) Oral daily  dextrose 5%. 1000 milliLiter(s) (50 mL/Hr) IV Continuous <Continuous>  dextrose 50% Injectable 12.5 Gram(s) IV Push once  dextrose 50% Injectable 25 Gram(s) IV Push once  dextrose 50% Injectable 25 Gram(s) IV Push once  heparin  Injectable 5000 Unit(s) SubCutaneous every 8 hours  hydrALAZINE 50 milliGRAM(s) Oral every 8 hours  hydroxychloroquine   Oral   hydroxychloroquine 200 milliGRAM(s) Oral every 12 hours  insulin lispro (HumaLOG) corrective regimen sliding scale   SubCutaneous three times a day before meals  metoprolol tartrate 25 milliGRAM(s) Oral two times a day  NIFEdipine XL 30 milliGRAM(s) Oral daily  sodium chloride 0.45%. 1000 milliLiter(s) (75 mL/Hr) IV Continuous <Continuous>    PRN Meds:  acetaminophen   Tablet .. 650 milliGRAM(s) Oral every 6 hours PRN Temp greater or equal to 38C (100.4F)  acetaminophen   Tablet .. 650 milliGRAM(s) Oral every 6 hours PRN Temp greater or equal to 38C (100.4F), Moderate Pain (4 - 6)  dextrose 40% Gel 15 Gram(s) Oral once PRN Blood Glucose LESS THAN 70 milliGRAM(s)/deciliter  glucagon  Injectable 1 milliGRAM(s) IntraMuscular once PRN Glucose LESS THAN 70 milligrams/deciliter  haloperidol    Concentrate 0.5 milliGRAM(s) Oral every 6 hours PRN agitation  morphine Concentrate 10 milliGRAM(s) Oral every 4 hours PRN respiratory distress    HOME MEDICATIONS:  aspirin 81 mg oral tablet: 1 tab(s) orally once a day  atorvastatin 40 mg oral tablet: 1 tab(s) orally once a day  ferrous sulfate 325 mg (65 mg elemental iron) oral tablet: 1 tab(s) orally once a day  hydrALAZINE 25 mg oral tablet: 3 tab(s) orally 2 times a day  Lasix 20 mg oral tablet: 1 tab(s) orally once a day  Metoprolol Tartrate 25 mg oral tablet: 1 tab(s) orally 2 times a day  NIFEdipine 30 mg oral tablet, extended release: 1 tab(s) orally once a day  Plavix 75 mg oral tablet: 1 tab(s) orally once a day  Tylenol 325 mg oral tablet: 2 tab(s) orally every 6 hours, As Needed  Vitamin D3 2000 intl units (50 mcg) oral tablet: 1 tab(s) orally once a day    Vital Signs:   T(F): 95.9 (04-10-20 @ 10:00), Max: 98.9 (04-09-20 @ 21:03)  HR: 72 (04-10-20 @ 10:00) (70 - 93)  BP: 148/85 (04-10-20 @ 10:00) (140/84 - 165/90)  RR: 20 (04-10-20 @ 10:00) (18 - 22)  SpO2: 91% on 15L NRB (04-10-20 @ 01:04) (91% - 94%)      04-09-20 @ 07:01  -  04-10-20 @ 07:00  --------------------------------------------------------  IN: 825 mL / OUT: 1 mL / NET: 824 mL    Physical Exam:   GENERAL: NAD, on NRB  HEENT: NCAT  CHEST/LUNG: CTAB  HEART: Regular rate and rhythm. No murmurs, rubs, or gallops  ABDOMEN: soft, non-tender, non-distended  EXTREMITIES: No LE edema b/l  NERVOUS SYSTEM: Alert and oriented x 0    Labs:                         13.6   6.63  )-----------( 208      ( 09 Apr 2020 12:29 )             40.5     Neutophil% 90.0, Lymphocyte% 5.9, Monocyte% 2.3, Bands% 1.4 04-09-20 @ 12:29    09 Apr 2020 12:29    139    |  93     |  123    ----------------------------<  67     3.7     |  21     |  8.7      Ca    8.6        09 Apr 2020 12:29    TPro  7.3    /  Alb  2.8    /  TBili  0.3    /  DBili  x      /  AST  34     /  ALT  24     /  AlkPhos  71     09 Apr 2020 12:29      Troponin --, CKMB 13.1,  04-07-20 @ 11:28    COVID-19 PCR: Detected (04-06)  Procalcitonin, Serum: 1.68 ng/mL (04-08)  C-Reactive Protein, Serum: 16.37 mg/dL [0.00 - 0.40] (04-08)  D-Dimer Assay, Quantitative: 2415 ng/mL DDU [0 - 230] (04-08)  Lactate Dehydrogenase, Serum: 703 [50 - 242] (04-07)    Radiology:   < from: Xray Chest 1 View-PORTABLE IMMEDIATE (04.08.20 @ 09:39) >  Impression:      Stable right greater than left airspace opacifications.    < end of copied text >      Assessment and Plan:   Patient is a 69y old  Female who presents with a chief complaint of Sent from NH for evaluation for non productive cough and abnormal labs, subsequently diagnosed with COVID-19 pneumonia.    #Acute hypoxic respiratory failure 2/2 to COVID-19 pneumonia:  -COVID-19 PCR: Detected (04-06)  -SpO2: 91% (04-10 @ 01:04) (91% - 94%)  -QTc: 469 on admission  -continue with plaquenil (4/7-4/12)  -continue with dilaudid prn for dyspnea  -appreciate palliative recs  -Procalcitonin: 1.68 (04-08)  -CRP: 16.37 (04-08)  -D-dimer: 2415 (04-08)  -LDH: 703 (04-07)  -Ferritin: -Troponin:     #WILIAM on CKD with HAGMA:   -Per nephrology: Son does not want HD  -Cr on admission 8.3, worsening to 9.3; Baseline ~3.5 per Dr. Grajeda  -continue with 1/2NS @ 100cc/hr  -Hold home dose of lasix 20mg   -Place patterson catheter. Monitor strict I/Os    #Hyperkalemia 2/2 to WILIAM: resolved    #Hypertensive urgency: well-controlled now  -BP on admission 181/107  -Will restart home dose of procardia and hydralazine.   -Can add labetalol as needed.     #T2DM - Pt not on oral agents. Monitor FS [id number] for now. Insulin sliding scale  #Afib- Unclear why pt not on AC. C/w metoprolol   #Dementia- at baseline  #HLD-c/w statin  #Unclear why pt is on plavix -could not find pt file in ER. NH staff did not know  #LUCIANO- Hb normal   _____________________________________________  #DVT ppx: heparin sq  #GI ppx: not indicated  #Diet: DASH CC soft  #Actvity: IAT  #DNR/DNI - MOLST completed 4/8    Family discussion: discussed extensively with son on 4/8 . Palliative following  DISPO: remains acute. Pending improvement in oxygen requirements and WILIAM.

## 2020-04-10 NOTE — PROGRESS NOTE ADULT - SUBJECTIVE AND OBJECTIVE BOX
69yFemale with diagnosis: HYPERKALEMIA COUGH      PHYSICAL EXAM  Pt agitated  restrained  non rebreather    T(C): , Max: 37.2 (21:03)  T(F): 95.9  HR: 72 (70 - 93)  BP: 148/85 (140/84 - 165/90)  RR: 20 (18 - 22)  SpO2: 91% (91% - 94%)              LABS:                          13.6   6.63  )-----------( 208      ( 09 Apr 2020 12:29 )             40.5                                                                                      04-09    139  |  93<L>  |  123<HH>  ----------------------------<  67<L>  3.7   |  21  |  8.7<HH>    Ca    8.6      09 Apr 2020 12:29    TPro  7.3  /  Alb  2.8<L>  /  TBili  0.3  /  DBili  x   /  AST  34  /  ALT  24  /  AlkPhos  71  04-09                                                      MEDICATIONS  (STANDING):  aspirin enteric coated 81 milliGRAM(s) Oral daily  atorvastatin 40 milliGRAM(s) Oral at bedtime  clopidogrel Tablet 75 milliGRAM(s) Oral daily  dextrose 5%. 1000 milliLiter(s) (50 mL/Hr) IV Continuous <Continuous>  dextrose 50% Injectable 12.5 Gram(s) IV Push once  dextrose 50% Injectable 25 Gram(s) IV Push once  dextrose 50% Injectable 25 Gram(s) IV Push once  heparin  Injectable 5000 Unit(s) SubCutaneous every 8 hours  hydrALAZINE 50 milliGRAM(s) Oral every 8 hours  hydroxychloroquine   Oral   hydroxychloroquine 200 milliGRAM(s) Oral every 12 hours  insulin lispro (HumaLOG) corrective regimen sliding scale   SubCutaneous three times a day before meals  metoprolol tartrate 25 milliGRAM(s) Oral two times a day  NIFEdipine XL 30 milliGRAM(s) Oral daily  sodium chloride 0.45%. 1000 milliLiter(s) (75 mL/Hr) IV Continuous <Continuous>    MEDICATIONS  (PRN):  acetaminophen   Tablet .. 650 milliGRAM(s) Oral every 6 hours PRN Temp greater or equal to 38C (100.4F)  acetaminophen   Tablet .. 650 milliGRAM(s) Oral every 6 hours PRN Temp greater or equal to 38C (100.4F), Moderate Pain (4 - 6)  dextrose 40% Gel 15 Gram(s) Oral once PRN Blood Glucose LESS THAN 70 milliGRAM(s)/deciliter  glucagon  Injectable 1 milliGRAM(s) IntraMuscular once PRN Glucose LESS THAN 70 milligrams/deciliter  HYDROmorphone  Injectable 0.5 milliGRAM(s) IV Push every 6 hours PRN dyspnea  LORazepam   Injectable 0.5 milliGRAM(s) IV Push every 4 hours PRN restlessness

## 2020-04-10 NOTE — PROGRESS NOTE ADULT - ASSESSMENT
68 yo female with a pmh od CKD (stage 5 per son  kidneys at "7-8 %" no plan for Hd" , HTN, and dementia present for elevated BUN/creatine.     # CKD 4-5 creat stable/ could not get IVF yesterday / try to get midline and resume IVF 1/2 NS at 100 cc per hour  Case discussed with son Lauro Johnson  over the phone and advised against starting RRT giving mother comorbidities and PMH and to continue medical management / Son agreeable to plan / will not do HD   repeat BMP please   strict I and O   check UA   HyperK noted, better   Bp better controlled today / keep current   will follow labs over the weekend

## 2020-04-10 NOTE — PROGRESS NOTE ADULT - SUBJECTIVE AND OBJECTIVE BOX
Nephrology progress note    THIS IS AN INCOMPLETE NOTE . FULL NOTE TO FOLLOW SHORTLY    Patient is seen and examined, events over the last 24 h noted .    Allergies:  No Known Allergies    Hospital Medications:   MEDICATIONS  (STANDING):  aspirin enteric coated 81 milliGRAM(s) Oral daily  atorvastatin 40 milliGRAM(s) Oral at bedtime  clopidogrel Tablet 75 milliGRAM(s) Oral daily  dextrose 5%. 1000 milliLiter(s) (50 mL/Hr) IV Continuous <Continuous>  dextrose 50% Injectable 12.5 Gram(s) IV Push once  dextrose 50% Injectable 25 Gram(s) IV Push once  dextrose 50% Injectable 25 Gram(s) IV Push once  heparin  Injectable 5000 Unit(s) SubCutaneous every 8 hours  hydrALAZINE 50 milliGRAM(s) Oral every 8 hours  hydroxychloroquine   Oral   hydroxychloroquine 200 milliGRAM(s) Oral every 12 hours  insulin lispro (HumaLOG) corrective regimen sliding scale   SubCutaneous three times a day before meals  metoprolol tartrate 25 milliGRAM(s) Oral two times a day  NIFEdipine XL 30 milliGRAM(s) Oral daily  sodium chloride 0.45%. 1000 milliLiter(s) (75 mL/Hr) IV Continuous <Continuous>        VITALS:  T(F): 95.1 (04-10-20 @ 08:00), Max: 98.9 (04-09-20 @ 21:03)  HR: 70 (04-10-20 @ 08:00)  BP: 154/94 (04-10-20 @ 08:00)  RR: 20 (04-10-20 @ 08:00)  SpO2: 91% (04-10-20 @ 01:04)  Wt(kg): --    04-08 @ 07:01  -  04-09 @ 07:00  --------------------------------------------------------  IN: 1050 mL / OUT: 0 mL / NET: 1050 mL    04-09 @ 07:01  -  04-10 @ 07:00  --------------------------------------------------------  IN: 825 mL / OUT: 1 mL / NET: 824 mL          PHYSICAL EXAM:  Constitutional: NAD  HEENT: anicteric sclera, oropharynx clear, MMM  Neck: No JVD  Respiratory: CTAB, no wheezes, rales or rhonchi  Cardiovascular: S1, S2, RRR  Gastrointestinal: BS+, soft, NT/ND  Extremities: No cyanosis or clubbing. No peripheral edema  :  No patterson.   Skin: No rashes    LABS:  04-09    139  |  93<L>  |  123<HH>  ----------------------------<  67<L>  3.7   |  21  |  8.7<HH>    Ca    8.6      09 Apr 2020 12:29    TPro  7.3  /  Alb  2.8<L>  /  TBili  0.3  /  DBili      /  AST  34  /  ALT  24  /  AlkPhos  71  04-09                          13.6   6.63  )-----------( 208      ( 09 Apr 2020 12:29 )             40.5       Urine Studies:      RADIOLOGY & ADDITIONAL STUDIES: Nephrology progress note  Patient is seen and examined, events over the last 24 h noted .  lying in bed lethargic with SOB     Allergies:  No Known Allergies    Hospital Medications:   MEDICATIONS  (STANDING):  aspirin enteric coated 81 milliGRAM(s) Oral daily  atorvastatin 40 milliGRAM(s) Oral at bedtime  clopidogrel Tablet 75 milliGRAM(s) Oral daily  dextrose 5%. 1000 milliLiter(s) (50 mL/Hr) IV Continuous <Continuous>  heparin  Injectable 5000 Unit(s) SubCutaneous every 8 hours  hydrALAZINE 50 milliGRAM(s) Oral every 8 hours  hydroxychloroquine 200 milliGRAM(s) Oral every 12 hours  insulin lispro (HumaLOG) corrective regimen sliding scale   SubCutaneous three times a day before meals  metoprolol tartrate 25 milliGRAM(s) Oral two times a day  NIFEdipine XL 30 milliGRAM(s) Oral daily  sodium chloride 0.45%. 1000 milliLiter(s) (75 mL/Hr) IV Continuous <Continuous>        VITALS:  T(F): 95.1 (04-10-20 @ 08:00), Max: 98.9 (04-09-20 @ 21:03)  HR: 70 (04-10-20 @ 08:00)  BP: 154/94 (04-10-20 @ 08:00)  RR: 20 (04-10-20 @ 08:00)  SpO2: 91% (04-10-20 @ 01:04)      04-08 @ 07:01  -  04-09 @ 07:00  --------------------------------------------------------  IN: 1050 mL / OUT: 0 mL / NET: 1050 mL    04-09 @ 07:01  -  04-10 @ 07:00  --------------------------------------------------------  IN: 825 mL / OUT: 1 mL / NET: 824 mL          PHYSICAL EXAM:  Constitutional: lethargic confused   HEENT: anicteric sclera, oropharynx clear, MMM  Neck: No JVD  Respiratory: CTAB, no wheezes, rales or rhonchi  Cardiovascular: S1, S2, RRR  Gastrointestinal: BS+, soft, NT/ND  Extremities: No cyanosis or clubbing. No peripheral edema  :  No patterson.   Skin: No rashes    LABS:  04-09    139  |  93<L>  |  123<HH>  ----------------------------<  67<L>  3.7   |  21  |  8.7<HH>  Creatinine Trend: 8.7<--, 9.3<--, 8.8<--, 8.3<--  Ca    8.6      09 Apr 2020 12:29    TPro  7.3  /  Alb  2.8<L>  /  TBili  0.3  /  DBili      /  AST  34  /  ALT  24  /  AlkPhos  71  04-09                          13.6   6.63  )-----------( 208      ( 09 Apr 2020 12:29 )             40.5       Urine Studies:      RADIOLOGY & ADDITIONAL STUDIES:

## 2020-04-11 LAB
ALBUMIN SERPL ELPH-MCNC: 2.3 G/DL — LOW (ref 3.5–5.2)
ALP SERPL-CCNC: 72 U/L — SIGNIFICANT CHANGE UP (ref 30–115)
ALT FLD-CCNC: 22 U/L — SIGNIFICANT CHANGE UP (ref 0–41)
ANION GAP SERPL CALC-SCNC: 24 MMOL/L — HIGH (ref 7–14)
AST SERPL-CCNC: 30 U/L — SIGNIFICANT CHANGE UP (ref 0–41)
BASOPHILS # BLD AUTO: 0.01 K/UL — SIGNIFICANT CHANGE UP (ref 0–0.2)
BASOPHILS NFR BLD AUTO: 0.2 % — SIGNIFICANT CHANGE UP (ref 0–1)
BILIRUB SERPL-MCNC: 0.2 MG/DL — SIGNIFICANT CHANGE UP (ref 0.2–1.2)
BUN SERPL-MCNC: 127 MG/DL — CRITICAL HIGH (ref 10–20)
CALCIUM SERPL-MCNC: 8.2 MG/DL — LOW (ref 8.5–10.1)
CHLORIDE SERPL-SCNC: 93 MMOL/L — LOW (ref 98–110)
CO2 SERPL-SCNC: 20 MMOL/L — SIGNIFICANT CHANGE UP (ref 17–32)
CREAT SERPL-MCNC: 9.3 MG/DL — CRITICAL HIGH (ref 0.7–1.5)
CRP SERPL-MCNC: 20.55 MG/DL — HIGH (ref 0–0.4)
EOSINOPHIL # BLD AUTO: 0.01 K/UL — SIGNIFICANT CHANGE UP (ref 0–0.7)
EOSINOPHIL NFR BLD AUTO: 0.2 % — SIGNIFICANT CHANGE UP (ref 0–8)
GLUCOSE BLDC GLUCOMTR-MCNC: 64 MG/DL — LOW (ref 70–99)
GLUCOSE BLDC GLUCOMTR-MCNC: 69 MG/DL — LOW (ref 70–99)
GLUCOSE BLDC GLUCOMTR-MCNC: 73 MG/DL — SIGNIFICANT CHANGE UP (ref 70–99)
GLUCOSE BLDC GLUCOMTR-MCNC: 80 MG/DL — SIGNIFICANT CHANGE UP (ref 70–99)
GLUCOSE SERPL-MCNC: 80 MG/DL — SIGNIFICANT CHANGE UP (ref 70–99)
HCT VFR BLD CALC: 36 % — LOW (ref 37–47)
HGB BLD-MCNC: 11.9 G/DL — LOW (ref 12–16)
IMM GRANULOCYTES NFR BLD AUTO: 1.4 % — HIGH (ref 0.1–0.3)
LYMPHOCYTES # BLD AUTO: 0.29 K/UL — LOW (ref 1.2–3.4)
LYMPHOCYTES # BLD AUTO: 4.6 % — LOW (ref 20.5–51.1)
MAGNESIUM SERPL-MCNC: 2.2 MG/DL — SIGNIFICANT CHANGE UP (ref 1.8–2.4)
MCHC RBC-ENTMCNC: 31.6 PG — HIGH (ref 27–31)
MCHC RBC-ENTMCNC: 33.1 G/DL — SIGNIFICANT CHANGE UP (ref 32–37)
MCV RBC AUTO: 95.5 FL — SIGNIFICANT CHANGE UP (ref 81–99)
MONOCYTES # BLD AUTO: 0.16 K/UL — SIGNIFICANT CHANGE UP (ref 0.1–0.6)
MONOCYTES NFR BLD AUTO: 2.5 % — SIGNIFICANT CHANGE UP (ref 1.7–9.3)
NEUTROPHILS # BLD AUTO: 5.73 K/UL — SIGNIFICANT CHANGE UP (ref 1.4–6.5)
NEUTROPHILS NFR BLD AUTO: 91.1 % — HIGH (ref 42.2–75.2)
NRBC # BLD: 0 /100 WBCS — SIGNIFICANT CHANGE UP (ref 0–0)
PLATELET # BLD AUTO: 200 K/UL — SIGNIFICANT CHANGE UP (ref 130–400)
POTASSIUM SERPL-MCNC: 4.3 MMOL/L — SIGNIFICANT CHANGE UP (ref 3.5–5)
POTASSIUM SERPL-SCNC: 4.3 MMOL/L — SIGNIFICANT CHANGE UP (ref 3.5–5)
PROCALCITONIN SERPL-MCNC: 2.25 NG/ML — HIGH (ref 0.02–0.1)
PROT SERPL-MCNC: 6.6 G/DL — SIGNIFICANT CHANGE UP (ref 6–8)
RBC # BLD: 3.77 M/UL — LOW (ref 4.2–5.4)
RBC # FLD: 13.2 % — SIGNIFICANT CHANGE UP (ref 11.5–14.5)
SODIUM SERPL-SCNC: 137 MMOL/L — SIGNIFICANT CHANGE UP (ref 135–146)
WBC # BLD: 6.29 K/UL — SIGNIFICANT CHANGE UP (ref 4.8–10.8)
WBC # FLD AUTO: 6.29 K/UL — SIGNIFICANT CHANGE UP (ref 4.8–10.8)

## 2020-04-11 RX ADMIN — Medication 50 MILLIGRAM(S): at 13:23

## 2020-04-11 RX ADMIN — Medication 200 MILLIGRAM(S): at 13:23

## 2020-04-11 RX ADMIN — HEPARIN SODIUM 5000 UNIT(S): 5000 INJECTION INTRAVENOUS; SUBCUTANEOUS at 06:18

## 2020-04-11 RX ADMIN — Medication 81 MILLIGRAM(S): at 12:23

## 2020-04-11 RX ADMIN — HEPARIN SODIUM 5000 UNIT(S): 5000 INJECTION INTRAVENOUS; SUBCUTANEOUS at 22:34

## 2020-04-11 RX ADMIN — Medication 25 MILLIGRAM(S): at 17:27

## 2020-04-11 RX ADMIN — CLOPIDOGREL BISULFATE 75 MILLIGRAM(S): 75 TABLET, FILM COATED ORAL at 12:23

## 2020-04-11 RX ADMIN — HEPARIN SODIUM 5000 UNIT(S): 5000 INJECTION INTRAVENOUS; SUBCUTANEOUS at 13:23

## 2020-04-11 NOTE — PROGRESS NOTE ADULT - SUBJECTIVE AND OBJECTIVE BOX
Hospital Day:  5d    Chief Complaint: Patient is a 69y old  Female who presents with a chief complaint of Sent from NH for evaluation for non productive cough and abnormal labs, subsequently diagnosed with COVID-19 pneumonia.    24 hour events:  -No acute events overnight  -Saturating 96% on 15L NRB    Subjective:  -Unable to obtain ROS    Past Medical Hx:   Dementia  DM (diabetes mellitus), secondary  Dyslipidemia  Anemia  Afib  Depression  HTN (hypertension)  CKD (chronic kidney disease)    Past Sx:  No significant past surgical history    Allergies:  No Known Allergies    Current Meds:   Standing Meds:  aspirin enteric coated 81 milliGRAM(s) Oral daily  atorvastatin 40 milliGRAM(s) Oral at bedtime  clopidogrel Tablet 75 milliGRAM(s) Oral daily  dextrose 5%. 1000 milliLiter(s) (50 mL/Hr) IV Continuous <Continuous>  dextrose 50% Injectable 12.5 Gram(s) IV Push once  dextrose 50% Injectable 25 Gram(s) IV Push once  dextrose 50% Injectable 25 Gram(s) IV Push once  heparin  Injectable 5000 Unit(s) SubCutaneous every 8 hours  hydrALAZINE 50 milliGRAM(s) Oral every 8 hours  hydroxychloroquine   Oral   hydroxychloroquine 200 milliGRAM(s) Oral every 12 hours  insulin lispro (HumaLOG) corrective regimen sliding scale   SubCutaneous three times a day before meals  metoprolol tartrate 25 milliGRAM(s) Oral two times a day  NIFEdipine XL 30 milliGRAM(s) Oral daily    PRN Meds:  acetaminophen   Tablet .. 650 milliGRAM(s) Oral every 6 hours PRN Temp greater or equal to 38C (100.4F)  acetaminophen   Tablet .. 650 milliGRAM(s) Oral every 6 hours PRN Temp greater or equal to 38C (100.4F), Moderate Pain (4 - 6)  dextrose 40% Gel 15 Gram(s) Oral once PRN Blood Glucose LESS THAN 70 milliGRAM(s)/deciliter  glucagon  Injectable 1 milliGRAM(s) IntraMuscular once PRN Glucose LESS THAN 70 milligrams/deciliter  haloperidol    Concentrate 0.5 milliGRAM(s) Oral every 6 hours PRN agitation  morphine Concentrate 10 milliGRAM(s) Oral every 4 hours PRN respiratory distress    HOME MEDICATIONS:  aspirin 81 mg oral tablet: 1 tab(s) orally once a day  atorvastatin 40 mg oral tablet: 1 tab(s) orally once a day  ferrous sulfate 325 mg (65 mg elemental iron) oral tablet: 1 tab(s) orally once a day  hydrALAZINE 25 mg oral tablet: 3 tab(s) orally 2 times a day  Lasix 20 mg oral tablet: 1 tab(s) orally once a day  Metoprolol Tartrate 25 mg oral tablet: 1 tab(s) orally 2 times a day  NIFEdipine 30 mg oral tablet, extended release: 1 tab(s) orally once a day  Plavix 75 mg oral tablet: 1 tab(s) orally once a day  Tylenol 325 mg oral tablet: 2 tab(s) orally every 6 hours, As Needed  Vitamin D3 2000 intl units (50 mcg) oral tablet: 1 tab(s) orally once a day      Vital Signs:   T(F): 97.9 (04-11-20 @ 08:35), Max: 99.8 (04-11-20 @ 04:30)  HR: 84 (04-11-20 @ 08:35) (67 - 88)  BP: 144/76 (04-11-20 @ 08:35) (140/104 - 158/97)  RR: 20 (04-11-20 @ 08:35) (20 - 20)  SpO2: 96% (04-11-20 @ 08:35) (95% - 96%)    Physical Exam:   GENERAL: NAD, tachypneic  HEENT: NCAT  CHEST/LUNG: CTAB  HEART: Regular rate and rhythm. No murmurs, rubs, or gallops  ABDOMEN: soft, non-tender, non-distended  EXTREMITIES: No LE edema b/l  NERVOUS SYSTEM: Lethargic, oriented x 0    Labs:                         11.9   6.29  )-----------( 200      ( 11 Apr 2020 08:20 )             36.0     Neutophil% 91.1, Lymphocyte% 4.6, Monocyte% 2.5, Bands% 1.4 04-11-20 @ 08:20    11 Apr 2020 08:20    137    |  93     |  127    ----------------------------<  80     4.3     |  20     |  9.3      Ca    8.2        11 Apr 2020 08:20  Mg     2.2       11 Apr 2020 08:20    TPro  6.6    /  Alb  2.3    /  TBili  0.2    /  DBili  x      /  AST  30     /  ALT  22     /  AlkPhos  72     11 Apr 2020 08:20    Troponin --, CKMB 13.1,  04-07-20 @ 11:28      COVID-19 PCR: Detected (04-06)    Procalcitonin, Serum: 2.25 ng/mL (04-10)  Procalcitonin, Serum: 1.68 ng/mL (04-08)    C-Reactive Protein, Serum: 20.55 mg/dL [0.00 - 0.40] (04-10)  C-Reactive Protein, Serum: 16.37 mg/dL [0.00 - 0.40] (04-08)    D-Dimer Assay, Quantitative: 3480 ng/mL DDU [0 - 230] (04-10)  D-Dimer Assay, Quantitative: 2415 ng/mL DDU [0 - 230] (04-08)      Lactate Dehydrogenase, Serum: 703 [50 - 242] (04-07)        Radiology:       Assessment and Plan:   Patient is a 69y old  Female who presents with a chief complaint of Sent from NH for evaluation for non productive cough and abnormal labs, subsequently diagnosed with COVID-19 pneumonia.    #Acute hypoxic respiratory failure 2/2 to COVID-19 pneumonia:  -COVID-19 PCR: Detected (04-06)  -SpO2: 91% (04-10 @ 01:04) (91% - 94%)  -QTc: 469 on admission  -continue with plaquenil (4/7-4/12)  -continue with dilaudid prn for dyspnea  -appreciate palliative recs  -Procalcitonin: 2.25 (04-10)  -CRP: 20.55 (04-10)  -D-dimer: 3480 (04-10)  -LDH: 703 (04-07)  -Ferritin: -Troponin:     #WILIAM on CKD with HAGMA:   -Per nephrology: Son does not want HD  -Cr on admission 8.3, worsening to 9.3; Baseline ~3.5 per Dr. Grajeda  -continue with 1/2NS @ 100cc/hr  -Hold home dose of lasix 20mg   -Place patterson catheter. Monitor strict I/Os    #Hyperkalemia 2/2 to WILIAM: resolved    #Hypertensive urgency: well-controlled now  -BP on admission 181/107  -Will restart home dose of procardia and hydralazine.   -Can add labetalol as needed.     #T2DM - Pt not on oral agents. Monitor FS [id number] for now. Insulin sliding scale  #Afib- Unclear why pt not on AC. C/w metoprolol   #Dementia- at baseline  #HLD-c/w statin  #Unclear why pt is on plavix -could not find pt file in ER. NH staff did not know  #LUCIANO- Hb normal   _____________________________________________  #DVT ppx: heparin sq  #GI ppx: not indicated  #Diet: DASH CC soft  #Actvity: IAT  #DNR/DNI - MOLST completed 4/8    Family discussion: discussed extensively with son on 4/8. Palliative following  DISPO: remains acute. Pending improvement in oxygen requirements and WILIAM.

## 2020-04-11 NOTE — PROGRESS NOTE ADULT - SUBJECTIVE AND OBJECTIVE BOX
pt seen  preceding noted  palliative care  on face mask non rebreather  prognosis poor  note to follow pt seen  preceding noted  palliative care  on face mask non rebreather  prognosis poor  note to follow  -----------  Pt had pulled out her IVs  spoke to son in detail.  Son adamant on putting back the IV   on  . Explained to him overall poor prognosis.  Po intake absent.      MEDICATIONS  (STANDING):  aspirin enteric coated 81 milliGRAM(s) Oral daily  atorvastatin 40 milliGRAM(s) Oral at bedtime  clopidogrel Tablet 75 milliGRAM(s) Oral daily  dextrose 5%. 1000 milliLiter(s) (50 mL/Hr) IV Continuous <Continuous>  dextrose 50% Injectable 12.5 Gram(s) IV Push once  dextrose 50% Injectable 25 Gram(s) IV Push once  dextrose 50% Injectable 25 Gram(s) IV Push once  heparin  Injectable 5000 Unit(s) SubCutaneous every 8 hours  hydrALAZINE 50 milliGRAM(s) Oral every 8 hours  hydroxychloroquine   Oral   hydroxychloroquine 200 milliGRAM(s) Oral every 12 hours  insulin lispro (HumaLOG) corrective regimen sliding scale   SubCutaneous three times a day before meals  metoprolol tartrate 25 milliGRAM(s) Oral two times a day  NIFEdipine XL 30 milliGRAM(s) Oral daily    MEDICATIONS  (PRN):  acetaminophen   Tablet .. 650 milliGRAM(s) Oral every 6 hours PRN Temp greater or equal to 38C (100.4F)  acetaminophen   Tablet .. 650 milliGRAM(s) Oral every 6 hours PRN Temp greater or equal to 38C (100.4F), Moderate Pain (4 - 6)  dextrose 40% Gel 15 Gram(s) Oral once PRN Blood Glucose LESS THAN 70 milliGRAM(s)/deciliter  glucagon  Injectable 1 milliGRAM(s) IntraMuscular once PRN Glucose LESS THAN 70 milligrams/deciliter  haloperidol    Concentrate 0.5 milliGRAM(s) Oral every 6 hours PRN agitation  morphine Concentrate 10 milliGRAM(s) Oral every 4 hours PRN respiratory distress      Vital Signs Last 24 Hrs  T(C): 36.6 (11 Apr 2020 08:35), Max: 37.7 (11 Apr 2020 04:30)  T(F): 97.9 (11 Apr 2020 08:35), Max: 99.8 (11 Apr 2020 04:30)  HR: 84 (11 Apr 2020 08:35) (67 - 88)  BP: 144/76 (11 Apr 2020 08:35) (140/104 - 158/97)  BP(mean): --  RR: 20 (11 Apr 2020 08:35) (20 - 20)  SpO2: 96% (11 Apr 2020 08:35) (95% - 96%)      LABS:                        11.9   6.29  )-----------( 200      ( 11 Apr 2020 08:20 )             36.0     04-11    137  |  93<L>  |  127<HH>  ----------------------------<  80  4.3   |  20  |  9.3<HH>    Ca    8.2<L>      11 Apr 2020 08:20  Mg     2.2     04-11    TPro  6.6  /  Alb  2.3<L>  /  TBili  0.2  /  DBili  x   /  AST  30  /  ALT  22  /  AlkPhos  72  04-11        Drug Screen Urine:  Alcohol Level n/a        RADIOLOGY & ADDITIONAL STUDIES:n/a

## 2020-04-11 NOTE — PROGRESS NOTE ADULT - ASSESSMENT
Renal failure  covid (+)  dementia  Hyperkalemia          Pts son aware of poor prognosis  but appears not mentally prepared for hospice.  comfort measures for now.  Restart IV for slow hydration

## 2020-04-12 LAB
ALBUMIN SERPL ELPH-MCNC: 2.6 G/DL — LOW (ref 3.5–5.2)
ALP SERPL-CCNC: 89 U/L — SIGNIFICANT CHANGE UP (ref 30–115)
ALT FLD-CCNC: 22 U/L — SIGNIFICANT CHANGE UP (ref 0–41)
ANION GAP SERPL CALC-SCNC: 28 MMOL/L — HIGH (ref 7–14)
AST SERPL-CCNC: 27 U/L — SIGNIFICANT CHANGE UP (ref 0–41)
BASOPHILS # BLD AUTO: 0.03 K/UL — SIGNIFICANT CHANGE UP (ref 0–0.2)
BASOPHILS NFR BLD AUTO: 0.6 % — SIGNIFICANT CHANGE UP (ref 0–1)
BILIRUB SERPL-MCNC: 0.3 MG/DL — SIGNIFICANT CHANGE UP (ref 0.2–1.2)
BUN SERPL-MCNC: 131 MG/DL — CRITICAL HIGH (ref 10–20)
CALCIUM SERPL-MCNC: 8.9 MG/DL — SIGNIFICANT CHANGE UP (ref 8.5–10.1)
CHLORIDE SERPL-SCNC: 95 MMOL/L — LOW (ref 98–110)
CO2 SERPL-SCNC: 21 MMOL/L — SIGNIFICANT CHANGE UP (ref 17–32)
CREAT SERPL-MCNC: 10.2 MG/DL — CRITICAL HIGH (ref 0.7–1.5)
D DIMER BLD IA.RAPID-MCNC: 5805 NG/ML DDU — HIGH (ref 0–230)
EOSINOPHIL # BLD AUTO: 0 K/UL — SIGNIFICANT CHANGE UP (ref 0–0.7)
EOSINOPHIL NFR BLD AUTO: 0 % — SIGNIFICANT CHANGE UP (ref 0–8)
GLUCOSE BLDC GLUCOMTR-MCNC: 112 MG/DL — HIGH (ref 70–99)
GLUCOSE BLDC GLUCOMTR-MCNC: 63 MG/DL — LOW (ref 70–99)
GLUCOSE BLDC GLUCOMTR-MCNC: 90 MG/DL — SIGNIFICANT CHANGE UP (ref 70–99)
GLUCOSE BLDC GLUCOMTR-MCNC: 99 MG/DL — SIGNIFICANT CHANGE UP (ref 70–99)
GLUCOSE SERPL-MCNC: 58 MG/DL — LOW (ref 70–99)
HCT VFR BLD CALC: 38.3 % — SIGNIFICANT CHANGE UP (ref 37–47)
HGB BLD-MCNC: 12.8 G/DL — SIGNIFICANT CHANGE UP (ref 12–16)
IMM GRANULOCYTES NFR BLD AUTO: 1.2 % — HIGH (ref 0.1–0.3)
LDH SERPL L TO P-CCNC: 563 — HIGH (ref 50–242)
LYMPHOCYTES # BLD AUTO: 0.45 K/UL — LOW (ref 1.2–3.4)
LYMPHOCYTES # BLD AUTO: 9.1 % — LOW (ref 20.5–51.1)
MCHC RBC-ENTMCNC: 33 PG — HIGH (ref 27–31)
MCHC RBC-ENTMCNC: 33.4 G/DL — SIGNIFICANT CHANGE UP (ref 32–37)
MCV RBC AUTO: 98.7 FL — SIGNIFICANT CHANGE UP (ref 81–99)
MONOCYTES # BLD AUTO: 0.12 K/UL — SIGNIFICANT CHANGE UP (ref 0.1–0.6)
MONOCYTES NFR BLD AUTO: 2.4 % — SIGNIFICANT CHANGE UP (ref 1.7–9.3)
NEUTROPHILS # BLD AUTO: 4.27 K/UL — SIGNIFICANT CHANGE UP (ref 1.4–6.5)
NEUTROPHILS NFR BLD AUTO: 86.7 % — HIGH (ref 42.2–75.2)
NRBC # BLD: 0 /100 WBCS — SIGNIFICANT CHANGE UP (ref 0–0)
PLATELET # BLD AUTO: 209 K/UL — SIGNIFICANT CHANGE UP (ref 130–400)
POTASSIUM SERPL-MCNC: 4.8 MMOL/L — SIGNIFICANT CHANGE UP (ref 3.5–5)
POTASSIUM SERPL-SCNC: 4.8 MMOL/L — SIGNIFICANT CHANGE UP (ref 3.5–5)
PROT SERPL-MCNC: 7.2 G/DL — SIGNIFICANT CHANGE UP (ref 6–8)
RBC # BLD: 3.88 M/UL — LOW (ref 4.2–5.4)
RBC # FLD: 13.3 % — SIGNIFICANT CHANGE UP (ref 11.5–14.5)
SODIUM SERPL-SCNC: 144 MMOL/L — SIGNIFICANT CHANGE UP (ref 135–146)
WBC # BLD: 4.93 K/UL — SIGNIFICANT CHANGE UP (ref 4.8–10.8)
WBC # FLD AUTO: 4.93 K/UL — SIGNIFICANT CHANGE UP (ref 4.8–10.8)

## 2020-04-12 RX ORDER — SODIUM CHLORIDE 9 MG/ML
1000 INJECTION, SOLUTION INTRAVENOUS
Refills: 0 | Status: DISCONTINUED | OUTPATIENT
Start: 2020-04-12 | End: 2020-04-12

## 2020-04-12 RX ORDER — SODIUM CHLORIDE 9 MG/ML
1000 INJECTION, SOLUTION INTRAVENOUS
Refills: 0 | Status: DISCONTINUED | OUTPATIENT
Start: 2020-04-12 | End: 2020-04-14

## 2020-04-12 RX ORDER — SODIUM CHLORIDE 9 MG/ML
500 INJECTION INTRAMUSCULAR; INTRAVENOUS; SUBCUTANEOUS ONCE
Refills: 0 | Status: COMPLETED | OUTPATIENT
Start: 2020-04-12 | End: 2020-04-12

## 2020-04-12 RX ADMIN — Medication 50 MILLIGRAM(S): at 07:31

## 2020-04-12 RX ADMIN — HEPARIN SODIUM 5000 UNIT(S): 5000 INJECTION INTRAVENOUS; SUBCUTANEOUS at 22:43

## 2020-04-12 RX ADMIN — MORPHINE SULFATE 10 MILLIGRAM(S): 50 CAPSULE, EXTENDED RELEASE ORAL at 21:12

## 2020-04-12 RX ADMIN — Medication 81 MILLIGRAM(S): at 11:59

## 2020-04-12 RX ADMIN — CLOPIDOGREL BISULFATE 75 MILLIGRAM(S): 75 TABLET, FILM COATED ORAL at 11:59

## 2020-04-12 RX ADMIN — Medication 25 MILLIGRAM(S): at 07:31

## 2020-04-12 RX ADMIN — HEPARIN SODIUM 5000 UNIT(S): 5000 INJECTION INTRAVENOUS; SUBCUTANEOUS at 06:24

## 2020-04-12 RX ADMIN — SODIUM CHLORIDE 2000 MILLILITER(S): 9 INJECTION INTRAMUSCULAR; INTRAVENOUS; SUBCUTANEOUS at 20:30

## 2020-04-12 RX ADMIN — HEPARIN SODIUM 5000 UNIT(S): 5000 INJECTION INTRAVENOUS; SUBCUTANEOUS at 12:30

## 2020-04-12 RX ADMIN — Medication 25 MILLIGRAM(S): at 17:10

## 2020-04-12 RX ADMIN — Medication 30 MILLIGRAM(S): at 07:32

## 2020-04-12 NOTE — PROGRESS NOTE ADULT - ASSESSMENT
68 yo female with a pmh od CKD (stage 5 per son  kidneys at "7-8 %" no plan for Hd" , HTN, and dementia present for elevated BUN/creatine.     ckd 5 pt. labs acceptable. no pulmonary issues. will manage conservatively at this point.    seen with fellow.

## 2020-04-12 NOTE — PROGRESS NOTE ADULT - SUBJECTIVE AND OBJECTIVE BOX
Nephrology progress note    Patient is seen and examined, events over the last 24 h noted .    Allergies:  No Known Allergies    Hospital Medications:   MEDICATIONS  (STANDING):  aspirin enteric coated 81 milliGRAM(s) Oral daily  atorvastatin 40 milliGRAM(s) Oral at bedtime  clopidogrel Tablet 75 milliGRAM(s) Oral daily  dextrose 5%. 1000 milliLiter(s) (50 mL/Hr) IV Continuous <Continuous>  dextrose 50% Injectable 12.5 Gram(s) IV Push once  dextrose 50% Injectable 25 Gram(s) IV Push once  dextrose 50% Injectable 25 Gram(s) IV Push once  heparin  Injectable 5000 Unit(s) SubCutaneous every 8 hours  hydrALAZINE 50 milliGRAM(s) Oral every 8 hours  insulin lispro (HumaLOG) corrective regimen sliding scale   SubCutaneous three times a day before meals  metoprolol tartrate 25 milliGRAM(s) Oral two times a day  NIFEdipine XL 30 milliGRAM(s) Oral daily        VITALS:  T(F): 99 (04-12-20 @ 05:26), Max: 99 (04-12-20 @ 05:26)  HR: 99 (04-12-20 @ 05:26)  BP: 181/96 (04-12-20 @ 05:26)  RR: 18 (04-12-20 @ 05:26)  SpO2: 97% (04-12-20 @ 01:00)  Wt(kg): --    04-11 @ 07:01  -  04-12 @ 07:00  --------------------------------------------------------  IN: 360 mL / OUT: 400 mL / NET: -40 mL          PHYSICAL EXAM:  Constitutional: NAD  HEENT: anicteric sclera, oropharynx clear, MMM  Neck: No JVD  Respiratory: CTAB, no wheezes, rales or rhonchi  Cardiovascular: S1, S2, RRR  Gastrointestinal: BS+, soft, NT/ND  Extremities: No cyanosis or clubbing. No peripheral edema  :  No patterson.   Skin: No rashes    LABS:  04-11    137  |  93<L>  |  127<HH>  ----------------------------<  80  4.3   |  20  |  9.3<HH>    Ca    8.2<L>      11 Apr 2020 08:20  Mg     2.2     04-11    TPro  6.6  /  Alb  2.3<L>  /  TBili  0.2  /  DBili      /  AST  30  /  ALT  22  /  AlkPhos  72  04-11                          12.8   4.93  )-----------( 209      ( 12 Apr 2020 06:00 )             38.3       Urine Studies:      RADIOLOGY & ADDITIONAL STUDIES:

## 2020-04-12 NOTE — PROGRESS NOTE ADULT - SUBJECTIVE AND OBJECTIVE BOX
Preceding events noted  Overall no change  remains on non breather 100%  lethargic  no major interventions  IV fluids slow hydration  PO intake absent/minimal  Prognosis remains poor  last labs acceptable  CKD

## 2020-04-13 LAB
ALBUMIN SERPL ELPH-MCNC: 2.2 G/DL — LOW (ref 3.5–5.2)
ALP SERPL-CCNC: 76 U/L — SIGNIFICANT CHANGE UP (ref 30–115)
ALT FLD-CCNC: 24 U/L — SIGNIFICANT CHANGE UP (ref 0–41)
ANION GAP SERPL CALC-SCNC: 27 MMOL/L — HIGH (ref 7–14)
AST SERPL-CCNC: 40 U/L — SIGNIFICANT CHANGE UP (ref 0–41)
BASOPHILS # BLD AUTO: 0.02 K/UL — SIGNIFICANT CHANGE UP (ref 0–0.2)
BASOPHILS NFR BLD AUTO: 0.7 % — SIGNIFICANT CHANGE UP (ref 0–1)
BILIRUB SERPL-MCNC: 0.2 MG/DL — SIGNIFICANT CHANGE UP (ref 0.2–1.2)
BUN SERPL-MCNC: 137 MG/DL — CRITICAL HIGH (ref 10–20)
CALCIUM SERPL-MCNC: 8.5 MG/DL — SIGNIFICANT CHANGE UP (ref 8.5–10.1)
CHLORIDE SERPL-SCNC: 95 MMOL/L — LOW (ref 98–110)
CO2 SERPL-SCNC: 18 MMOL/L — SIGNIFICANT CHANGE UP (ref 17–32)
CREAT SERPL-MCNC: 10.5 MG/DL — CRITICAL HIGH (ref 0.7–1.5)
CRP SERPL-MCNC: 33.2 MG/DL — HIGH (ref 0–0.4)
EOSINOPHIL # BLD AUTO: 0 K/UL — SIGNIFICANT CHANGE UP (ref 0–0.7)
EOSINOPHIL NFR BLD AUTO: 0 % — SIGNIFICANT CHANGE UP (ref 0–8)
GLUCOSE BLDC GLUCOMTR-MCNC: 94 MG/DL — SIGNIFICANT CHANGE UP (ref 70–99)
GLUCOSE SERPL-MCNC: 129 MG/DL — HIGH (ref 70–99)
HCT VFR BLD CALC: 35 % — LOW (ref 37–47)
HGB BLD-MCNC: 11.8 G/DL — LOW (ref 12–16)
IMM GRANULOCYTES NFR BLD AUTO: 1.6 % — HIGH (ref 0.1–0.3)
LYMPHOCYTES # BLD AUTO: 0.07 K/UL — LOW (ref 1.2–3.4)
LYMPHOCYTES # BLD AUTO: 2.3 % — LOW (ref 20.5–51.1)
MCHC RBC-ENTMCNC: 32.6 PG — HIGH (ref 27–31)
MCHC RBC-ENTMCNC: 33.7 G/DL — SIGNIFICANT CHANGE UP (ref 32–37)
MCV RBC AUTO: 96.7 FL — SIGNIFICANT CHANGE UP (ref 81–99)
MONOCYTES # BLD AUTO: 0.15 K/UL — SIGNIFICANT CHANGE UP (ref 0.1–0.6)
MONOCYTES NFR BLD AUTO: 4.9 % — SIGNIFICANT CHANGE UP (ref 1.7–9.3)
NEUTROPHILS # BLD AUTO: 2.78 K/UL — SIGNIFICANT CHANGE UP (ref 1.4–6.5)
NEUTROPHILS NFR BLD AUTO: 90.5 % — HIGH (ref 42.2–75.2)
NRBC # BLD: 0 /100 WBCS — SIGNIFICANT CHANGE UP (ref 0–0)
PLATELET # BLD AUTO: 187 K/UL — SIGNIFICANT CHANGE UP (ref 130–400)
POTASSIUM SERPL-MCNC: 4.1 MMOL/L — SIGNIFICANT CHANGE UP (ref 3.5–5)
POTASSIUM SERPL-SCNC: 4.1 MMOL/L — SIGNIFICANT CHANGE UP (ref 3.5–5)
PROCALCITONIN SERPL-MCNC: 3.42 NG/ML — HIGH (ref 0.02–0.1)
PROT SERPL-MCNC: 5.8 G/DL — LOW (ref 6–8)
RBC # BLD: 3.62 M/UL — LOW (ref 4.2–5.4)
RBC # FLD: 13.4 % — SIGNIFICANT CHANGE UP (ref 11.5–14.5)
SODIUM SERPL-SCNC: 140 MMOL/L — SIGNIFICANT CHANGE UP (ref 135–146)
WBC # BLD: 3.07 K/UL — LOW (ref 4.8–10.8)
WBC # FLD AUTO: 3.07 K/UL — LOW (ref 4.8–10.8)

## 2020-04-13 PROCEDURE — 93010 ELECTROCARDIOGRAM REPORT: CPT

## 2020-04-13 RX ORDER — METOPROLOL TARTRATE 50 MG
5 TABLET ORAL ONCE
Refills: 0 | Status: COMPLETED | OUTPATIENT
Start: 2020-04-13 | End: 2020-04-13

## 2020-04-13 RX ADMIN — Medication 0.5 MILLIGRAM(S): at 18:55

## 2020-04-13 RX ADMIN — Medication 5 MILLIGRAM(S): at 04:59

## 2020-04-13 RX ADMIN — HEPARIN SODIUM 5000 UNIT(S): 5000 INJECTION INTRAVENOUS; SUBCUTANEOUS at 14:03

## 2020-04-13 RX ADMIN — HEPARIN SODIUM 5000 UNIT(S): 5000 INJECTION INTRAVENOUS; SUBCUTANEOUS at 06:33

## 2020-04-13 RX ADMIN — HEPARIN SODIUM 5000 UNIT(S): 5000 INJECTION INTRAVENOUS; SUBCUTANEOUS at 22:37

## 2020-04-13 RX ADMIN — MORPHINE SULFATE 10 MILLIGRAM(S): 50 CAPSULE, EXTENDED RELEASE ORAL at 10:48

## 2020-04-13 RX ADMIN — MORPHINE SULFATE 10 MILLIGRAM(S): 50 CAPSULE, EXTENDED RELEASE ORAL at 16:08

## 2020-04-13 RX ADMIN — MORPHINE SULFATE 10 MILLIGRAM(S): 50 CAPSULE, EXTENDED RELEASE ORAL at 03:22

## 2020-04-13 NOTE — PROGRESS NOTE ADULT - REASON FOR ADMISSION
Sent from NH for evaluation for non productive cough and abnormal labs

## 2020-04-13 NOTE — PROGRESS NOTE ADULT - ATTENDING COMMENTS
Agree with above  oxygenation still poor although a little better  No PO intake as far as nutrition  Afebrile  Dementia  Palliative care on case  comfort measures  DNR/DNI
Preceding noted  seen earlier in the morning  No change from yesterday  Spoke to Nursing  deteriorating   no po intake  ESRD  hypoxic  spoke to son in detail yesterday  DNR/DNI  but adamant on some IV fluids to continue  Hospice candidate but son hard to get thru.  For time being comfort measure  terminal situation
Above reviewed  spoke with resident in detail  pt terminal status, with resp distress, hypoxia and advanced kidney failure  covid (+)  Son has agree to DNR/DNI status  will have palliative care on case as well
Preceding noted  agree with house staff   palliation is goal/comfort measures  On 100% O2  Renal failure, Covid infection  at this point may stop daily blood draws  comfort measures

## 2020-04-13 NOTE — PROGRESS NOTE ADULT - SUBJECTIVE AND OBJECTIVE BOX
Hospital Day:  7d    Subjective: Patient is a 69y old  Female who presents with a chief complaint of Sent from NH for evaluation for non productive cough and abnormal labs (12 Apr 2020 14:12)    Pt was reported to be desating over the night, Currently on 100 NRB sating 60-80s.   Pt not following commands.   Past Medical Hx:   Dementia  DM (diabetes mellitus), secondary  Dyslipidemia  Anemia  Afib  Depression  HTN (hypertension)  CKD (chronic kidney disease)    Past Sx:  No significant past surgical history    Allergies:  No Known Allergies    Current Meds:   Standng Meds:  aspirin enteric coated 81 milliGRAM(s) Oral daily  atorvastatin 40 milliGRAM(s) Oral at bedtime  clopidogrel Tablet 75 milliGRAM(s) Oral daily  dextrose 5%. 1000 milliLiter(s) (50 mL/Hr) IV Continuous <Continuous>  dextrose 5%. 1000 milliLiter(s) (75 mL/Hr) IV Continuous <Continuous>  dextrose 50% Injectable 12.5 Gram(s) IV Push once  dextrose 50% Injectable 25 Gram(s) IV Push once  dextrose 50% Injectable 25 Gram(s) IV Push once  heparin  Injectable 5000 Unit(s) SubCutaneous every 8 hours  hydrALAZINE 50 milliGRAM(s) Oral every 8 hours  insulin lispro (HumaLOG) corrective regimen sliding scale   SubCutaneous three times a day before meals  metoprolol tartrate 25 milliGRAM(s) Oral two times a day  NIFEdipine XL 30 milliGRAM(s) Oral daily    PRN Meds:  acetaminophen   Tablet .. 650 milliGRAM(s) Oral every 6 hours PRN Temp greater or equal to 38C (100.4F)  acetaminophen   Tablet .. 650 milliGRAM(s) Oral every 6 hours PRN Temp greater or equal to 38C (100.4F), Moderate Pain (4 - 6)  dextrose 40% Gel 15 Gram(s) Oral once PRN Blood Glucose LESS THAN 70 milliGRAM(s)/deciliter  glucagon  Injectable 1 milliGRAM(s) IntraMuscular once PRN Glucose LESS THAN 70 milligrams/deciliter  haloperidol    Concentrate 0.5 milliGRAM(s) Oral every 6 hours PRN agitation  morphine Concentrate 10 milliGRAM(s) Oral every 4 hours PRN respiratory distress    HOME MEDICATIONS:  aspirin 81 mg oral tablet: 1 tab(s) orally once a day  atorvastatin 40 mg oral tablet: 1 tab(s) orally once a day  ferrous sulfate 325 mg (65 mg elemental iron) oral tablet: 1 tab(s) orally once a day  hydrALAZINE 25 mg oral tablet: 3 tab(s) orally 2 times a day  Lasix 20 mg oral tablet: 1 tab(s) orally once a day  Metoprolol Tartrate 25 mg oral tablet: 1 tab(s) orally 2 times a day  NIFEdipine 30 mg oral tablet, extended release: 1 tab(s) orally once a day  Plavix 75 mg oral tablet: 1 tab(s) orally once a day  Tylenol 325 mg oral tablet: 2 tab(s) orally every 6 hours, As Needed  Vitamin D3 2000 intl units (50 mcg) oral tablet: 1 tab(s) orally once a day      Vital Signs:   T(F): 95.6 (04-13-20 @ 12:04), Max: 97.7 (04-12-20 @ 14:44)  HR: 84 (04-13-20 @ 12:04) (77 - 135)  BP: 105/65 (04-13-20 @ 12:04) (80/67 - 140/66)  RR: 20 (04-13-20 @ 12:04) (16 - 24)  SpO2: 61% (04-13-20 @ 13:54) (60% - 95%)      04-12-20 @ 07:01  -  04-13-20 @ 07:00  --------------------------------------------------------  IN: 100 mL / OUT: 400 mL / NET: -300 mL        Physical Exam: Limited PE  GENERAL: not following commands  HEENT: NCAT  CHEST/LUNG: in respiratory distress. 100% NRB      Labs:                         11.8   3.07  )-----------( 187      ( 13 Apr 2020 08:02 )             35.0     Neutophil% 90.5, Lymphocyte% 2.3, Monocyte% 4.9, Bands% 1.6 04-13-20 @ 08:02    13 Apr 2020 08:02    140    |  95     |  137    ----------------------------<  129    4.1     |  18     |  10.5     Ca    8.5        13 Apr 2020 08:02    TPro  5.8    /  Alb  2.2    /  TBili  0.2    /  DBili  x      /  AST  40     /  ALT  24     /  AlkPhos  76     13 Apr 2020 08:02    Radiology:       Assessment and Plan:   Patient is a 69y old  Female who presents with a chief complaint of Sent from NH for evaluation for non productive cough and abnormal labs, subsequently diagnosed with COVID-19 pneumonia.    #Acute hypoxic respiratory failure 2/2 to COVID-19 pneumonia:  -COVID-19 PCR: Detected (04-06)  -QTc: 469 on admission  -continue with plaquenil (4/7-4/12)  -appreciate palliative recs: Roxanol 10mg q4h for respiratory distress prn and Haldol 0.5mg q6h prn for agitation  -Procalcitonin: 3.42 (04-12)  -CRP: 20.55 (04-10)    #WILIAM on CKD with HAGMA:   -Per nephrology: Son does not want HD  - Baseline creat ~3.5 per Dr. Grajeda, worsening creat 10.5  - Hold home dose of lasix 20mg   -Place patterson catheter. Monitor strict I/Os    #Hyperkalemia 2/2 to WILIAM: resolved    #Hypertensive urgency: well-controlled now  -BP on admission 181/107  -restarted home dose of procardia and hydralazine.   -Can add labetalol as needed.     #T2DM - Pt not on oral agents. Monitor FS [id number] for now. Insulin sliding scale  #Afib- Unclear why pt not on AC. C/w metoprolol   #Dementia- at baseline  #HLD-c/w statin  #Unclear why pt is on plavix -could not find pt file in ER. NH staff did not know  #LUCIANO- Hb normal   _____________________________________________  #DVT ppx: heparin sq  #GI ppx: not indicated  #Diet: DASH CC soft  #Actvity: IAT  # poor prognosis    DNR/DNI  no dialysis  no tube feeding  for comfort

## 2020-04-14 VITALS
SYSTOLIC BLOOD PRESSURE: 89 MMHG | HEART RATE: 95 BPM | WEIGHT: 106.92 LBS | TEMPERATURE: 100 F | DIASTOLIC BLOOD PRESSURE: 50 MMHG

## 2020-04-14 LAB
ANION GAP SERPL CALC-SCNC: 26 MMOL/L — HIGH (ref 7–14)
ANISOCYTOSIS BLD QL: SLIGHT — SIGNIFICANT CHANGE UP
BASOPHILS # BLD AUTO: 0 K/UL — SIGNIFICANT CHANGE UP (ref 0–0.2)
BASOPHILS NFR BLD AUTO: 0 % — SIGNIFICANT CHANGE UP (ref 0–1)
BUN SERPL-MCNC: 152 MG/DL — CRITICAL HIGH (ref 10–20)
CALCIUM SERPL-MCNC: 8 MG/DL — LOW (ref 8.5–10.1)
CHLORIDE SERPL-SCNC: 95 MMOL/L — LOW (ref 98–110)
CO2 SERPL-SCNC: 20 MMOL/L — SIGNIFICANT CHANGE UP (ref 17–32)
CREAT SERPL-MCNC: 10.9 MG/DL — CRITICAL HIGH (ref 0.7–1.5)
CRP SERPL-MCNC: 31.68 MG/DL — HIGH (ref 0–0.4)
D DIMER BLD IA.RAPID-MCNC: HIGH NG/ML DDU (ref 0–230)
EOSINOPHIL # BLD AUTO: 0 K/UL — SIGNIFICANT CHANGE UP (ref 0–0.7)
EOSINOPHIL NFR BLD AUTO: 0 % — SIGNIFICANT CHANGE UP (ref 0–8)
GLUCOSE SERPL-MCNC: 49 MG/DL — LOW (ref 70–99)
HCT VFR BLD CALC: 30 % — LOW (ref 37–47)
HGB BLD-MCNC: 9.8 G/DL — LOW (ref 12–16)
LYMPHOCYTES # BLD AUTO: 0.13 K/UL — LOW (ref 1.2–3.4)
LYMPHOCYTES # BLD AUTO: 4 % — LOW (ref 20.5–51.1)
MAGNESIUM SERPL-MCNC: 2.2 MG/DL — SIGNIFICANT CHANGE UP (ref 1.8–2.4)
MANUAL SMEAR VERIFICATION: SIGNIFICANT CHANGE UP
MCHC RBC-ENTMCNC: 32.7 G/DL — SIGNIFICANT CHANGE UP (ref 32–37)
MCHC RBC-ENTMCNC: 32.7 PG — HIGH (ref 27–31)
MCV RBC AUTO: 100 FL — HIGH (ref 81–99)
MONOCYTES # BLD AUTO: 0.13 K/UL — SIGNIFICANT CHANGE UP (ref 0.1–0.6)
MONOCYTES NFR BLD AUTO: 4 % — SIGNIFICANT CHANGE UP (ref 1.7–9.3)
NEUTROPHILS # BLD AUTO: 3 K/UL — SIGNIFICANT CHANGE UP (ref 1.4–6.5)
NEUTROPHILS NFR BLD AUTO: 82 % — HIGH (ref 42.2–75.2)
NEUTS BAND # BLD: 9 % — HIGH (ref 0–6)
NRBC # BLD: 0 /100 — SIGNIFICANT CHANGE UP (ref 0–0)
NRBC # BLD: SIGNIFICANT CHANGE UP /100 WBCS (ref 0–0)
PLAT MORPH BLD: NORMAL — SIGNIFICANT CHANGE UP
PLATELET # BLD AUTO: 172 K/UL — SIGNIFICANT CHANGE UP (ref 130–400)
POTASSIUM SERPL-MCNC: 4.7 MMOL/L — SIGNIFICANT CHANGE UP (ref 3.5–5)
POTASSIUM SERPL-SCNC: 4.7 MMOL/L — SIGNIFICANT CHANGE UP (ref 3.5–5)
PROCALCITONIN SERPL-MCNC: 5.96 NG/ML — HIGH (ref 0.02–0.1)
PROCALCITONIN SERPL-MCNC: 6.8 NG/ML — HIGH (ref 0.02–0.1)
RBC # BLD: 3 M/UL — LOW (ref 4.2–5.4)
RBC # FLD: 13.6 % — SIGNIFICANT CHANGE UP (ref 11.5–14.5)
RBC BLD AUTO: NORMAL — SIGNIFICANT CHANGE UP
SODIUM SERPL-SCNC: 141 MMOL/L — SIGNIFICANT CHANGE UP (ref 135–146)
VARIANT LYMPHS # BLD: 1 % — SIGNIFICANT CHANGE UP (ref 0–5)
WBC # BLD: 3.3 K/UL — LOW (ref 4.8–10.8)
WBC # FLD AUTO: 3.3 K/UL — LOW (ref 4.8–10.8)

## 2020-04-14 RX ADMIN — HEPARIN SODIUM 5000 UNIT(S): 5000 INJECTION INTRAVENOUS; SUBCUTANEOUS at 05:55

## 2020-04-14 NOTE — DISCHARGE NOTE FOR THE EXPIRED PATIENT - HOSPITAL COURSE
History obtained from NH personnel at [facility] and pt's son Caleb dash. Pt at baseline is mostly non verbal, sometimes does speak a few words in Swedish and can for most part understand as per the NH staff. Pt has baseline dementia.   As per the NH staff, pt was sent to the hospital for abnormal labs including high creatinine and for evaluation of non productive cough for the past 3 days. NH staff denied any fever, shortness of breath, abdominal pain, nausea/vomiting. Pt's son said that pt has been having very poor PO intake and has been spitting out food and not eating at all. As per the son, pt has low kidney function but has been enough till now to avoid dialysis. The son said that they would like to avoid dialysis as much as possible until it is imminent.   ER Course: Vitals on admission were /107, HR 98, T 97.4F, SpO2 99% on 2L NC O2. Labs were significant for Cr of 8.3 with  and eGFR of 4. K was 7.5 which was a hemolyzed sample and was 4.2 on repeat VBG. EKG showed sinus tachycardia with QTC of 469.     Floor course: patient tested positive for COVID. Required oxygen support, maxed out on non-rebreather. Patient made DNR/DNI by son. Son was offered HD but declined after discussion with nephrology team that HD would not improve her prognosis. Patient's oxygenation continued to decline. Patient found pulseless by RN. Time of death: 6:47am.

## 2020-08-03 RX ORDER — METOPROLOL TARTRATE 50 MG
1 TABLET ORAL
Qty: 0 | Refills: 0 | DISCHARGE

## 2020-08-03 RX ORDER — ATORVASTATIN CALCIUM 80 MG/1
1 TABLET, FILM COATED ORAL
Qty: 0 | Refills: 0 | DISCHARGE

## 2020-08-03 RX ORDER — FERROUS SULFATE 325(65) MG
1 TABLET ORAL
Qty: 0 | Refills: 0 | DISCHARGE

## 2020-08-03 RX ORDER — CHOLECALCIFEROL (VITAMIN D3) 125 MCG
1 CAPSULE ORAL
Qty: 0 | Refills: 0 | DISCHARGE

## 2020-08-03 RX ORDER — FUROSEMIDE 40 MG
1 TABLET ORAL
Qty: 0 | Refills: 0 | DISCHARGE

## 2020-08-03 RX ORDER — ACETAMINOPHEN 500 MG
2 TABLET ORAL
Qty: 0 | Refills: 0 | DISCHARGE

## 2020-08-03 RX ORDER — CLOPIDOGREL BISULFATE 75 MG/1
1 TABLET, FILM COATED ORAL
Qty: 0 | Refills: 0 | DISCHARGE

## 2020-08-03 RX ORDER — HYDRALAZINE HCL 50 MG
3 TABLET ORAL
Qty: 0 | Refills: 0 | DISCHARGE

## 2020-08-03 RX ORDER — ASPIRIN/CALCIUM CARB/MAGNESIUM 324 MG
1 TABLET ORAL
Qty: 0 | Refills: 0 | DISCHARGE

## 2020-08-03 RX ORDER — NIFEDIPINE 30 MG
1 TABLET, EXTENDED RELEASE 24 HR ORAL
Qty: 0 | Refills: 0 | DISCHARGE

## 2020-09-25 NOTE — H&P ADULT - NSHPSOURCEINFORD_GEN_ALL_CORE
"                Scheduled Meds:   apixaban  2.5 mg Oral BID    chlorhexidine  15 mL Mouth/Throat BID    citalopram  20 mg Oral Daily    collagenase   Topical (Top) Daily    levothyroxine  50 mcg Oral Before breakfast    miconazole NITRATE 2 %   Topical (Top) BID    mupirocin   Nasal BID    norepinephrine        rosuvastatin  10 mg Oral Daily    sevelamer carbonate  1,600 mg Oral TID WM    sodium zirconium cyclosilicate  5 g Oral Daily    tiotropium  18 mcg Inhalation Daily     Continuous Infusions:   amiodarone in dextrose 5% 0.498 mg/min (09/25/20 0701)    phenylephrine 0.005 mcg/kg/min (09/25/20 0701)     PRN Meds:acetaminophen, dextrose 50%, dextrose 50%, glucagon (human recombinant), glucose, glucose, insulin aspart U-100, ondansetron    Review of patient's allergies indicates:  No Known Allergies     Past Medical History:   Diagnosis Date    Anemia     Anemia in stage 3 chronic kidney disease 11/26/2017    Anticoagulant long-term use     CHF (congestive heart failure)     COPD (chronic obstructive pulmonary disease)     COPD exacerbation 3/10/2020    Coronary artery disease     Diabetes mellitus     Digestive disorder     Encounter for blood transfusion     Essential hypertension 6/24/2017    Hypertension     Low blood pressure     MI (myocardial infarction) 2010    Segmental and subsegmental pulmonary emboli of RLL without acute cor pulmonale 11/25/2017    Stroke     July 2005    Thyroid disease     Traumatic subarachnoid hemorrhage 11/24/2017    Type 2 diabetes mellitus with stage 3 chronic kidney disease, without long-term current use of insulin 6/24/2017     Past Surgical History:   Procedure Laterality Date    ABCESS DRAINAGE Right     Between "little toe" and the one next to it    BREAST SURGERY      Reduction [id number]    CARDIAC SURGERY  01/2011    CABG 4vessel ring in one valve mitral valve prolapse    CORONARY ARTERY BYPASS GRAFT      DEBRIDEMENT OF FOOT Left " Patient/Home Health Aide or Other Non-Family Caregiver/Chart(s) "9/4/2020    Procedure: DEBRIDEMENT, FOOT;  Surgeon: Dennis Wick DPM;  Location: [facility];  Service: Podiatry;  Laterality: Left;    FOOT SURGERY      4 grafts to left foot    hyperlipidemia      TUBAL LIGATION  03/1986       Family History     Problem Relation (Age of Onset)    Arthritis Mother    Cancer Father (68)    Depression Sister    Diabetes Father, Maternal Grandmother    Heart disease Father    Hypertension Father, Son    Kidney disease Paternal Grandfather    Stroke Paternal Grandfather        Tobacco Use    Smoking status: Never Smoker    Smokeless tobacco: Never Used   Substance and Sexual Activity    Alcohol use: Yes     Alcohol/week: 1.0 - 2.0 standard drinks     Types: 1 - 2 Glasses of wine per week     Comment: "socially" not in awhile    Drug use: No    Sexual activity: Yes     Partners: Male     Birth control/protection: None     Review of Systems  Objective:     Vital Signs (Most Recent):  Temp: 97 °F (36.1 °C) (09/25/20 1145)  Pulse: 86 (09/25/20 1401)  Resp: (!) 31 (09/25/20 1401)  BP: (!) 95/54 (09/25/20 1401)  SpO2: (!) 88 % (09/25/20 1401) Vital Signs (24h Range):  Temp:  [97 °F (36.1 °C)-98.5 °F (36.9 °C)] 97 °F (36.1 °C)  Pulse:  [] 86  Resp:  [14-31] 31  SpO2:  [88 %-100 %] 88 %  BP: ()/(46-64) 95/54     Weight: 80.7 kg (178 lb)  Body mass index is 28.73 kg/m².    Foot Exam    Laboratory:  All pertinent labs reviewed within the last 24 hours.    Diagnostic Results:  I have reviewed all pertinent imaging results/findings within the past 24 hours.      "

## 2021-03-07 NOTE — DISCHARGE NOTE ADULT - NS DC ANGIO PCI YN
Abnormal ionized calcium elevated needs to discuss with endocrinologist.  Urine culture shows no infection. Make appointment with endocrinologist as soon as possible.   Call patient and make sure she is making appointment with endocrinologist no

## 2022-04-14 NOTE — PATIENT PROFILE ADULT. - FUNCTIONAL SCREEN CURRENT LEVEL: EATING, MLM
(0) independent/unknown patient confused Apixaban/Eliquis - Compliance/Apixaban/Eliquis - Dietary Advice/Apixaban/Eliquis - Follow up monitoring/Apixaban/Eliquis - Potential for adverse drug reactions and interactions
